# Patient Record
Sex: MALE | Race: WHITE | NOT HISPANIC OR LATINO | Employment: OTHER | URBAN - METROPOLITAN AREA
[De-identification: names, ages, dates, MRNs, and addresses within clinical notes are randomized per-mention and may not be internally consistent; named-entity substitution may affect disease eponyms.]

---

## 2017-01-20 ENCOUNTER — THERAPY VISIT (OUTPATIENT)
Dept: PHYSICAL THERAPY | Facility: CLINIC | Age: 57
End: 2017-01-20
Payer: MEDICARE

## 2017-01-20 DIAGNOSIS — G89.29 CHRONIC MIDLINE LOW BACK PAIN WITHOUT SCIATICA: Primary | ICD-10-CM

## 2017-01-20 DIAGNOSIS — M54.50 CHRONIC MIDLINE LOW BACK PAIN WITHOUT SCIATICA: Primary | ICD-10-CM

## 2017-01-20 PROCEDURE — 97110 THERAPEUTIC EXERCISES: CPT | Mod: GP | Performed by: PHYSICAL THERAPIST

## 2017-01-20 PROCEDURE — 97140 MANUAL THERAPY 1/> REGIONS: CPT | Mod: GP | Performed by: PHYSICAL THERAPIST

## 2017-01-27 ENCOUNTER — THERAPY VISIT (OUTPATIENT)
Dept: PHYSICAL THERAPY | Facility: CLINIC | Age: 57
End: 2017-01-27
Payer: MEDICARE

## 2017-01-27 DIAGNOSIS — M54.50 CHRONIC MIDLINE LOW BACK PAIN WITHOUT SCIATICA: Primary | ICD-10-CM

## 2017-01-27 DIAGNOSIS — G89.29 CHRONIC MIDLINE LOW BACK PAIN WITHOUT SCIATICA: Primary | ICD-10-CM

## 2017-01-27 PROCEDURE — 97035 APP MDLTY 1+ULTRASOUND EA 15: CPT | Mod: GP | Performed by: PHYSICAL THERAPIST

## 2017-01-27 PROCEDURE — 97110 THERAPEUTIC EXERCISES: CPT | Mod: GP | Performed by: PHYSICAL THERAPIST

## 2017-02-02 ENCOUNTER — THERAPY VISIT (OUTPATIENT)
Dept: PHYSICAL THERAPY | Facility: CLINIC | Age: 57
End: 2017-02-02
Payer: MEDICARE

## 2017-02-02 DIAGNOSIS — M54.50 CHRONIC MIDLINE LOW BACK PAIN WITHOUT SCIATICA: Primary | ICD-10-CM

## 2017-02-02 DIAGNOSIS — G89.29 CHRONIC MIDLINE LOW BACK PAIN WITHOUT SCIATICA: Primary | ICD-10-CM

## 2017-02-02 PROCEDURE — 97140 MANUAL THERAPY 1/> REGIONS: CPT | Mod: GP | Performed by: PHYSICAL THERAPIST

## 2017-02-02 PROCEDURE — 97035 APP MDLTY 1+ULTRASOUND EA 15: CPT | Mod: GP | Performed by: PHYSICAL THERAPIST

## 2017-02-10 ENCOUNTER — THERAPY VISIT (OUTPATIENT)
Dept: PHYSICAL THERAPY | Facility: CLINIC | Age: 57
End: 2017-02-10
Payer: MEDICARE

## 2017-02-10 DIAGNOSIS — G89.29 CHRONIC MIDLINE LOW BACK PAIN WITHOUT SCIATICA: Primary | ICD-10-CM

## 2017-02-10 DIAGNOSIS — M54.50 CHRONIC MIDLINE LOW BACK PAIN WITHOUT SCIATICA: Primary | ICD-10-CM

## 2017-02-10 PROCEDURE — 97035 APP MDLTY 1+ULTRASOUND EA 15: CPT | Mod: GP | Performed by: PHYSICAL THERAPIST

## 2017-02-10 PROCEDURE — 97110 THERAPEUTIC EXERCISES: CPT | Mod: GP | Performed by: PHYSICAL THERAPIST

## 2017-02-17 ENCOUNTER — THERAPY VISIT (OUTPATIENT)
Dept: PHYSICAL THERAPY | Facility: CLINIC | Age: 57
End: 2017-02-17
Payer: MEDICARE

## 2017-02-17 DIAGNOSIS — G89.29 CHRONIC MIDLINE LOW BACK PAIN WITHOUT SCIATICA: ICD-10-CM

## 2017-02-17 DIAGNOSIS — M54.50 CHRONIC MIDLINE LOW BACK PAIN WITHOUT SCIATICA: ICD-10-CM

## 2017-02-17 PROCEDURE — 97110 THERAPEUTIC EXERCISES: CPT | Mod: GP | Performed by: PHYSICAL THERAPIST

## 2017-02-17 PROCEDURE — 97035 APP MDLTY 1+ULTRASOUND EA 15: CPT | Mod: GP | Performed by: PHYSICAL THERAPIST

## 2017-03-03 ENCOUNTER — THERAPY VISIT (OUTPATIENT)
Dept: PHYSICAL THERAPY | Facility: CLINIC | Age: 57
End: 2017-03-03
Payer: MEDICARE

## 2017-03-03 DIAGNOSIS — M54.50 CHRONIC MIDLINE LOW BACK PAIN WITHOUT SCIATICA: ICD-10-CM

## 2017-03-03 DIAGNOSIS — G89.29 CHRONIC MIDLINE LOW BACK PAIN WITHOUT SCIATICA: ICD-10-CM

## 2017-03-03 PROCEDURE — 97110 THERAPEUTIC EXERCISES: CPT | Mod: GP | Performed by: PHYSICAL THERAPIST

## 2017-03-03 PROCEDURE — 97035 APP MDLTY 1+ULTRASOUND EA 15: CPT | Mod: GP | Performed by: PHYSICAL THERAPIST

## 2017-03-08 ENCOUNTER — THERAPY VISIT (OUTPATIENT)
Dept: PHYSICAL THERAPY | Facility: CLINIC | Age: 57
End: 2017-03-08
Payer: MEDICARE

## 2017-03-08 DIAGNOSIS — G89.29 CHRONIC MIDLINE LOW BACK PAIN WITHOUT SCIATICA: ICD-10-CM

## 2017-03-08 DIAGNOSIS — M54.50 CHRONIC MIDLINE LOW BACK PAIN WITHOUT SCIATICA: ICD-10-CM

## 2017-03-08 PROCEDURE — 97110 THERAPEUTIC EXERCISES: CPT | Mod: GP | Performed by: PHYSICAL THERAPIST

## 2017-03-17 ENCOUNTER — THERAPY VISIT (OUTPATIENT)
Dept: PHYSICAL THERAPY | Facility: CLINIC | Age: 57
End: 2017-03-17
Payer: MEDICARE

## 2017-03-17 DIAGNOSIS — M54.50 CHRONIC MIDLINE LOW BACK PAIN WITHOUT SCIATICA: ICD-10-CM

## 2017-03-17 DIAGNOSIS — G89.29 CHRONIC MIDLINE LOW BACK PAIN WITHOUT SCIATICA: ICD-10-CM

## 2017-03-17 PROCEDURE — 97110 THERAPEUTIC EXERCISES: CPT | Mod: GP | Performed by: PHYSICAL THERAPIST

## 2017-03-17 PROCEDURE — 97035 APP MDLTY 1+ULTRASOUND EA 15: CPT | Mod: GP | Performed by: PHYSICAL THERAPIST

## 2017-03-23 ENCOUNTER — THERAPY VISIT (OUTPATIENT)
Dept: PHYSICAL THERAPY | Facility: CLINIC | Age: 57
End: 2017-03-23
Payer: MEDICARE

## 2017-03-23 DIAGNOSIS — G89.29 CHRONIC MIDLINE LOW BACK PAIN WITHOUT SCIATICA: ICD-10-CM

## 2017-03-23 DIAGNOSIS — M54.50 CHRONIC MIDLINE LOW BACK PAIN WITHOUT SCIATICA: ICD-10-CM

## 2017-03-23 PROCEDURE — 97035 APP MDLTY 1+ULTRASOUND EA 15: CPT | Mod: GP | Performed by: PHYSICAL THERAPIST

## 2017-03-23 PROCEDURE — 97110 THERAPEUTIC EXERCISES: CPT | Mod: GP | Performed by: PHYSICAL THERAPIST

## 2017-03-31 ENCOUNTER — THERAPY VISIT (OUTPATIENT)
Dept: PHYSICAL THERAPY | Facility: CLINIC | Age: 57
End: 2017-03-31
Payer: MEDICARE

## 2017-03-31 DIAGNOSIS — G89.29 CHRONIC MIDLINE LOW BACK PAIN WITHOUT SCIATICA: ICD-10-CM

## 2017-03-31 DIAGNOSIS — M54.50 CHRONIC MIDLINE LOW BACK PAIN WITHOUT SCIATICA: ICD-10-CM

## 2017-03-31 PROCEDURE — 97035 APP MDLTY 1+ULTRASOUND EA 15: CPT | Mod: GP | Performed by: PHYSICAL THERAPIST

## 2017-03-31 PROCEDURE — 97110 THERAPEUTIC EXERCISES: CPT | Mod: GP | Performed by: PHYSICAL THERAPIST

## 2017-04-07 ENCOUNTER — THERAPY VISIT (OUTPATIENT)
Dept: PHYSICAL THERAPY | Facility: CLINIC | Age: 57
End: 2017-04-07
Payer: MEDICARE

## 2017-04-07 DIAGNOSIS — G89.29 CHRONIC MIDLINE LOW BACK PAIN WITHOUT SCIATICA: ICD-10-CM

## 2017-04-07 DIAGNOSIS — M54.50 CHRONIC MIDLINE LOW BACK PAIN WITHOUT SCIATICA: ICD-10-CM

## 2017-04-07 PROCEDURE — 97035 APP MDLTY 1+ULTRASOUND EA 15: CPT | Mod: GP | Performed by: PHYSICAL THERAPIST

## 2017-04-07 PROCEDURE — 97110 THERAPEUTIC EXERCISES: CPT | Mod: GP | Performed by: PHYSICAL THERAPIST

## 2017-04-13 DIAGNOSIS — R25.2 SPASM: ICD-10-CM

## 2017-04-13 RX ORDER — DIAZEPAM 5 MG
TABLET ORAL
Qty: 60 TABLET | Refills: 0 | Status: SHIPPED | OUTPATIENT
Start: 2017-04-13 | End: 2018-04-09

## 2017-04-13 NOTE — TELEPHONE ENCOUNTER
Valium      Last Written Prescription Date: 11/29/2016  Last Fill Quantity: 60,  # refills: 0   Last Office Visit with [facility], [facility] or [facility] prescribing provider: 11/29/2016

## 2017-04-21 ENCOUNTER — THERAPY VISIT (OUTPATIENT)
Dept: PHYSICAL THERAPY | Facility: CLINIC | Age: 57
End: 2017-04-21
Payer: MEDICARE

## 2017-04-21 DIAGNOSIS — G89.29 CHRONIC MIDLINE LOW BACK PAIN WITHOUT SCIATICA: ICD-10-CM

## 2017-04-21 DIAGNOSIS — M54.50 CHRONIC MIDLINE LOW BACK PAIN WITHOUT SCIATICA: ICD-10-CM

## 2017-04-21 PROCEDURE — 97110 THERAPEUTIC EXERCISES: CPT | Mod: GP | Performed by: PHYSICAL THERAPIST

## 2017-04-21 PROCEDURE — 97035 APP MDLTY 1+ULTRASOUND EA 15: CPT | Mod: GP | Performed by: PHYSICAL THERAPIST

## 2017-04-28 ENCOUNTER — THERAPY VISIT (OUTPATIENT)
Dept: PHYSICAL THERAPY | Facility: CLINIC | Age: 57
End: 2017-04-28
Payer: MEDICARE

## 2017-04-28 DIAGNOSIS — G89.29 CHRONIC MIDLINE LOW BACK PAIN WITHOUT SCIATICA: ICD-10-CM

## 2017-04-28 DIAGNOSIS — M54.50 CHRONIC MIDLINE LOW BACK PAIN WITHOUT SCIATICA: ICD-10-CM

## 2017-04-28 PROCEDURE — 97110 THERAPEUTIC EXERCISES: CPT | Mod: GP | Performed by: PHYSICAL THERAPIST

## 2017-06-27 NOTE — PROGRESS NOTES
Progress Note    Progress reporting period is from initial eval to Apr 28, 2017.     Patient seen for   visits.    SUBJECTIVE  Subjective changes noted by patient:  Subjective: Had another good week. No leg symptoms. Going to try ~1 month independently to see how he does. .  Current pain level is Current Pain level: 1/10.     Previous pain level was  Initial Pain level: 4/10.   Changes in function:  Yes (See Goal flowsheet attached for changes in current functional level)  Adverse reaction to treatment or activity: None    OBJECTIVE  Changes noted in objective findings: Objective: R and L LBP equivalent today, and incr tone in bilateral paraspinals - though lesser. Demonstrates good muscular endurance with UE resisted circuit today. .    ASSESSMENT/PLAN      DIAGP:  The encounter diagnosis was Chronic midline low back pain without sciatica.  Updated problem list and treatment plan:   Diagnosis 1:  Mechanical LBP w/o sciatica    Pain -  self management  Decreased joint mobility - manual therapy, therapeutic exercise and home program  Impaired muscle performance - neuro re-education  Decreased function - therapeutic activities  Impaired posture - neuro re-education    STG/LTGs have been met or progress has been made towards goals:  Yes, please see goal flowsheet for most current information.    Assessment of Progress: The patient's condition has potential to improve.  The patient's progress has plateaued.  Self Management Plans:  Patient is independent in a home treatment program.  I have re-evaluated this patient and find that the nature, scope, duration and intensity of the therapy is appropriate for the medical condition of the patient.  Ambrocio continues to require the following intervention to meet STG and LTG's:  PT.    Recommendations:  This patient would benefit from continued therapy - will trial independently working on home exercise program for 1 month.       Jay Weiner, PT, DPT      Please refer to the  daily flowsheet for treatment today, total treatment time and time spent performing 1:1 timed codes.

## 2017-10-02 ENCOUNTER — TELEPHONE (OUTPATIENT)
Dept: FAMILY MEDICINE | Facility: CLINIC | Age: 57
End: 2017-10-02

## 2017-10-02 DIAGNOSIS — J01.01 ACUTE RECURRENT MAXILLARY SINUSITIS: Primary | ICD-10-CM

## 2017-10-02 RX ORDER — AZITHROMYCIN 250 MG/1
TABLET, FILM COATED ORAL
Qty: 6 TABLET | Refills: 0 | Status: SHIPPED | OUTPATIENT
Start: 2017-10-02 | End: 2018-04-09

## 2017-10-02 NOTE — TELEPHONE ENCOUNTER
Reason for call:  Patient reporting a symptom    Symptom or request: Feels like he is getting a sinus infection:  pain behind nose and eyes especially on left side.  About a degree of temperature.     Duration (how long have symptoms been present):  About a week    Additional comments: I offered NP appointment tomorrow.  Asking if possible to get something prescribed over the phone as he is disabled and has difficulty getting transportation.     Phone Number patient can be reached at:  Home number on file [phone] (home)    Best Time:      Can we leave a detailed message on this number:  YES    Call taken on 10/2/2017 at 2:48 PM by Luz Elena Hinton

## 2017-10-02 NOTE — TELEPHONE ENCOUNTER
Spoke with patient.  He is appreciative and will call if not improving after about 72 hours on the antibiotic.  Tiffanie Dan RN

## 2017-10-02 NOTE — TELEPHONE ENCOUNTER
PCP:    Pt reports he developed some cold like symptoms last week -- nasal congestion, thick mucus.    Some throat soreness, mild. Now the Pt is having sinus pressure and pain on the Left side of his face.   The Pt is running a temp of 99 currently. Symptoms have been present for about 8 days.     The Pt is disabled and has a difficult time getting into the clinic. Advised him typically we do not prescribe antibiotics over the phone, however the Pt is requesting we run this by PCP first.   Pharmacy is pended if needed.     Callie Sebastian RN

## 2017-10-05 ENCOUNTER — TELEPHONE (OUTPATIENT)
Dept: FAMILY MEDICINE | Facility: CLINIC | Age: 57
End: 2017-10-05

## 2017-10-05 NOTE — TELEPHONE ENCOUNTER
Left message inquiring what patient is requesting sent referral orders? Results? confirm the current address in chart please.   Anju Sharpe CMA

## 2017-10-05 NOTE — TELEPHONE ENCOUNTER
Reason for Call:  Request for results:    Name of test or procedure: Echocardiogram, Bone Density test and other     Date of test of procedure: NA     Location of the test or procedure: [place]     OK to leave the result message on voice mail or with a family member? YES    Phone number Patient can be reached at:  Cell number on file:    Telephone Information:   Mobile [phone]       Additional comments: Ambrocio Mayberryy and wife are calling because the wrong address was in the system today. They are okay with everything being mailed. Would like a call when everything is mailed out.     Call taken on 10/5/2017 at 2:04 PM by Karishma Colin

## 2017-10-30 ENCOUNTER — HOSPITAL ENCOUNTER (OUTPATIENT)
Dept: OCCUPATIONAL THERAPY | Facility: CLINIC | Age: 57
Setting detail: THERAPIES SERIES
End: 2017-10-30
Attending: INTERNAL MEDICINE
Payer: MEDICARE

## 2017-10-30 PROCEDURE — 40000132 ZZH STATISTIC OT SEATING/WHEELED MOBILITY VISIT: Performed by: OCCUPATIONAL THERAPIST

## 2017-10-30 PROCEDURE — 97542 WHEELCHAIR MNGMENT TRAINING: CPT | Mod: GO | Performed by: OCCUPATIONAL THERAPIST

## 2017-10-30 NOTE — PROGRESS NOTES
[facility]                                                                                             OUTPATIENT OCCUPATIONAL THERAPY  EVALUATION  PLAN OF TREATMENT FOR OUTPATIENT REHABILITATION  (COMPLETE FOR INITIAL CLAIMS ONLY)    Patient's Last Name, First Name, M.I.                    YOB: 1960  Ambrocio Delvalle       Provider s Name: [facility] Medical Record No.  [id number]     Onset Date: 9/17/17 (order)   Start of Care Date: 10/30/17     Type: OCCUPATIONAL THERAPY   Medical Diagnosis:     Therapy Diagnosis: spastic limited coordination limit safe and independent participation in MRADLS       _______________________________________________________________________  Plan of Treatment/Functional Goals:  Planned Therapy Interventions: Wheelchair Management/Propulsion Training  Educated on types of RWD chairs and desire to change back to what he previously had.  Patient to have home trial with RWD, need to have contoured backrest     Goals  Goal Identifier: Power mobility  Goal Description: Patient to demonstrate a successful home trial with the recommended equipment  Target Date: 01/28/18     Therapy Frequency: once    Ana James, OTR/L,ATP  _______________________________________________________________________    I CERTIFY THE NEED FOR THESE SERVICES FURNISHED UNDER        THIS PLAN OF TREATMENT AND WHILE UNDER MY CARE     (Physician co-signature of this document indicates review and certification of the therapy plan).              Certification Period: 10/30/17 to 01/28/18     Referring Physician: Ezekiel Quevedo    Initial Assessment        See evaluation for start of care date 10/30/17 in Epic electronic health record.

## 2017-10-30 NOTE — PROGRESS NOTES
SEATING AND WHEELED MOBILITY ASSESSMENT  10/30/17 1400   Quick Adds   Quick Adds Certification;Current Power Wheelchair   General Information:   Rehab Discipline OT   Funding Medicare/BCBS   Service Outpatient;Occupational Therapy;Seating/Wheeled Mobility Evaluation   Height 5'6   Weight 190   Start Of Care Date 10/30/17   Referring Physician Ezekiel Quevedo   Orders Evaluate And Treat As Indicated;Per Therapist Evaluation   Orders Date 09/17/17   Others Present at Evaluation Mother/caregiver   Patient/Caregiver Goals Replacement power chair   Rehabilitation Technology Supplier Ana FLOR from [facility]   Current Community Support Family/Friend Caregiver  (mother- Ivette)   Patient role/Employment history Disabled;Other/Comments  (volunteers at [employer])   General Information Comments teaches [employer]   Medical History   Onset Of Illness/injury Or Date Of Surgery 9/17/17  (order)   Medical Diagnosis CP   Medical History Complex regional pain syndrome, pulmonary HTN, depression, heel cord lengthehing, abbductor release, pattela lowering   Current Power Wheelchair   Age 7 years old    Inv[employer]are TDX SP   Cushion Custom  (slimline- honeycomb)   Back Solid Curved   Footrest Style power elevating and swing away   Headrest Yes   Settings Used Home;Outdoor Community Mobility;Primary Means of Transportation   Condition Beyond Further Justifiable Repair   Positioning Features Tilt Seat;Recline Back;Power Elevating Leg Rests   Power Control Right Joystick   Current Equipment Requires Replacement   Rational for Equipment Changes Heavy duty user needing replacement power chair with updated seating and return to RWD that he is more comfortable with   Home Accessibility   Living Environment House   Primary Entrance Covered Ramp   All Rooms Wheelchair Accessible Yes   Community ADL   Transportation Adapted Vehicle;Transportation Services   Adapted Vehicle Features Side Entry   Community Mobility  Requirements Medical Appointments;Baptism;Shopping   Cognitive/Visual/Hearing Status   Observations No Problems Observed During Evaluation   Vision Corrective Lenses   Hearing Intact   ADL Status   Feeding Requires Assist  (assist with set up and cutting)   Grooming/Hygiene Independent   Dressing Requires Assist   Toileting Uses Equipment;Requires Assist  (shower commode slide system)   Bathing Requires Assist;Uses Equipment  (shower commode chair slide sytem)   Meal Preparation Unable   Home Management Unable   ADL Comments Shower commode slide system is 20 years old and no longer made thus cannot be fixed.  Rigor system is removed when mother showers.  Daily showers and toilet use   Fatigue   Fatigue Measure Score one nap per day   Balance   Unsupported Sitting Balance Uses Upper Extremities for Balance   Sitting Balance in Chair Uses Upper Extremities for Balance   Standing Balance Unable   Ambulation   Ambulation Non Ambulatory   Transfers   Transfer Assist Dependent   Transfer Method Mechanical Lift   Transfer Comments Sure hands lift- beyond justifiable repair.  11 years old    Sleep/Rest   Sleep Surface/Equipment [facility] bed   Wheelchair Ability   Wheelchair Ability Quick Adds Power Chair;Wheelchair Use   Power Wheelchair Propulsion   Operate Power Wheelchair Standard Joystick;Independent   Wheelchair Use   Ability to Perform Weight Shifts Assist   Bed Confined Without Wheelchair? Yes   Hours in Wheelchair Daily 12   Hours Spent Alone Daily 0   Fall Risk Screen   Fall screen completed by OT   Have you fallen 2 or more times in the last year? No   Have you fallen and had an injury in the past year? No   Is the patient a fall risk? No   Neuromuscular   History of Pressure Sores No   Current Pressure Sores No   Pain Yes   Pain Location chronic back pain- lumbar   Coordination UE Impaired;LE Impaired   Tone Spasticity  (baclofen-oral)   Sensory Deficits Reported feet occationally   Head and Neck    Head and Neck Position Functional   Head Control Good   Upper Extremities   UE ROM Full range with limited internal rotation, L elbow contracture   UE Strength 4+/5   Dominance Right   Pelvis   Anterior/Posterior Pelvis Position Anterior Tilt;Partially Flexible   Pelvic Obliquity Left Elevation   Trunk   Anterior/Posterior Trunk Position Increased Lumbar Lordosis;Partially Flexible   Left-Right Trunk Position S Curve   Lower Extremities   Hip Position Adducted   LE ROM limited active range, passive range limited at all joints- tight heel cords and hamstrings ( prior surgeries to lengthen)   LE Strength 0-1/5   Foot Positioning Plantar flexed   LE Comments custom shoes- right smaller than left   Patient Measurements   Other see atp notes   Education Assessment   Barriers to Learning No Barriers   Preferred Learning Style Listening;Demonstration   Assessment/Plan   Criteria for Skilled Interventions Met Yes, Treatment Indicated   Treatment Diagnosis spastic limited coordination limit safe and independent participation in MRADLS    Therapy Frequency once   Planned Therapy Interventions Wheelchair Management/Propulsion Training   Planned Therapy Interventions Comments Educated on types of RWD chairs and desire to change back to what he previously had.  Patient to have home trial with RWD, need to have contoured backrest   Risks and benefits of treatment have been explained Yes   Patient/family & other staff in agreement with plan of care Yes   Comments home trials   Session Time   Total Treatment Time 90   Total Session Time 90   Certification   Certification date from 10/30/17   Certification date to 01/28/18   Adult OT Eval Goals   OT Eval Goals (Adult) 1   OT Goal 1   Goal Identifier Power mobility   Goal Description Patient to demonstrate a successful home trial with the recommended equipment   Target Date 01/28/18   Electronically signed by:  Ana James OTR/L, ATP       Occupational Therapist, Assistive    [phone]      fax: [phone]      [email or web address], [address]  [place]   [address]

## 2017-11-29 DIAGNOSIS — F33.41 RECURRENT MAJOR DEPRESSIVE DISORDER, IN PARTIAL REMISSION (H): ICD-10-CM

## 2017-11-29 NOTE — LETTER
[facility]  [address] [facility] [address]  Phone: [phone]        December 6, 2017      Ambrocio Delvalle                                     [address] [facility] [address]            Dear Tony Delvalle,    We are concerned about your health care.  We recently provided you with a medication refill.  Many medications require routine follow-up with your Doctor.      At this time we ask that: You schedule an appointment for your annual physical.    Your prescription: Has been refilled for 1 month so you may have time for the above noted follow-up.      Thank you,      Ezekiel Quevedo MD/ mike

## 2017-12-05 ENCOUNTER — ALLIED HEALTH/NURSE VISIT (OUTPATIENT)
Dept: NURSING | Facility: CLINIC | Age: 57
End: 2017-12-05
Payer: COMMERCIAL

## 2017-12-05 DIAGNOSIS — Z23 NEED FOR PROPHYLACTIC VACCINATION AND INOCULATION AGAINST INFLUENZA: Primary | ICD-10-CM

## 2017-12-05 PROCEDURE — 90662 IIV NO PRSV INCREASED AG IM: CPT

## 2017-12-05 PROCEDURE — G0008 ADMIN INFLUENZA VIRUS VAC: HCPCS

## 2017-12-05 NOTE — PROGRESS NOTES

## 2017-12-05 NOTE — MR AVS SNAPSHOT
"              After Visit Summary   2017    Ambrocio Delvalle    MRN: [id number]           Patient Information     Date Of Birth          1960        Visit Information        Provider Department      2017 11:30 AM [facility] [place] NURSE [facility]        Today's Diagnoses     Need for prophylactic vaccination and inoculation against influenza    -  1       Follow-ups after your visit        Who to contact     If you have questions or need follow up information about today's clinic visit or your schedule please contact [facility] directly at [phone].  Normal or non-critical lab and imaging results will be communicated to you by Vdancerhart, letter or phone within 4 business days after the clinic has received the results. If you do not hear from us within 7 days, please contact the clinic through Jukelyt or phone. If you have a critical or abnormal lab result, we will notify you by phone as soon as possible.  Submit refill requests through Reasult or call your pharmacy and they will forward the refill request to us. Please allow 3 business days for your refill to be completed.          Additional Information About Your Visit        MyChart Information     Reasult lets you send messages to your doctor, view your test results, renew your prescriptions, schedule appointments and more. To sign up, go to [email or web address] . Click on \"Log in\" on the left side of the screen, which will take you to the Welcome page. Then click on \"Sign up Now\" on the right side of the page.     You will be asked to enter the access code listed below, as well as some personal information. Please follow the directions to create your username and password.     Your access code is: [email or web address]  Expires: 3/5/2018 12:13 PM     Your access code will  in 90 days. If you need help or a new code, please call your [facility] or [phone].        Care EveryWhere ID     This is your Care EveryWhere ID. " This could be used by other organizations to access your [place] medical records  [id number]         Blood Pressure from Last 3 Encounters:   11/29/16 139/85   09/18/15 134/86   08/21/14 (!) 148/93    Weight from Last 3 Encounters:   11/29/16 186 lb (84.4 kg)   09/18/15 186 lb (84.4 kg)   08/21/14 175 lb (79.4 kg)              We Performed the Following     FLU VACCINE, INCREASED ANTIGEN, PRESV FREE     Vaccine Administration, Initial [[id number]]        Primary Care Provider Office Phone # Fax #    Ezekiel Singh Quevedo [phone] [address] [address]        Equal Access to Services     [facility] CAROLYNN : Hadii aad ku hadasho Soomaali, waaxda luqadaha, qaybta kaalmada adeegyada, waxay dionne dozier . So [facility] [phone].    ATENCIÓN: Si habla español, tiene a narvaez disposición servicios gratuitos de asistencia lingüística. Llame al [phone].    We comply with applicable federal civil rights laws and [place] laws. We do not discriminate on the basis of race, color, national origin, age, disability, sex, sexual orientation, or gender identity.            Thank you!     Thank you for choosing [facility]  for your care. Our goal is always to provide you with excellent care. Hearing back from our patients is one way we can continue to improve our services. Please take a few minutes to complete the written survey that you may receive in the mail after your visit with us. Thank you!             Your Updated Medication List - Protect others around you: Learn how to safely use, store and throw away your medicines at www.disposemymeds.org.          This list is accurate as of: 12/5/17 12:13 PM.  Always use your most recent med list.                   Brand Name Dispense Instructions for use Diagnosis    azithromycin 250 MG tablet    ZITHROMAX    6 tablet    Two tablets first day, then one tablet daily for four days.    Acute recurrent maxillary  sinusitis       baclofen 20 MG tablet    LIORESAL    450 tablet    Take 1 tablet (20 mg) by mouth 5 times daily    Spasm       diazepam 5 MG tablet    VALIUM    60 tablet    TAKE ONE TABLET (5MG) BY MOUTH ONCE NIGHTLY AS NEEDED    Spasm       FLONASE 50 MCG/ACT spray   Generic drug:  fluticasone     16 g    Spray 1-2 sprays into both nostrils daily    Nasal congestion       ibuprofen 800 MG tablet    ADVIL/MOTRIN    90 tablet    Take 1 tablet by mouth every 8 hours as needed.    HTN (hypertension)       LIPITOR 20 MG tablet   Generic drug:  atorvastatin     90 tablet    Take 1 tablet (20 mg) by mouth daily    Hyperlipidemia LDL goal <130       * order for DME     1 Units    Equipment being ordered: Hospital Bed    Cerebral palsy (H), Reflex sympathetic dystrophy of lower extremity, bilateral       * order for DME     1 Units    Equipment being ordered: Wheelchair    Cerebral palsy (H), Reflex sympathetic dystrophy of lower extremity, bilateral       potassium chloride 8 MEQ CR tablet    KLOR-CON    180 tablet    Take 2 tablets (16 mEq) by mouth daily    Muscle spasm, Benign essential hypertension       sertraline 100 MG tablet    ZOLOFT    180 tablet    Take 2 tablets (200 mg) by mouth daily    Recurrent major depressive disorder, in partial remission (H)       triamterene-hydrochlorothiazide 37.5-25 MG per capsule    DYAZIDE    90 capsule    Take 1 capsule by mouth every morning    Benign essential hypertension       TYLENOL SINUS NIGHT TIME PO      Take  by mouth.        VITAMIN D PO      Take  by mouth.        * Notice:  This list has 2 medication(s) that are the same as other medications prescribed for you. Read the directions carefully, and ask your doctor or other care provider to review them with you.

## 2017-12-06 RX ORDER — SERTRALINE HYDROCHLORIDE 100 MG/1
TABLET, FILM COATED ORAL
Qty: 180 TABLET | Refills: 0 | Status: SHIPPED | OUTPATIENT
Start: 2017-12-06 | End: 2018-03-28

## 2017-12-06 NOTE — TELEPHONE ENCOUNTER
Medication is being filled for 1 time refill only due to:  Patient needs to be seen because it has been more than one year since last visit.   Letter mailed to patient today.  Dionne Deshpande RN

## 2017-12-18 DIAGNOSIS — E78.5 HYPERLIPIDEMIA LDL GOAL <130: ICD-10-CM

## 2017-12-20 RX ORDER — ATORVASTATIN CALCIUM 20 MG
TABLET ORAL
Qty: 90 TABLET | Refills: 0 | Status: SHIPPED | OUTPATIENT
Start: 2017-12-20 | End: 2018-03-22

## 2017-12-20 NOTE — TELEPHONE ENCOUNTER
Routing refill request to provider for review/approval because:  Laly given x1 and patient did not follow up, please advise    Letter was mailed to pt in November, pt due for px.  Last seen  11/29/16  Blanquita Rose RN

## 2018-01-31 NOTE — ADDENDUM NOTE
Encounter addended by: Ana Nichole OT on: 1/31/2018  3:30 PM<BR>     Actions taken: Sign clinical note

## 2018-01-31 NOTE — PROGRESS NOTES
REQUISITION AND JUSTIFICATION FOR DURABLE MEDICAL EQUIPMENT    Patient Name:  Ambrocio Delvalle  MR #:  [id number]  :  1960  Age/Gender:  57 year old male  Visit Date:  Ambrocio Delvalle seen for seating and wheeled mobility evaluation by Ana Nichole OTR/L,ATP and ATP from [facility] on 10/30/17.    CLINICAL CRITERIA FOR MOBILITY ASSISTIVE EQUIPMENT  Coverage Criteria Per Local Coverage Determination  A) Ambrocio has mobility limitations due to Cerebral Palsy, complex regional pain syndrome, pulmonary HTN, depression, heel cord lengthening, abductor release, and patella lowering that significantly impairs his ability to participate in all of his mobility-related activities of daily living (MRADL). Specifically affected are toileting (being able to get there in time to prevent accidents), dressing, and bathing (getting into the bathroom of designated place). Current equipment used is 7 year old TDX SP. This patient needs the new equipment requested to be able to continue to be able to be independent with all MRADLs.  He is a heavy duty user that requires updating of current power seat functions and return to rear wheel drive mobility in which he had prior to this chair which is easier to handle in his environment. Please see additional documentation in the seating and wheeled mobility report for details.   Ambrocio had a successful clinical trial here, and also a successful trial at home with the recommended equipment. Ambrocio is very willing and physically / cognitively able to use the recommended equipment to assist his with mobility-related activities of daily living and general mobility. A group 3 power wheelchair is being requested because it has better suspension for a smooth ride and has the capabilities of expandable electronics to operate the  power seat functions Ambrocio needs for independence with his activities of daily living. A Group 2 power wheelchair does not have sufficient electronics to  support this patient's progressive neurological deficits due to CP.  B) Ambrocio's mobility limitation cannot be sufficiently and safely resolved by the use of an appropriately fitted cane or walker because he is non ambulatory due to spastic CP. Strength of legs is 0-1/5 for one maximal repetition. Fatigue also impacts this patient's ability to ambulate, regardless of the gait aid.    C) Ambrocio does not have sufficient upper extremity function to self-propel an optimally-configured manual wheelchair in his home to perform MRADLs during a typical day due to limitations in strength, endurance, range of motion, and coordination. Distance and time to propel a light weight manual wheelchair NT as he is a full time power w/c user.  Strength of arms is 4+/5.  D)  Ambrocio is not able to use a POV/scooter because it will not fit in his home environment. Ambrocio is unable to safely transfer to and from a POV, unable to operate the tiller steering system, and unable to maintain postural stability and position while operating the POV. Ambrocio needs more appropriate seating and positioning than any scooter seat provides.  E) The need for this equipment is LIFETIME.     RECOMMENDATIONS FOR MOBILITY BASE, SEATING SYSTEM AND COMPONENTS  [employer] Torque 3- this rear wheel drive power wheelchair is needed for this patient to continue to have independent mobility and to be able to allow him to complete or assist in all of his mobility related activities of daily living (MRADLs). This wheelchair will also have the seating and positioning system and seat function he needs to be able to use and tolerate the wheelchair full time, and have functional and comfortable positioning for a full day's activities. Ambrocio has Cerebral Palsy, complex regional pain syndrome, pulmonary HTN, depression, heel cord lengthening, abductor release, and patella lowering which impairs him ability to move in his home without the use of the requested  wheelchair. He lives in an accessible home with ramp access and uses an adapted van or transport services for transportation.    Transit option- Ambrocio must frequently travel to medical and therapy appointments in the community and travel using paratransit/public transit/wheelchair adapted van.  Due to weakness, in-coordination, and safety concerns he is unable to independently initiate and safely complete a transfer from his/her wheelchair to the crash tested automobile, paratransit or public transportation vehicle seat.  The concept of a wheelchair transportation safety system allows for a securement system for anchoring the wheelchair to the floor of the motor vehicle, has an occupant restraint to hold the individual in their wheelchair and prevent secondary injury from hitting obstacles within the vehicle or being ejected from the vehicle and includes identified securement points to correctly secure the wheelchair to the floor, an occupant pelvic safety belt, and the strength and design features.    2 Batteries and  - gel sealed, and two are necessary to power the wheelchair. They are maintenance free and are safe for travel on the road or in the air. They are necessary to provide reliable use of the power wheelchair on a single charge.    90 amp controller -  Needed for operation of the joystick for mobility and seat functions    Harness for expandable controller - needs to be inline for communication between the controller and the joystick    MPJ + multiple drive Joystick - this is the joystick needed to be used by this patient for moving this wheelchair and also controlling the movement of the seat functions.    Attendant control- joystick style controller to allow Ambrocio's caregivers to steer/drive chair from behind. It is necessary for use when the he becomes fatigued, following medical episodes, and in potentially unsafe conditions like an busy, crowded areas, traffic intersections, steep ramps,  or in emergency situations. It can also be used to maneuver the chair into an appropriate position for transfers or vehicle transport.    Quad link swing away joystick mount - needed to be able to move the joystick out of the way during transfers, or when at the desk using the computer, or at the table eating, so as not to inadvertently hit the joystick, thus moving the wheelchair or turning the power on or off without his knowledge.    16.75 seat to floor height- needed to properly fit Ambrocio    UIltra low Noah CG tilt an power recline- This seating function combination is needed for this patient to be able to perform independent weight shifts and also to be able to change him seated position without the request of a care giver. He requires a powered seating system with both tilt and recline to optimize pressure distribution and postural repositioning.   The power tilt seat allows Ambrocio to change his position by rotating rearward without changing the angle of his hips or knees. Due to atrophy of his buttocks he is at high risk of developing pressure ulcers if not able to change his position. Due to compromised ability to exert himself for weight shifting, the power tilt seat allows him to do this by operating it through the joystick. He can also use a slight degree of tilt for assisting in his seating and positioning for normal functions during the day a to assist keeping him in a midline, erect and upright position by using the effect of gravity.   The power reclining back feature also reduces pressures by allowing Ambrocio to change the angle of his hips and knees into a more open and supine / recumbent position. This increases his tolerance and be able to remain in the requested wheelchair for a complete day and not be dependent on care givers to change his position or transfer him to another surface for comfort or resting. The recline feature can be used to access the perineal area necessary for toileting  assistance. The power tilt seat and power recline back are necessary features that allow tasks to be done by the care giver without the need for transferring back to bed for these activities.  The medical justification for these seat functions is consistent with the [employer] Position Papers regarding these seat function interventions (Cheikh et al., 2009). These seat functions will also reduce upper extremity strain per the Paraly[employer] Guidelines for upper limb preservation (Katlyn, et al., 2005).    Matrx Aguilar standard pad- needed to keep Ambrocio's head in an erect, midline and upright position whether he is in the upright, tilted or reclined position. His head and neck need to be supported as well as the rest of his body.    Aguilar headrest multi axis mounting hardware - needed for mounting the requested headrest in the most appropriate position on the back of the wheelchair for optimal head and neck support and to be able to be removed for transfers.     Matrx elite deep Solid curved and padded back support - firm and contoured back support is needed to support Ambrocio's thoracolumbar area in an upright and midline position, with appropriate support pads as needed. This will provide support whether he is in the upright or tilted position. This back support is essential to provide sufficient lateral contour to maximize his postural alignment and minimize his tendency to develop scoliosis and other secondary complications.    Modular arm pads- allows for proper arm support with chair use.    Noah style swing away power elevating foot legrest- Allows for elevation and extension of lower extremities while elevating. This can improve circulation and prevent/reduce edema (with recline/tilt combination).  The lower legs of this wheelchair user act as a reservoir for fluid accumulation due to lack of movement. Elevation of this patient's legs above the level of the heart (left atrium) is recommended as  part of the management of edema in conjunction with other measures such as support garments  This patient has lower extremity dependent edema while sitting in his wheelchair, which resolves with elevation of his legs. This feature, when used with the power recline back or tilt seat, can increase Ambrocio's sitting tolerance while positioning him in a more natural position. This can also be helpful if he fatigues and requires rests throughout the day, without transferring him back to bed.  Due to inability to perform a functional weight shifting, he is at risk for developing pressure ulcers. With the use of this feature it will allow for optimal weight shifting in conjunction with tilt and recline.Per [employer] white paper on elevating legrests, using elevating legrests and tilting more than 30 degrees in combination with full recline significantly improves lower leg hemodynamics status as measured by near-infrared spectroscopy (Cruz et al., 2010)  Additionally, these legrests can improve ground clearance to navigate thresholds and slopes and still allowing the legs to achieve a tight 90 degree position for typical driving conditions. This position shortens the overall functional wheelbase for improved maneuverability    Calf supports- angle and height adjustable pads that attach to the legrests. These padded calf supports are necessary to support Ambrocio lower legs when the leg is elevated, or to keep feet from falling behind the footplates when in the neutral seated position. Calf supports are especially important when using a tilt-in-space power seating system and/or power articulating elevating legrests. The padding provides an additional surface to distribute pressure, and has a mild contour to accommodate the lower leg.    Angle adjustable foot plates- allows for proper leg support while dealing with spastic LE in order to properly support LE.    Power Positioning electronics to be operated through the through  drive controller - this is needed to allow him to use the joystick of the wheelchair for control of mobility and operation of the power seat function. This is important for this patient with limited strength and coordination so as not to require a separate switch for operation of the seat function.    2 Batteries and  - gel sealed, and two are necessary to power the wheelchair. They are maintenance free and are safe for travel on the road or in the air. They are necessary to provide reliable use of the power wheelchair on a single charge.    Supra core contoured seat cushion - this pressure distribution and positioning seat cushion will optimally  distribute seating pressures to prevent pressure ulcers, but also provide a stable base of support for him to use during MRADLs.    This equipment is reasonable and necessary with reference to accepted standards of medical practice and treatment of this patient's condition and is not being recommended as a convenience item. Without this recommended equipment, he is highly likely to sustain injuries from falls, develop pressure sores or postural compensation, and/or be bed confined, which those costs far exceed the cost of the requested equipment.    Electronically signed by:  Ana HICKMAN, ATP      Occupational Therapist, Assistive   [phone]      fax: [phone]      [email or web address]  Seating [facility]- [facility], [place] [address]  January 31, 2018    I have read and concur with the above recommendations.    Physician Printed Name __________________________________________    Physician SIgnature  _____________________________________________    Date of SIgnature ______________________________    Physician Phone  ______________________________

## 2018-02-12 NOTE — PROGRESS NOTES
REQUISITION AND JUSTIFICATION FOR DURABLE MEDICAL EQUIPMENT    Patient Name:  Ambrocio Delvalle  MR #:  [id number]  :  1960  Age/Gender:  57 year old male  Visit Date:  Ambrocio Delvalle seen for seating and wheeled mobility evaluation by Ana Nichole OTR/L,ATP and ATP from [facility] on 10/30/17.    CLINICAL CRITERIA FOR MOBILITY ASSISTIVE EQUIPMENT  Coverage Criteria Per Local Coverage Determination  A) Ambrocio has mobility limitations due to Cerebral Palsy, complex regional pain syndrome, pulmonary HTN, depression, heel cord lengthening, abductor release, and patella lowering that significantly impairs his ability to participate in all of his mobility-related activities of daily living (MRADL). Specifically affected are toileting (being able to get there in time to prevent accidents), dressing, and bathing (getting into the bathroom of designated place). Current equipment used is 7 year old TDX SP. This patient needs the new equipment requested to be able to continue to be able to be independent with all MRADLs.  He is a heavy duty user that requires updating of current power seat functions and return to rear wheel drive mobility in which he had prior to this chair which is easier to handle in his environment. Please see additional documentation in the seating and wheeled mobility report for details.   Ambrocio had a successful clinical trial here, and also a successful trial at home with the recommended equipment. Ambrocio is very willing and physically / cognitively able to use the recommended equipment to assist his with mobility-related activities of daily living and general mobility. A group 3 power wheelchair is being requested because it has better suspension for a smooth ride and has the capabilities of expandable electronics to operate the  power seat functions Ambrocio needs for independence with his activities of daily living. A Group 2 power wheelchair does not have sufficient electronics to  support this patient's progressive neurological deficits due to CP.  B) Ambrocio's mobility limitation cannot be sufficiently and safely resolved by the use of an appropriately fitted cane or walker because he is non ambulatory due to spastic CP. Strength of legs is 0-1/5 for one maximal repetition. Fatigue also impacts this patient's ability to ambulate, regardless of the gait aid.    C) Ambrocio does not have sufficient upper extremity function to self-propel an optimally-configured manual wheelchair in his home to perform MRADLs during a typical day due to limitations in strength, endurance, range of motion, and coordination. Distance and time to propel a light weight manual wheelchair NT as he is a full time power w/c user.  Strength of arms is 4+/5.  D)  Ambrocio is not able to use a POV/scooter because it will not fit in his home environment. Ambrocio is unable to safely transfer to and from a POV, unable to operate the tiller steering system, and unable to maintain postural stability and position while operating the POV. Ambrocio needs more appropriate seating and positioning than any scooter seat provides.  E) The need for this equipment is LIFETIME.     RECOMMENDATIONS FOR MOBILITY BASE, SEATING SYSTEM AND COMPONENTS  [employer] Torque 3- this rear wheel drive power wheelchair is needed for this patient to continue to have independent mobility and to be able to allow him to complete or assist in all of his mobility related activities of daily living (MRADLs). This wheelchair will also have the seating and positioning system and seat function he needs to be able to use and tolerate the wheelchair full time, and have functional and comfortable positioning for a full day's activities. Ambrocio has Cerebral Palsy, complex regional pain syndrome, pulmonary HTN, depression, heel cord lengthening, abductor release, and patella lowering which impairs him ability to move in his home without the use of the requested  wheelchair. He lives in an accessible home with ramp access and uses an adapted van or transport services for transportation.    Transit option- Ambrocio must frequently travel to medical and therapy appointments in the community and travel using paratransit/public transit/wheelchair adapted van.  Due to weakness, in-coordination, and safety concerns he is unable to independently initiate and safely complete a transfer from his/her wheelchair to the crash tested automobile, paratransit or public transportation vehicle seat.  The concept of a wheelchair transportation safety system allows for a securement system for anchoring the wheelchair to the floor of the motor vehicle, has an occupant restraint to hold the individual in their wheelchair and prevent secondary injury from hitting obstacles within the vehicle or being ejected from the vehicle and includes identified securement points to correctly secure the wheelchair to the floor, an occupant pelvic safety belt, and the strength and design features.    2 Batteries and  - gel sealed, and two are necessary to power the wheelchair. They are maintenance free and are safe for travel on the road or in the air. They are necessary to provide reliable use of the power wheelchair on a single charge.    90 amp controller -  Needed for operation of the joystick for mobility and seat functions    Harness for expandable controller - needs to be inline for communication between the controller and the joystick    MPJ + multiple drive Joystick - this is the joystick needed to be used by this patient for moving this wheelchair and also controlling the movement of the seat functions.    Attendant control- joystick style controller to allow Ambrocio's caregivers to steer/drive chair from behind. It is necessary for use when the he becomes fatigued, following medical episodes, and in potentially unsafe conditions like an busy, crowded areas, traffic intersections, steep ramps,  or in emergency situations. It can also be used to maneuver the chair into an appropriate position for transfers or vehicle transport.    Quad link swing away joystick mount - needed to be able to move the joystick out of the way during transfers, or when at the desk using the computer, or at the table eating, so as not to inadvertently hit the joystick, thus moving the wheelchair or turning the power on or off without his knowledge.    16.75 seat to floor height- needed to properly fit Ambrocio    UIltra low Noah CG tilt an power recline- This seating function combination is needed for this patient to be able to perform independent weight shifts and also to be able to change him seated position without the request of a care giver. He requires a powered seating system with both tilt and recline to optimize pressure distribution and postural repositioning.   The power tilt seat allows Ambrocio to change his position by rotating rearward without changing the angle of his hips or knees. Due to atrophy of his buttocks he is at high risk of developing pressure ulcers if not able to change his position. Due to compromised ability to exert himself for weight shifting, the power tilt seat allows him to do this by operating it through the joystick. He can also use a slight degree of tilt for assisting in his seating and positioning for normal functions during the day a to assist keeping him in a midline, erect and upright position by using the effect of gravity.   The power reclining back feature also reduces pressures by allowing Ambrocio to change the angle of his hips and knees into a more open and supine / recumbent position. This increases his tolerance and be able to remain in the requested wheelchair for a complete day and not be dependent on care givers to change his position or transfer him to another surface for comfort or resting. The recline feature can be used to access the perineal area necessary for toileting  assistance. The power tilt seat and power recline back are necessary features that allow tasks to be done by the care giver without the need for transferring back to bed for these activities.  The medical justification for these seat functions is consistent with the [employer] Position Papers regarding these seat function interventions (Cheikh et al., 2009). These seat functions will also reduce upper extremity strain per the Paralyzed [employer] of [employer] Guidelines for upper limb preservation (Katlyn, et al., 2005).    Matrx Aguilar standard pad- needed to keep Ambrocio's head in an erect, midline and upright position whether he is in the upright, tilted or reclined position. His head and neck need to be supported as well as the rest of his body.    Aguilar headrest multi axis mounting hardware - needed for mounting the requested headrest in the most appropriate position on the back of the wheelchair for optimal head and neck support and to be able to be removed for transfers.     Matrx elite deep Solid curved and padded back support - firm and contoured back support is needed to support Ambrocio's thoracolumbar area in an upright and midline position, with appropriate support pads as needed. This will provide support whether he is in the upright or tilted position. This back support is essential to provide sufficient lateral contour to maximize his postural alignment and minimize his tendency to develop scoliosis and other secondary complications.    Modular arm pads- allows for proper arm support with chair use.    Noah style swing away power elevating foot legrest- Allows for elevation and extension of lower extremities while elevating. This can improve circulation and prevent/reduce edema (with recline/tilt combination).  The lower legs of this wheelchair user act as a reservoir for fluid accumulation due to lack of movement. Elevation of this patient's legs above the level of the heart (left atrium) is recommended as  part of the management of edema in conjunction with other measures such as support garments  This patient has lower extremity dependent edema while sitting in his wheelchair, which resolves with elevation of his legs. This feature, when used with the power recline back or tilt seat, can increase Ambrocio's sitting tolerance while positioning him in a more natural position. This can also be helpful if he fatigues and requires rests throughout the day, without transferring him back to bed.  Due to inability to perform a functional weight shifting, he is at risk for developing pressure ulcers. With the use of this feature it will allow for optimal weight shifting in conjunction with tilt and recline.Per [employer]NA white paper on elevating legrests, using elevating legrests and tilting more than 30 degrees in combination with full recline significantly improves lower leg hemodynamics status as measured by near-infrared spectroscopy (Cruz et al., 2010)  Additionally, these legrests can improve ground clearance to navigate thresholds and slopes and still allowing the legs to achieve a tight 90 degree position for typical driving conditions. This position shortens the overall functional wheelbase for improved maneuverability    Calf supports- angle and height adjustable pads that attach to the legrests. These padded calf supports are necessary to support Ambrocio lower legs when the leg is elevated, or to keep feet from falling behind the footplates when in the neutral seated position. Calf supports are especially important when using a tilt-in-space power seating system and/or power articulating elevating legrests. The padding provides an additional surface to distribute pressure, and has a mild contour to accommodate the lower leg.    Angle adjustable foot plates- allows for proper leg support while dealing with spastic LE in order to properly support LE.    Power Positioning electronics to be operated through the through  drive controller - this is needed to allow him to use the joystick of the wheelchair for control of mobility and operation of the power seat function. This is important for this patient with limited strength and coordination so as not to require a separate switch for operation of the seat function.    2 Batteries and  - gel sealed, and two are necessary to power the wheelchair. They are maintenance free and are safe for travel on the road or in the air. They are necessary to provide reliable use of the power wheelchair on a single charge.    Supra core contoured seat cushion - this pressure distribution and positioning seat cushion will optimally  distribute seating pressures to prevent pressure ulcers, but also provide a stable base of support for him to use during MRADLs.    Battery tray modification- needed in order to properly fit patient and allow for needed seat to floor height.    This equipment is reasonable and necessary with reference to accepted standards of medical practice and treatment of this patient's condition and is not being recommended as a convenience item. Without this recommended equipment, he is highly likely to sustain injuries from falls, develop pressure sores or postural compensation, and/or be bed confined, which those costs far exceed the cost of the requested equipment.    Electronically signed by:  Ana HICKMAN, ATP      Occupational Therapist, Assistive   [phone]      fax: [phone]      [email or web address]  [facility]- [facility], [address]  [place] [address]  February 12, 2018    I have read and concur with the above recommendations.    Physician Printed Name __________________________________________    Physician SIgnature  _____________________________________________    Date of SIgnature ______________________________    Physician Phone   ______________________________

## 2018-02-12 NOTE — ADDENDUM NOTE
Encounter addended by: Ana Nichole OT on: 2/12/2018  9:46 AM<BR>     Actions taken: Sign clinical note

## 2018-02-19 DIAGNOSIS — I10 BENIGN ESSENTIAL HYPERTENSION: ICD-10-CM

## 2018-02-19 NOTE — TELEPHONE ENCOUNTER
"Left VM NEEDS OV WITH PCP      Last Written Prescription Date:  11/29/2016  Last Fill Quantity: 90,  # refills: 3   Last office visit: 11/29/2016 with prescribing provider:     Future Office Visit:      Requested Prescriptions   Pending Prescriptions Disp Refills     triamterene-hydrochlorothiazide (DYAZIDE) 37.5-25 MG per capsule [Pharmacy Med Name: Triamterene-HCTZ Oral Capsule 37.5-25 MG] 90 capsule 2     Sig: TAKE 1 CAPSULE BY MOUTH EVERY MORNING    Diuretics (Including Combos) Protocol Failed    2/19/2018 11:10 AM       Failed - Recent or future visit with authorizing provider's specialty    Patient had office visit in the last year or has a visit in the next 30 days with authorizing provider.  See \"Patient Info\" tab in inbasket, or \"Choose Columns\" in Meds & Orders section of the refill encounter.            Failed - Normal serum creatinine on file in past 12 months    Recent Labs   Lab Test  11/23/16   1153   CR  0.82             Failed - Normal serum potassium on file in past 12 months    Recent Labs   Lab Test  11/23/16   1153   POTASSIUM  3.8                   Failed - Normal serum sodium on file in past 12 months    Recent Labs   Lab Test  11/23/16   1153   NA  139             Passed - Blood pressure under 140/90 in past 12 months    BP Readings from Last 3 Encounters:   11/29/16 139/85   09/18/15 134/86   08/21/14 (!) 148/93                Passed - Patient is age 18 or older          "

## 2018-02-20 RX ORDER — TRIAMTERENE AND HYDROCHLOROTHIAZIDE 37.5; 25 MG/1; MG/1
CAPSULE ORAL
Qty: 30 CAPSULE | Refills: 0 | Status: SHIPPED | OUTPATIENT
Start: 2018-02-20 | End: 2018-03-28

## 2018-02-20 NOTE — TELEPHONE ENCOUNTER
Medication is being filled for 1 time refill only due to:  Patient needs to be seen because it has been more than one year since last visit.   Last seen 11/29/16.  Billie Liriano RN

## 2018-03-05 ENCOUNTER — TELEPHONE (OUTPATIENT)
Dept: FAMILY MEDICINE | Facility: CLINIC | Age: 58
End: 2018-03-05

## 2018-03-28 DIAGNOSIS — I10 BENIGN ESSENTIAL HYPERTENSION: ICD-10-CM

## 2018-03-28 DIAGNOSIS — F33.41 RECURRENT MAJOR DEPRESSIVE DISORDER, IN PARTIAL REMISSION (H): ICD-10-CM

## 2018-03-28 NOTE — TELEPHONE ENCOUNTER
"Requested Prescriptions   Pending Prescriptions Disp Refills     triamterene-hydrochlorothiazide (DYAZIDE) 37.5-25 MG per capsule [Pharmacy Med Name: Triamterene-HCTZ Oral Capsule 37.5-25 MG] 30 capsule 0     Sig: TAKE 1 CAPSULE BY MOUTH EVERY MORNING (refill 1 month only. Due for physical & labs)    Diuretics (Including Combos) Protocol Failed    3/28/2018  3:49 PM       Failed - Blood pressure under 140/90 in past 12 months    BP Readings from Last 3 Encounters:   11/29/16 139/85   09/18/15 134/86   08/21/14 (!) 148/93                Failed - Normal serum creatinine on file in past 12 months    Recent Labs   Lab Test  11/23/16   1153   CR  0.82             Failed - Normal serum potassium on file in past 12 months    Recent Labs   Lab Test  11/23/16   1153   POTASSIUM  3.8                   Failed - Normal serum sodium on file in past 12 months    Recent Labs   Lab Test  11/23/16   1153   NA  139             Passed - Recent (12 mo) or future (30 days) visit within the authorizing provider's specialty    Patient had office visit in the last 12 months or has a visit in the next 30 days with authorizing provider or within the authorizing provider's specialty.  See \"Patient Info\" tab in inbasket, or \"Choose Columns\" in Meds & Orders section of the refill encounter.           Passed - Patient is age 18 or older     Last Written Prescription Date:  2/02/18  Last Fill Quantity: 30 capsule,  # refills: 0   Last office visit: 11/29/2016 with prescribing provider:  Emy        sertraline (ZOLOFT) 100 MG tablet [Pharmacy Med Name: Sertraline HCl Oral Tablet 100 MG] 180 tablet 0     Sig: TAKE TWO TABLETS BY MOUTH DAILY -- due for office visit before further refills    SSRIs Protocol Failed    3/28/2018  3:49 PM       Failed - PHQ-9 score less than 5 in past 6 months    Please review last PHQ-9 score.          Passed - Patient is age 18 or older       Passed - Recent (6 mo) or future (30 days) visit within the authorizing " "provider's specialty    Patient had office visit in the last 6 months or has a visit in the next 30 days with authorizing provider or within the authorizing provider's specialty.  See \"Patient Info\" tab in inbasket, or \"Choose Columns\" in Meds & Orders section of the refill encounter.         Last Written Prescription Date:  12/06/17  Last Fill Quantity: 180 tablet,  # refills: 0   Last office visit: 11/29/2016 with prescribing provider:  Emy   Future Office Visit:   Next 5 appointments (look out 90 days)     Apr 09, 2018 12:30 PM CDT   PHYSICAL [facility] Ezekiel [facility]vedo [facility]   [facility]    [address] [address]   [phone]                      PHQ-9 SCORE 4/25/2013 5/13/2014   Total Score 4 4     No flowsheet data found.        "

## 2018-03-29 RX ORDER — TRIAMTERENE AND HYDROCHLOROTHIAZIDE 37.5; 25 MG/1; MG/1
1 CAPSULE ORAL EVERY MORNING
Qty: 30 CAPSULE | Refills: 0 | Status: SHIPPED | OUTPATIENT
Start: 2018-03-29 | End: 2018-04-09

## 2018-03-29 RX ORDER — SERTRALINE HYDROCHLORIDE 100 MG/1
TABLET, FILM COATED ORAL
Qty: 60 TABLET | Refills: 0 | Status: SHIPPED | OUTPATIENT
Start: 2018-03-29 | End: 2018-04-09

## 2018-03-29 NOTE — TELEPHONE ENCOUNTER
Next 5 appointments (look out 90 days)     Apr 09, 2018 12:30 PM CDT   [facility]    [address] [facility] [id number] [phone]                Sertraline Medication is being filled for 1 time refill only due to:  Patient needs to be seen because it has been more than one year since last visit.     Routing Triamterene-HCTZ refill request to provider for review/approval because:    Drug interaction warning: HIGH: Drug-Drug: triamterene-hydrochlorothiazide and potassium chloride  Co-administration of Potassium Preparations and Potassium-Sparing Diuretics may cause hyperkalemia, possibly leading to cardiac arrhythmias or cardiac arrest.     Soha OBRIEN RN

## 2018-04-09 ENCOUNTER — OFFICE VISIT (OUTPATIENT)
Dept: FAMILY MEDICINE | Facility: CLINIC | Age: 58
End: 2018-04-09
Payer: COMMERCIAL

## 2018-04-09 VITALS
TEMPERATURE: 98 F | BODY MASS INDEX: 29.88 KG/M2 | HEIGHT: 64 IN | OXYGEN SATURATION: 96 % | DIASTOLIC BLOOD PRESSURE: 86 MMHG | HEART RATE: 87 BPM | WEIGHT: 175 LBS | SYSTOLIC BLOOD PRESSURE: 155 MMHG

## 2018-04-09 DIAGNOSIS — E78.5 HYPERLIPIDEMIA LDL GOAL <130: ICD-10-CM

## 2018-04-09 DIAGNOSIS — F33.41 RECURRENT MAJOR DEPRESSIVE DISORDER, IN PARTIAL REMISSION (H): ICD-10-CM

## 2018-04-09 DIAGNOSIS — I10 BENIGN ESSENTIAL HYPERTENSION: ICD-10-CM

## 2018-04-09 DIAGNOSIS — I51.7 RIGHT VENTRICULAR HYPERTROPHY: ICD-10-CM

## 2018-04-09 DIAGNOSIS — R09.81 NASAL CONGESTION: ICD-10-CM

## 2018-04-09 DIAGNOSIS — Z00.00 ROUTINE GENERAL MEDICAL EXAMINATION AT A HEALTH CARE FACILITY: Primary | ICD-10-CM

## 2018-04-09 DIAGNOSIS — M81.0 OSTEOPOROSIS, UNSPECIFIED OSTEOPOROSIS TYPE, UNSPECIFIED PATHOLOGICAL FRACTURE PRESENCE: ICD-10-CM

## 2018-04-09 DIAGNOSIS — G90.523 COMPLEX REGIONAL PAIN SYNDROME TYPE 1 OF BOTH LOWER EXTREMITIES: ICD-10-CM

## 2018-04-09 DIAGNOSIS — I27.20 PULMONARY HTN (H): ICD-10-CM

## 2018-04-09 DIAGNOSIS — R25.2 SPASM: ICD-10-CM

## 2018-04-09 DIAGNOSIS — E78.6 LOW HDL (UNDER 40): ICD-10-CM

## 2018-04-09 DIAGNOSIS — G80.1 SPASTIC DIPLEGIC CEREBRAL PALSY (H): ICD-10-CM

## 2018-04-09 DIAGNOSIS — E55.9 VITAMIN D DEFICIENCY: ICD-10-CM

## 2018-04-09 DIAGNOSIS — M62.838 MUSCLE SPASM: ICD-10-CM

## 2018-04-09 LAB
ERYTHROCYTE [DISTWIDTH] IN BLOOD BY AUTOMATED COUNT: 14.2 % (ref 10–15)
HCT VFR BLD AUTO: 50.4 % (ref 40–53)
HGB BLD-MCNC: 16.3 G/DL (ref 13.3–17.7)
MCH RBC QN AUTO: 28.8 PG (ref 26.5–33)
MCHC RBC AUTO-ENTMCNC: 32.3 G/DL (ref 31.5–36.5)
MCV RBC AUTO: 89 FL (ref 78–100)
PLATELET # BLD AUTO: 290 10E9/L (ref 150–450)
RBC # BLD AUTO: 5.66 10E12/L (ref 4.4–5.9)
WBC # BLD AUTO: 9.7 10E9/L (ref 4–11)

## 2018-04-09 PROCEDURE — 80061 LIPID PANEL: CPT | Performed by: INTERNAL MEDICINE

## 2018-04-09 PROCEDURE — G0103 PSA SCREENING: HCPCS | Performed by: INTERNAL MEDICINE

## 2018-04-09 PROCEDURE — 36415 COLL VENOUS BLD VENIPUNCTURE: CPT | Performed by: INTERNAL MEDICINE

## 2018-04-09 PROCEDURE — 99396 PREV VISIT EST AGE 40-64: CPT | Performed by: INTERNAL MEDICINE

## 2018-04-09 PROCEDURE — 80053 COMPREHEN METABOLIC PANEL: CPT | Performed by: INTERNAL MEDICINE

## 2018-04-09 PROCEDURE — 85027 COMPLETE CBC AUTOMATED: CPT | Performed by: INTERNAL MEDICINE

## 2018-04-09 PROCEDURE — 82306 VITAMIN D 25 HYDROXY: CPT | Performed by: INTERNAL MEDICINE

## 2018-04-09 RX ORDER — ATORVASTATIN CALCIUM 20 MG
TABLET ORAL
Qty: 90 TABLET | Refills: 3 | Status: SHIPPED | OUTPATIENT
Start: 2018-04-09 | End: 2019-05-13

## 2018-04-09 RX ORDER — SERTRALINE HYDROCHLORIDE 100 MG/1
TABLET, FILM COATED ORAL
Qty: 180 TABLET | Refills: 3 | Status: SHIPPED | OUTPATIENT
Start: 2018-04-09 | End: 2019-07-16

## 2018-04-09 RX ORDER — DIAZEPAM 5 MG
TABLET ORAL
Qty: 60 TABLET | Refills: 0 | Status: SHIPPED | OUTPATIENT
Start: 2018-04-09 | End: 2018-11-21

## 2018-04-09 RX ORDER — TRIAMTERENE AND HYDROCHLOROTHIAZIDE 37.5; 25 MG/1; MG/1
1 CAPSULE ORAL EVERY MORNING
Qty: 90 CAPSULE | Refills: 3 | Status: SHIPPED | OUTPATIENT
Start: 2018-04-09 | End: 2019-07-07

## 2018-04-09 RX ORDER — POTASSIUM CHLORIDE 600 MG/1
16 TABLET, FILM COATED, EXTENDED RELEASE ORAL DAILY
Qty: 180 TABLET | Refills: 3 | Status: SHIPPED | OUTPATIENT
Start: 2018-04-09 | End: 2019-06-20

## 2018-04-09 RX ORDER — BACLOFEN 20 MG/1
20 TABLET ORAL
Qty: 450 TABLET | Refills: 3 | Status: SHIPPED | OUTPATIENT
Start: 2018-04-09 | End: 2019-07-16

## 2018-04-09 NOTE — PROGRESS NOTES
SUBJECTIVE:   CC: Ambrocio Delvalle is an 57 year old male who presents for preventative health visit.     The patient is here with his mother who he lives with, for complete physical exam.  He has not been checking blood pressure and high today.  He did not follow up with endo re osteopor.  His echo was done and mostly normal now as noted.  He has not done sleep eval.  He feels fine, no skin sores and mother checks him daily.  No c/o on review of systems, depression doing fine.               Past Medical History:      Past Medical History:   Diagnosis Date     Cerebral palsy (H)      Depression, major, in partial remission (H)      High cholesterol      Hypertension      Low HDL (under 40)      Lung nodule 2002    fu benign     Microhematuria 2007    ct neg cysto inflammation     Nasal congestion      Nephrolithiasis 1986    urol eval Dr. Beverly     Osteoporosis 2006, fu 2010    dexa 11/10 -1.5 fem neck and -2.9 total hip; fu 2016 and worse, -4.0 hip     Pharyngitis 2002    hosp fsd     Pulmonary HTN 2008    seen on echo, fu 12/16 not seen, nl ef, grade 1 dd     Reflex sympathetic dystrophy of lower extremity      Right ventricular hypertrophy 2008 on echo    ?due to sandie, had fu with Dr. Gaitan, never did sleep study, fu echo 12/16 no rvh seen     Sweats, sweating, excessive 2008    echo with rvh and pulm htn, ct chest, abd and pelvis with liver cysts and old lung nodule, cosyntropin stim test neg     Urethral stricture 2007    had cysto and ct done for hematuria as well     Vitamin D deficiency              Past Surgical History:      Past Surgical History:   Procedure Laterality Date     GENITOURINARY SURGERY      lithortipsy,  ureteral widened     ORTHOPEDIC SURGERY      left knee- abductors lengthed heel cords lengthened, tendons trransferred , patella lowered,             Social History:     Social History     Social History     Marital status: Single     Spouse name: N/A     Number of children: o     Years  of education: N/A     Occupational History      Disabled     Social History Main Topics     Smoking status: Never Smoker     Smokeless tobacco: Never Used     Alcohol use No     Drug use: No     Sexual activity: Not Currently     Other Topics Concern     Not on file     Social History Narrative             Family History:   reviewed         Allergies:     Allergies   Allergen Reactions     Penicillins Hives     Atorvastatin      Muscle aches to generic     Simvastatin      Muscle aches             Medications:     Current Outpatient Prescriptions   Medication Sig Dispense Refill     triamterene-hydrochlorothiazide (DYAZIDE) 37.5-25 MG per capsule Take 1 capsule by mouth every morning 90 capsule 3     sertraline (ZOLOFT) 100 MG tablet TAKE TWO TABLETS BY MOUTH DAILY -- due for office visit before further refills 180 tablet 3     LIPITOR 20 MG tablet TAKE ONE TABLET (20 mg) BY MOUTH ONE TIME DAILY 90 tablet 3     potassium chloride (KLOR-CON) 8 MEQ CR tablet Take 2 tablets (16 mEq) by mouth daily 180 tablet 3     FLONASE 50 MCG/ACT spray Spray 1-2 sprays into both nostrils daily 16 g 5     baclofen (LIORESAL) 20 MG tablet Take 1 tablet (20 mg) by mouth 5 times daily 450 tablet 3     diazepam (VALIUM) 5 MG tablet TAKE ONE TABLET (5MG) BY MOUTH ONCE NIGHTLY AS NEEDED 60 tablet 0     ibuprofen (ADVIL,MOTRIN) 800 MG tablet Take 1 tablet by mouth every 8 hours as needed. 90 tablet 3     Cholecalciferol (VITAMIN D PO) Take  by mouth.       [DISCONTINUED] triamterene-hydrochlorothiazide (DYAZIDE) 37.5-25 MG per capsule Take 1 capsule by mouth every morning 30 capsule 0     [DISCONTINUED] sertraline (ZOLOFT) 100 MG tablet TAKE TWO TABLETS BY MOUTH DAILY -- due for office visit before further refills 60 tablet 0     [DISCONTINUED] LIPITOR 20 MG tablet TAKE ONE TABLET (20 mg) BY MOUTH ONE TIME DAILY 30 tablet 0     [DISCONTINUED] potassium chloride (KLOR-CON) 8 MEQ CR tablet Take 2 tablets (16 mEq) by mouth daily 30 tablet 0  "    [DISCONTINUED] diazepam (VALIUM) 5 MG tablet TAKE ONE TABLET (5MG) BY MOUTH ONCE NIGHTLY AS NEEDED  60 tablet 0     [DISCONTINUED] FLONASE 50 MCG/ACT nasal spray Spray 1-2 sprays into both nostrils daily 16 g 5     [DISCONTINUED] baclofen (LIORESAL) 20 MG tablet Take 1 tablet (20 mg) by mouth 5 times daily 450 tablet 3     ORDER FOR DME Equipment being ordered: Hospital Bed 1 Units 0     ORDER FOR DME Equipment being ordered: Wheelchair (Patient not taking: Reported on 4/9/2018) 1 Units 0               Review of Systems:   The 10 point Review of Systems is negative other than noted in the HPI           Physical Exam:   Blood pressure 155/86, pulse 87, temperature 98  F (36.7  C), temperature source Oral, height 5' 4\" (1.626 m), weight 175 lb (79.4 kg), SpO2 96 %.    Exam:  Patient examined in w.c, I did not remove his clothes as mother checks him carefully, somewhat limited exam due to his condition  Constitutional: healthy appearing, alert and in no distress  Heent: Normocephalic. Head without obvious masses or lesions. PERRLDC, left eye deviates outward. Mouth exam within normal limits: tongue, mucous membranes, posterior pharynx all normal, no lesions or abnormalities seen.  Tm's and canals within normal limits bilaterally. Neck supple, no nuchal rigidity or masses. No supraclavicular, or cervical adenopathy. Thyroid symmetric, no masses.  Cardiovascular: Regular rate and rhythm, no murmer, rub or gallops.  JVP not elevated, no edema.  Carotids within normal limits bilaterally, no bruits.  Respiratory: Normal respiratory effort.  Lungs clear, normal flow, no wheezing or crackles.  Breasts: Not examined  Gastrointestinal: Normal active bowel sounds.   Soft, not tender, no masses, guarding or rebound.  No hepatosplenomegaly.   Genitourinary: Rectal not done  Musculoskeletal: extremities normal, no gross deformities noted.  Skin: no suspicious lesions or rashes   Neurologic: Mental status within normal limits.  " Speech fluent.   Psychiatric: mentation appears normal and affect normal.         Data:   Labs sent        Assessment:   1. Normal complete physical exam  2. Cerebral palsy, stable  3. Depression, doing fine  4. Hypertension, ?control  5. Osteopor, see endo  6. pulm hypertension, rvh, resolved  7. Low hdl, on statin  8. Chronic muscle spasms, on meds with no signs abuse  9. hcm         Plan:   Fit testing, did not want colon  See endo  Follow up sleep clinic  Letter with labs  Monitor blood pressure and if up call  To get shingrx at pharm      Ezekiel Quevedo M.D.          Healthy Habits:    Do you get at least three servings of calcium containing foods daily (dairy, green leafy vegetables, etc.)? yes    Amount of exercise or daily activities, outside of work:     Problems taking medications regularly sometimes hard to swallow    Medication side effects: No    Have you had an eye exam in the past two years? no    Do you see a dentist twice per year? yes    Do you have sleep apnea, excessive snoring or daytime drowsiness?yes           Today's PHQ-2 Score:   PHQ-2 ( 1999 Pf[employer]) 8/21/2014 4/25/2013   Q1: Little interest or pleasure in doing things 0 -   Q2: Feeling down, depressed or hopeless 0 0   PHQ-2 Score 0 -       Abuse: Current or Past(Physical, Sexual or Emotional)- No  Do you feel safe in your environment - Yes    Social History   Substance Use Topics     Smoking status: Never Smoker     Smokeless tobacco: Never Used     Alcohol use No      If you drink alcohol do you typically have >3 drinks per day or >7 drinks per week? No

## 2018-04-09 NOTE — MR AVS SNAPSHOT
After Visit Summary   4/9/2018    Ambrocio Delvalle    MRN: [id number]           Patient Information     Date Of Birth          1960        Visit Information        Provider Department      4/9/2018 12:30 PM Ezekiel Quevedo MD [facility]        Today's Diagnoses     Routine general medical examination at a health care facility    -  1    Recurrent major depressive disorder, in partial remission (H)        Spastic diplegic cerebral palsy (H)        Pulmonary HTN        Low HDL (under 40)        Osteoporosis, unspecified osteoporosis type, unspecified pathological fracture presence        Vitamin D deficiency        Right ventricular hypertrophy        Complex regional pain syndrome type 1 of both lower extremities        Benign essential hypertension        Hyperlipidemia LDL goal <130        Muscle spasm        Nasal congestion        Spasm          Care Instructions    See Dr. Gale for the bones    Get the new shingles shot    Return the stool card.    I will send you the labs.    Come back to check your blood pressure.    Ezekiel Quevedo M.D.              Preventive Health Recommendations  Male Ages 50 - 64    Yearly exam:             See your health care provider every year in order to  o   Review health changes.   o   Discuss preventive care.    o   Review your medicines if your doctor has prescribed any.     Have a cholesterol test every 5 years, or more frequently if you are at risk for high cholesterol/heart disease.     Have a diabetes test (fasting glucose) every three years. If you are at risk for diabetes, you should have this test more often.     Have a colonoscopy at age 50, or have a yearly FIT test (stool test). These exams will check for colon cancer.      Talk with your health care provider about whether or not a prostate cancer screening test (PSA) is right for you.    You should be tested each year for STDs (sexually transmitted diseases), if you re at risk.      Shots: Get a flu shot each year. Get a tetanus shot every 10 years.     Nutrition:    Eat at least 5 servings of fruits and vegetables daily.     Eat whole-grain bread, whole-wheat pasta and brown rice instead of white grains and rice.     Talk to your provider about Calcium and Vitamin D.     Lifestyle    Exercise for at least 150 minutes a week (30 minutes a day, 5 days a week). This will help you control your weight and prevent disease.     Limit alcohol to one drink per day.     No smoking.     Wear sunscreen to prevent skin cancer.     See your dentist every six months for an exam and cleaning.     See your eye doctor every 1 to 2 years.            Follow-ups after your visit        Additional Services     ENDOCRINOLOGY ADULT REFERRAL       Your provider has referred you to: [facility]:  Ariel Rodrigez  [phone]  [email or web address]      Please be aware that coverage of these services is subject to the terms and limitations of your health insurance plan.  Call member services at your health plan with any benefit or coverage questions.      Please bring the following to your appointment:    >>   Any x-rays, CTs or MRIs which have been performed.  Contact the facility where they were done to arrange for  prior to your scheduled appointment.    >>   List of current medications   >>   This referral request   >>   Any documents/labs given to you for this referral                  Future tests that were ordered for you today     Open Future Orders        Priority Expected Expires Ordered    Fecal colorectal cancer screen (FIT) Routine 4/30/2018 7/2/2018 4/9/2018            Who to contact     If you have questions or need follow up information about today's clinic visit or your schedule please contact [facility]ON[facility] RODRIGE[facility] directly at [phone].  Normal or non-critical lab and imaging results will be communicated to you by Анна  "letter or phone within 4 business days after the clinic has received the results. If you do not hear from us within 7 days, please contact the clinic through [employer] or phone. If you have a critical or abnormal lab result, we will notify you by phone as soon as possible.  Submit refill requests through [employer] or call your pharmacy and they will forward the refill request to us. Please allow 3 business days for your refill to be completed.          Additional Information About Your Visit        [employer] Information     [employer] lets you send messages to your doctor, view your test results, renew your prescriptions, schedule appointments and more. To sign up, go to [email or web address] . Click on \"Log in\" on the left side of the screen, which will take you to the Welcome page. Then click on \"Sign up Now\" on the right side of the page.     You will be asked to enter the access code listed below, as well as some personal information. Please follow the directions to create your username and password.     Your access code is: [email or web address]  Expires: 2018 12:53 PM     Your access code will  in 90 days. If you need help or a new code, please call your [facility] clinic or [phone].        Care EveryWhere ID     This is your Care EveryWhere ID. This could be used by other organizations to access your [facility] medical records  [id number]        Your Vitals Were     Pulse Temperature Height Pulse Oximetry BMI (Body Mass Index)       87 98  F (36.7  C) (Oral) 5' 4\" (1.626 m) 96% 30.04 kg/m2        Blood Pressure from Last 3 Encounters:   18 155/86   16 139/85   09/18/15 134/86    Weight from Last 3 Encounters:   18 175 lb (79.4 kg)   16 186 lb (84.4 kg)   09/18/15 186 lb (84.4 kg)              We Performed the Following     CBC with platelets     Comprehensive metabolic panel     ENDOCRINOLOGY ADULT REFERRAL     Lipid panel reflex to direct LDL Non-fasting     Prostate spec antigen screen  "    Vitamin D Deficiency          Today's Medication Changes          These changes are accurate as of 4/9/18 12:53 PM.  If you have any questions, ask your nurse or doctor.               These medicines have changed or have updated prescriptions.        Dose/Directions    diazepam 5 MG tablet   Commonly known as:  VALIUM   This may have changed:  See the new instructions.   Used for:  Spasm   Changed by:  Ezekiel Quevedo MD        TAKE ONE TABLET (5MG) BY MOUTH ONCE NIGHTLY AS NEEDED   Quantity:  60 tablet   Refills:  0       sertraline 100 MG tablet   Commonly known as:  ZOLOFT   This may have changed:  See the new instructions.   Used for:  Recurrent major depressive disorder, in partial remission (H)   Changed by:  Ezekiel Quevedo MD        TAKE TWO TABLETS BY MOUTH DAILY -- due for office visit before further refills   Quantity:  180 tablet   Refills:  3         Stop taking these medicines if you haven't already. Please contact your care team if you have questions.     azithromycin 250 MG tablet   Commonly known as:  ZITHROMAX   Stopped by:  Ezekiel Quevedo MD           TYLENOL SINUS NIGHT TIME PO   Stopped by:  Ezekiel Quevedo MD           TYLENOL SINUS SEVERE PO   Stopped by:  Ezekiel Quevedo MD                Where to get your medicines      These medications were sent to [id number] - [address] [address]     Phone:  [phone]     baclofen 20 MG tablet    FLONASE 50 MCG/ACT spray    LIPITOR 20 MG tablet    potassium chloride 8 MEQ CR tablet    sertraline 100 MG tablet    triamterene-hydrochlorothiazide 37.5-25 MG per capsule         Some of these will need a paper prescription and others can be bought over the counter.  Ask your nurse if you have questions.     Bring a paper prescription for each of these medications     diazepam 5 MG tablet                Primary Care Provider Office Phone # Fax #    Ezekiel  Singh Quevedo -[phone]       [address]  [facility] [address]        Equal Access to Services     BLAKEEMRE CAROLYNN : Hadmeryl hubbard betidwayne Bellaeddie, wakadida lionjoanha, qajohnta kajunida ese, clark florain hayaafercho wilsontucker rose latiffanyfercho pelletier. So [facility] [phone].    ATENCIÓN: Si habla español, tiene a narvaez disposición servicios gratuitos de asistencia lingüística. Llame al [phone].    We comply with applicable federal civil rights laws and [place] laws. We do not discriminate on the basis of race, color, national origin, age, disability, sex, sexual orientation, or gender identity.            Thank you!     Thank you for choosing [facility]  for your care. Our goal is always to provide you with excellent care. Hearing back from our patients is one way we can continue to improve our services. Please take a few minutes to complete the written survey that you may receive in the mail after your visit with us. Thank you!             Your Updated Medication List - Protect others around you: Learn how to safely use, store and throw away your medicines at www.disposemymeds.org.          This list is accurate as of 4/9/18 12:53 PM.  Always use your most recent med list.                   Brand Name Dispense Instructions for use Diagnosis    baclofen 20 MG tablet    LIORESAL    450 tablet    Take 1 tablet (20 mg) by mouth 5 times daily    Spasm       diazepam 5 MG tablet    VALIUM    60 tablet    TAKE ONE TABLET (5MG) BY MOUTH ONCE NIGHTLY AS NEEDED    Spasm       FLONASE 50 MCG/ACT spray   Generic drug:  fluticasone     16 g    Spray 1-2 sprays into both nostrils daily    Nasal congestion       ibuprofen 800 MG tablet    ADVIL/MOTRIN    90 tablet    Take 1 tablet by mouth every 8 hours as needed.    HTN (hypertension)       LIPITOR 20 MG tablet   Generic drug:  atorvastatin     90 tablet    TAKE ONE TABLET (20 mg) BY MOUTH ONE TIME DAILY    Hyperlipidemia LDL goal <130       order for DME      1 Units    Equipment being ordered: Hospital Bed    Cerebral palsy (H), Reflex sympathetic dystrophy of lower extremity, bilateral       order for DME     1 Units    Equipment being ordered: Wheelchair    Cerebral palsy (H), Reflex sympathetic dystrophy of lower extremity, bilateral       potassium chloride 8 MEQ CR tablet    KLOR-CON    180 tablet    Take 2 tablets (16 mEq) by mouth daily    Muscle spasm, Benign essential hypertension       sertraline 100 MG tablet    ZOLOFT    180 tablet    TAKE TWO TABLETS BY MOUTH DAILY -- due for office visit before further refills    Recurrent major depressive disorder, in partial remission (H)       triamterene-hydrochlorothiazide 37.5-25 MG per capsule    DYAZIDE    90 capsule    Take 1 capsule by mouth every morning    Benign essential hypertension       VITAMIN D PO      Take  by mouth.

## 2018-04-09 NOTE — LETTER
[facility]  [address] [facility]  [address]  [place]  [address]  Tel: [phone]    April 10, 2018    Ambrocio Delvalle  [address] [facility] [address]        Dear Mr. Delvalle,    It was a pleasure seeing you for your physical examination.  I wanted to get back to you with your test results.  I have enclosed a copy for your review.      I am happy to report that your cbc or complete blood count is normal with no signs of anemia, leukemia or platelet abnormalities.  Your chemistry panel shows no signs of diabetes.  Your blood salts, kidney tests, liver tests, psa, vitamin D level, and proteins are all fine.    Your total cholesterol is 197 with the normal range being below 200.  Your HDL or good cholesterol is 36 with the normal range being above 40.  Your LDL or bad cholesterol is 128 with the normal range being below 130.  Overall these numbers are fine.    I am happy to bring you this overall excellent report.  Please return the stool card, see the endocrine doctor and monitor your blood pressure.  If you have any questions please call me.    Sincerely,    Ezekiel Quevedo MD / macy          Enclosure: Lab Results      Resulted Orders   CBC with platelets   Result Value Ref Range    WBC 9.7 4.0 - 11.0 10e9/L    RBC Count 5.66 4.4 - 5.9 10e12/L    Hemoglobin 16.3 13.3 - 17.7 g/dL    Hematocrit 50.4 40.0 - 53.0 %    MCV 89 78 - 100 fl    MCH 28.8 26.5 - 33.0 pg    MCHC 32.3 31.5 - 36.5 g/dL    RDW 14.2 10.0 - 15.0 %    Platelet Count 290 150 - 450 10e9/L   Comprehensive metabolic panel   Result Value Ref Range    Sodium 137 133 - 144 mmol/L    Potassium 3.6 3.4 - 5.3 mmol/L    Chloride 102 94 - 109 mmol/L    Carbon Dioxide 30 20 - 32 mmol/L    Anion Gap 5 3 - 14 mmol/L    Glucose 89 70 - 99 mg/dL      Comment:      Fasting specimen    Urea Nitrogen 16 7 - 30 mg/dL    Creatinine 0.83 0.66 - 1.25 mg/dL    GFR Estimate >90 >60 mL/min/1.7m2      Comment:      Non  GFR Calc    GFR  Estimate If Black >90 >60 mL/min/1.7m2      Comment:       GFR Calc    Calcium 9.1 8.5 - 10.1 mg/dL    Bilirubin Total 0.5 0.2 - 1.3 mg/dL    Albumin 4.1 3.4 - 5.0 g/dL    Protein Total 7.8 6.8 - 8.8 g/dL    Alkaline Phosphatase 131 40 - 150 U/L    ALT 32 0 - 70 U/L    AST 25 0 - 45 U/L   Lipid panel reflex to direct LDL Non-fasting   Result Value Ref Range    Cholesterol 197 <200 mg/dL    Triglycerides 166 (H) <150 mg/dL      Comment:      Borderline high:  150-199 mg/dl  High:             200-499 mg/dl  Very high:       >499 mg/dl  Fasting specimen      HDL Cholesterol 36 (L) >39 mg/dL    LDL Cholesterol Calculated 128 (H) <100 mg/dL      Comment:      Above desirable:  100-129 mg/dl  Borderline High:  130-159 mg/dL  High:             160-189 mg/dL  Very high:       >189 mg/dl      Non HDL Cholesterol 161 (H) <130 mg/dL      Comment:      Above Desirable:  130-159 mg/dl  Borderline high:  160-189 mg/dl  High:             190-219 mg/dl  Very high:       >219 mg/dl     Prostate spec antigen screen   Result Value Ref Range    PSA 1.00 0 - 4 ug/L      Comment:      Assay Method:  Chemiluminescence using Siemens Vista analyzer   Vitamin D Deficiency   Result Value Ref Range    Vitamin D Deficiency screening 57 20 - 75 ug/L      Comment:      Season, race, dietary intake, and treatment affect the concentration of   25-hydroxy-Vitamin D. Values may decrease during winter months and increase   during summer months. Values 20-29 ug/L may indicate Vitamin D insufficiency   and values <20 ug/L may indicate Vitamin D deficiency.  Vitamin D determination is routinely performed by an immunoassay specific for   25 hydroxyvitamin D3.  If an individual is on vitamin D2 (ergocalciferol)   supplementation, please specify 25 OH vitamin D2 and D3 level determination by   LCMSMS test [id number].

## 2018-04-09 NOTE — PATIENT INSTRUCTIONS
See Dr. Gale for the bones    Get the new shingles shot    Return the stool card.    I will send you the labs.    Come back to check your blood pressure.    Ezekiel Quevedo M.D.              Preventive Health Recommendations  Male Ages 50   64    Yearly exam:             See your health care provider every year in order to  o   Review health changes.   o   Discuss preventive care.    o   Review your medicines if your doctor has prescribed any.     Have a cholesterol test every 5 years, or more frequently if you are at risk for high cholesterol/heart disease.     Have a diabetes test (fasting glucose) every three years. If you are at risk for diabetes, you should have this test more often.     Have a colonoscopy at age 50, or have a yearly FIT test (stool test). These exams will check for colon cancer.      Talk with your health care provider about whether or not a prostate cancer screening test (PSA) is right for you.    You should be tested each year for STDs (sexually transmitted diseases), if you re at risk.     Shots: Get a flu shot each year. Get a tetanus shot every 10 years.     Nutrition:    Eat at least 5 servings of fruits and vegetables daily.     Eat whole-grain bread, whole-wheat pasta and brown rice instead of white grains and rice.     Talk to your provider about Calcium and Vitamin D.     Lifestyle    Exercise for at least 150 minutes a week (30 minutes a day, 5 days a week). This will help you control your weight and prevent disease.     Limit alcohol to one drink per day.     No smoking.     Wear sunscreen to prevent skin cancer.     See your dentist every six months for an exam and cleaning.     See your eye doctor every 1 to 2 years.

## 2018-04-10 LAB
ALBUMIN SERPL-MCNC: 4.1 G/DL (ref 3.4–5)
ALP SERPL-CCNC: 131 U/L (ref 40–150)
ALT SERPL W P-5'-P-CCNC: 32 U/L (ref 0–70)
ANION GAP SERPL CALCULATED.3IONS-SCNC: 5 MMOL/L (ref 3–14)
AST SERPL W P-5'-P-CCNC: 25 U/L (ref 0–45)
BILIRUB SERPL-MCNC: 0.5 MG/DL (ref 0.2–1.3)
BUN SERPL-MCNC: 16 MG/DL (ref 7–30)
CALCIUM SERPL-MCNC: 9.1 MG/DL (ref 8.5–10.1)
CHLORIDE SERPL-SCNC: 102 MMOL/L (ref 94–109)
CHOLEST SERPL-MCNC: 197 MG/DL
CO2 SERPL-SCNC: 30 MMOL/L (ref 20–32)
CREAT SERPL-MCNC: 0.83 MG/DL (ref 0.66–1.25)
DEPRECATED CALCIDIOL+CALCIFEROL SERPL-MC: 57 UG/L (ref 20–75)
GFR SERPL CREATININE-BSD FRML MDRD: >90 ML/MIN/1.7M2
GLUCOSE SERPL-MCNC: 89 MG/DL (ref 70–99)
HDLC SERPL-MCNC: 36 MG/DL
LDLC SERPL CALC-MCNC: 128 MG/DL
NONHDLC SERPL-MCNC: 161 MG/DL
POTASSIUM SERPL-SCNC: 3.6 MMOL/L (ref 3.4–5.3)
PROT SERPL-MCNC: 7.8 G/DL (ref 6.8–8.8)
PSA SERPL-ACNC: 1 UG/L (ref 0–4)
SODIUM SERPL-SCNC: 137 MMOL/L (ref 133–144)
TRIGL SERPL-MCNC: 166 MG/DL

## 2018-04-10 NOTE — PROGRESS NOTES
It was a pleasure seeing you for your physical examination.  I wanted to get back to you with your test results.  I have enclosed a copy for your review.      I am happy to report that your cbc or complete blood count is normal with no signs of anemia, leukemia or platelet abnormalities.  Your chemistry panel shows no signs of diabetes.  Your blood salts, kidney tests, liver tests, psa, vitamin D level, and proteins are all fine.    Your total cholesterol is 197 with the normal range being below 200.  Your HDL or good cholesterol is 36 with the normal range being above 40.  Your LDL or bad cholesterol is 128 with the normal range being below 130.  Overall these numbers are fine.    I am happy to bring you this overall excellent report.  Please return the stool card, see the endocrine doctor and monitor your blood pressure.  If you have any questions please call me.    Ezekiel Quevedo M.D.

## 2018-04-18 ENCOUNTER — TELEPHONE (OUTPATIENT)
Dept: FAMILY MEDICINE | Facility: CLINIC | Age: 58
End: 2018-04-18

## 2018-04-18 DIAGNOSIS — R09.81 NASAL CONGESTION: ICD-10-CM

## 2018-04-18 NOTE — TELEPHONE ENCOUNTER
Prior Authorization Retail Medication Request    Medication/Dose: flonase  ICD code (if different than what is on RX):  R09.81  Previously Tried and Failed:  fluticasone  Rationale:      Insurance Name:  [employer]  Insurance ID:  [id number]       Pharmacy Information (if different than what is on RX)  Name:  [facility]  Phone:  [phone]

## 2018-04-23 NOTE — TELEPHONE ENCOUNTER
PRIOR AUTHORIZATION DENIED    Medication: flonase DENIED    Denial Date: 4/21/2018    Denial Rational: Denied as this medication is considered an over-the-counter product and is excluded from patients plan:          Appeal Information:

## 2018-09-06 ENCOUNTER — OFFICE VISIT (OUTPATIENT)
Dept: FAMILY MEDICINE | Facility: CLINIC | Age: 58
End: 2018-09-06
Payer: COMMERCIAL

## 2018-09-06 VITALS
DIASTOLIC BLOOD PRESSURE: 83 MMHG | TEMPERATURE: 97.5 F | HEART RATE: 78 BPM | SYSTOLIC BLOOD PRESSURE: 139 MMHG | HEIGHT: 64 IN | OXYGEN SATURATION: 96 % | BODY MASS INDEX: 29.88 KG/M2 | WEIGHT: 175 LBS

## 2018-09-06 DIAGNOSIS — G80.0 SPASTIC QUADRIPLEGIC CEREBRAL PALSY (H): Primary | ICD-10-CM

## 2018-09-06 DIAGNOSIS — F33.41 RECURRENT MAJOR DEPRESSIVE DISORDER, IN PARTIAL REMISSION (H): ICD-10-CM

## 2018-09-06 PROCEDURE — 99214 OFFICE O/P EST MOD 30 MIN: CPT | Performed by: INTERNAL MEDICINE

## 2018-09-06 NOTE — MR AVS SNAPSHOT
"              After Visit Summary   9/6/2018    Ambrocio Delvalle    MRN: [id number]           Patient Information     Date Of Birth          1960        Visit Information        Provider Department      9/6/2018 1:00 PM Ezekiel Quevedo MD [facility]        Today's Diagnoses     Spastic quadriplegic cerebral palsy (H)    -  1    Recurrent major depressive disorder, in partial remission (H)           Follow-ups after your visit        Who to contact     If you have questions or need follow up information about today's clinic visit or your schedule please contact [facility] directly at [phone].  Normal or non-critical lab and imaging results will be communicated to you by MyChart, letter or phone within 4 business days after the clinic has received the results. If you do not hear from us within 7 days, please contact the clinic through PassportParkinghart or phone. If you have a critical or abnormal lab result, we will notify you by phone as soon as possible.  Submit refill requests through [facility] or call your pharmacy and they will forward the refill request to us. Please allow 3 business days for your refill to be completed.          Additional Information About Your Visit        MyChart Information     [facility] gives you secure access to your electronic health record. If you see a primary care provider, you can also send messages to your care team and make appointments. If you have questions, please call your primary care clinic.  If you do not have a primary care provider, please call [phone] and they will assist you.        Care EveryWhere ID     This is your Care EveryWhere ID. This could be used by other organizations to access your [facility] medical records  [id number]        Your Vitals Were     Pulse Temperature Height Pulse Oximetry BMI (Body Mass Index)       78 97.5  F (36.4  C) (Tympanic) 5' 4\" (1.626 m) 96% 30.04 kg/m2        Blood Pressure from Last 3 Encounters: "   09/06/18 139/83   04/09/18 155/86   11/29/16 139/85    Weight from Last 3 Encounters:   09/06/18 175 lb (79.4 kg)   04/09/18 175 lb (79.4 kg)   11/29/16 186 lb (84.4 kg)              Today, you had the following     No orders found for display         Today's Medication Changes          These changes are accurate as of 9/6/18  1:33 PM.  If you have any questions, ask your nurse or doctor.               Start taking these medicines.        Dose/Directions    order for DME   Used for:  Spastic quadriplegic cerebral palsy (H)   Started by:  Ezekiel Quevedo MD        Equipment being ordered: Wheelchair  Power wheelchair with tilt and recline function   Quantity:  1 Units   Refills:  0            Where to get your medicines      Some of these will need a paper prescription and others can be bought over the counter.  Ask your nurse if you have questions.     Bring a paper prescription for each of these medications     order for DME                Primary Care Provider Office Phone # Fax #    Ezekiel Quevedo [phone] [address] [address]        Equal Access to Services     : Hadii leo ku hadasho Soeddie, waaxda luqadaha, qaybta kaalmada adetuckeryada, clark dozier . So [facility] [phone].    ATENCIÓN: Si habla español, tiene a narvaez disposición servicios gratuitos de asistencia lingüística. Llame al [phone].    We comply with applicable federal civil rights laws and [place] laws. We do not discriminate on the basis of race, color, national origin, age, disability, sex, sexual orientation, or gender identity.            Thank you!     Thank you for choosing [facility]  for your care. Our goal is always to provide you with excellent care. Hearing back from our patients is one way we can continue to improve our services. Please take a few minutes to complete the written survey that you may receive in the mail after  your visit with us. Thank you!             Your Updated Medication List - Protect others around you: Learn how to safely use, store and throw away your medicines at www.disposemymeds.org.          This list is accurate as of 9/6/18  1:33 PM.  Always use your most recent med list.                   Brand Name Dispense Instructions for use Diagnosis    baclofen 20 MG tablet    LIORESAL    450 tablet    Take 1 tablet (20 mg) by mouth 5 times daily    Spasm       diazepam 5 MG tablet    VALIUM    60 tablet    TAKE ONE TABLET (5MG) BY MOUTH ONCE NIGHTLY AS NEEDED    Spasm       FLONASE 50 MCG/ACT spray   Generic drug:  fluticasone     16 g    Spray 1-2 sprays into both nostrils daily    Nasal congestion       ibuprofen 800 MG tablet    ADVIL/MOTRIN    90 tablet    Take 1 tablet by mouth every 8 hours as needed.    HTN (hypertension)       LIPITOR 20 MG tablet   Generic drug:  atorvastatin     90 tablet    TAKE ONE TABLET (20 mg) BY MOUTH ONE TIME DAILY    Hyperlipidemia LDL goal <130       order for DME     1 Units    Equipment being ordered: Hospital Bed    Cerebral palsy (H), Reflex sympathetic dystrophy of lower extremity, bilateral       order for DME     1 Units    Equipment being ordered: Wheelchair    Cerebral palsy (H), Reflex sympathetic dystrophy of lower extremity, bilateral       order for DME     1 Units    Equipment being ordered: Wheelchair  Power wheelchair with tilt and recline function    Spastic quadriplegic cerebral palsy (H)       potassium chloride 8 MEQ CR tablet    KLOR-CON    180 tablet    Take 2 tablets (16 mEq) by mouth daily    Muscle spasm, Benign essential hypertension       sertraline 100 MG tablet    ZOLOFT    180 tablet    TAKE TWO TABLETS BY MOUTH DAILY -- due for office visit before further refills    Recurrent major depressive disorder, in partial remission (H)       triamterene-hydrochlorothiazide 37.5-25 MG per capsule    DYAZIDE    90 capsule    Take 1 capsule by mouth every morning     Benign essential hypertension       VITAMIN D PO      Take  by mouth.

## 2018-09-06 NOTE — LETTER
[place] [address] [address] [facility] [address]  Phone: [phone]    09/06/18    Ambrocio Delvalle  [address] [facility] [address]      To whom it may concern:     My patient listed above requires a new power wheelchair/mobility device due to his quadriplegic cerebra palsy.    Sincerely,        Ezekiel Quevedo MD

## 2018-09-06 NOTE — PROGRESS NOTES
Ambrocio Delvalle is a 58 year old male who presents for evaluation in order to receive new power mobility/power wheelchair.    The patient uses his power wheelchair to get around.  He has a diagnosis of spastic quadriplegic cerebral palsy since birth, and has used a powerwheelchair for over 20 years in order to maintain his independence..  He needs a power wheelchair in order to get around both his house and when out of the house.  Due to his c.p. He is unable to use a standard wheelchair.  He needs a wheelchair that can pivot as well as move in different positions in order to avoid sores.  He is currently unable to ambulate on his own and I do not anticipate this will change over time.  He is unable to stand from a seated or any other position without assistance or even with assistance.  He lives in a single family home with his mother who is unable to assist with many of his adl's.  She would not be able to help him in a standard wheelchair or push a standard wheelchair.  He is in a one level home and with the use of the power wheelchair is able to get around independently in his home.    He needs a power lift to get from his bed to the commode.  When through with the commode he uses the power lift to get into his wheelchair.  During the day he is in the wheelchair throughout the day and uses this exclusively to get around, both in and out of the home.  He needs a tilt and recline wheelchair in order to change positions in order to avoid pressure sores.  He would not be able to use a scooter due to his balance issue and the need to use a tile and recline function.    The patient otherwise is doing fine.      Past Medical History:   Diagnosis Date     Cerebral palsy (H)      Depression, major, in partial remission (H)      High cholesterol      Hypertension      Low HDL (under 40)      Lung nodule 2002    fu benign     Microhematuria 2007    ct neg cysto inflammation     Nasal congestion      Nephrolithiasis 1986     urol eval Dr. Beverly     Osteoporosis 2006, fu 2010    dexa 11/10 -1.5 fem neck and -2.9 total hip; fu 2016 and worse, -4.0 hip     Pharyngitis 2002    hosp fsd     Pulmonary HTN 2008    seen on echo, fu 12/16 not seen, nl ef, grade 1 dd     Reflex sympathetic dystrophy of lower extremity      Right ventricular hypertrophy 2008 on echo    ?due to sandie, had fu with Dr. Gaitan, never did sleep study, fu echo 12/16 no rvh seen     Sweats, sweating, excessive 2008    echo with rvh and pulm htn, ct chest, abd and pelvis with liver cysts and old lung nodule, cosyntropin stim test neg     Urethral stricture 2007    had cysto and ct done for hematuria as well     Vitamin D deficiency      Past Surgical History:   Procedure Laterality Date     GENITOURINARY SURGERY      lithortipsy,  ureteral widened     ORTHOPEDIC SURGERY      left knee- abductors lengthed heel cords lengthened, tendons trransferred , patella lowered,     Social History     Social History     Marital status: Single     Spouse name: N/A     Number of children: o     Years of education: N/A     Occupational History      Disabled     Social History Main Topics     Smoking status: Never Smoker     Smokeless tobacco: Never Used     Alcohol use No     Drug use: No     Sexual activity: Not Currently     Other Topics Concern     Not on file     Social History Narrative     Current Outpatient Prescriptions   Medication Sig Dispense Refill     baclofen (LIORESAL) 20 MG tablet Take 1 tablet (20 mg) by mouth 5 times daily 450 tablet 3     Cholecalciferol (VITAMIN D PO) Take  by mouth.       diazepam (VALIUM) 5 MG tablet TAKE ONE TABLET (5MG) BY MOUTH ONCE NIGHTLY AS NEEDED 60 tablet 0     FLONASE 50 MCG/ACT spray Spray 1-2 sprays into both nostrils daily 16 g 5     ibuprofen (ADVIL,MOTRIN) 800 MG tablet Take 1 tablet by mouth every 8 hours as needed. 90 tablet 3     LIPITOR 20 MG tablet TAKE ONE TABLET (20 mg) BY MOUTH ONE TIME DAILY 90 tablet 3     order for DME  "Equipment being ordered: Wheelchair    Power wheelchair with tilt and recline function 1 Units 0     ORDER FOR DME Equipment being ordered: Hospital Bed 1 Units 0     ORDER FOR DME Equipment being ordered: Wheelchair 1 Units 0     potassium chloride (KLOR-CON) 8 MEQ CR tablet Take 2 tablets (16 mEq) by mouth daily 180 tablet 3     sertraline (ZOLOFT) 100 MG tablet TAKE TWO TABLETS BY MOUTH DAILY -- due for office visit before further refills 180 tablet 3     triamterene-hydrochlorothiazide (DYAZIDE) 37.5-25 MG per capsule Take 1 capsule by mouth every morning 90 capsule 3     Allergies   Allergen Reactions     Penicillins Hives     Atorvastatin      Muscle aches to generic     Simvastatin      Muscle aches     FAMILY HISTORY NOTED AND REVIEWED    REVIEW OF SYSTEMS: above    PHYSICAL EXAM    /83 (BP Location: Right arm, Cuff Size: Adult Large)  Pulse 78  Temp 97.5  F (36.4  C) (Tympanic)  Ht 5' 4\" (1.626 m)  Wt 175 lb (79.4 kg)  SpO2 96%  BMI 30.04 kg/m2    Patient appears non toxic  Lungs clear  cv reglar rate and rhythm, no jvp  Abdomen non-tender  Neuro: alert, speech fluent, strength 4+/5 bilat ue, lower extrem strength minimal.    He has restricted range of motion of his lower extremity due to stiffness.  He has some restricted range of motion of upper extremities due to stiffness, but able to do more.  I was unable to do balance/gait testing due to his profound leg weakness    ASSESSMENT:  1. Spastic quadriplegic due to cerebral palsy, requiring power wheelchair for independence as noted in hpi  2. Depression, doing well    PLAN:  Forms done today    Time 30 minutes    Ezekiel Quevedo M.D.            "

## 2018-09-07 ASSESSMENT — PATIENT HEALTH QUESTIONNAIRE - PHQ9: SUM OF ALL RESPONSES TO PHQ QUESTIONS 1-9: 1

## 2018-11-13 ENCOUNTER — ALLIED HEALTH/NURSE VISIT (OUTPATIENT)
Dept: NURSING | Facility: CLINIC | Age: 58
End: 2018-11-13
Payer: COMMERCIAL

## 2018-11-13 VITALS — DIASTOLIC BLOOD PRESSURE: 77 MMHG | SYSTOLIC BLOOD PRESSURE: 132 MMHG

## 2018-11-13 DIAGNOSIS — Z23 NEED FOR PROPHYLACTIC VACCINATION AND INOCULATION AGAINST INFLUENZA: Primary | ICD-10-CM

## 2018-11-13 DIAGNOSIS — I10 BENIGN ESSENTIAL HYPERTENSION: ICD-10-CM

## 2018-11-13 PROCEDURE — G0008 ADMIN INFLUENZA VIRUS VAC: HCPCS

## 2018-11-13 PROCEDURE — 90686 IIV4 VACC NO PRSV 0.5 ML IM: CPT

## 2018-11-13 NOTE — MR AVS SNAPSHOT
After Visit Summary   11/13/2018    Ambrocio Delvalle    MRN: [id number]           Patient Information     Date Of Birth          1960        Visit Information        Provider Department      11/13/2018 1:15 PM CS NURSE [facility] Trang Johnson        Today's Diagnoses     Need for prophylactic vaccination and inoculation against influenza    -  1    Benign essential hypertension           Follow-ups after your visit        Who to contact     If you have questions or need follow up information about today's clinic visit or your schedule please contact [facility] directly at [phone].  Normal or non-critical lab and imaging results will be communicated to you by IRIhart, letter or phone within 4 business days after the clinic has received the results. If you do not hear from us within 7 days, please contact the clinic through Celles or phone. If you have a critical or abnormal lab result, we will notify you by phone as soon as possible.  Submit refill requests through [employer] or call your pharmacy and they will forward the refill request to us. Please allow 3 business days for your refill to be completed.          Additional Information About Your Visit        MyChart Information     [employer] gives you secure access to your electronic health record. If you see a primary care provider, you can also send messages to your care team and make appointments. If you have questions, please call your primary care clinic.  If you do not have a primary care provider, please call [phone] and they will assist you.        Care EveryWhere ID     This is your Care EveryWhere ID. This could be used by other organizations to access your [facility] medical records  [id number]         Blood Pressure from Last 3 Encounters:   11/13/18 132/77   09/06/18 139/83   04/09/18 155/86    Weight from Last 3 Encounters:   09/06/18 175 lb (79.4 kg)   04/09/18 175 lb (79.4 kg)   11/29/16 186 lb (84.4 kg)               We Performed the Following     FLU VACCINE, SPLIT VIRUS, IM (QUADRIVALENT) [87099]- >3 YRS     Vaccine Administration, Initial [47696]        Primary Care Provider Office Phone # Fax #    Ezekiel Quevedo [phone] [address] [address]        Equal Access to Services     [facility]: Hadii aad ku hadasho Soomaali, waaxda luqadaha, qaybta kaalmada adeegyada, waxay idiin hayaan adeeg khrichardjesusita latiffanyn . So [facility] [phone].    ATENCIÓN: Si habla español, tiene a narvaez disposición servicios gratuitos de asistencia lingüística. Brigitte al [phone].    We comply with applicable federal civil rights laws and [place] laws. We do not discriminate on the basis of race, color, national origin, age, disability, sex, sexual orientation, or gender identity.            Thank you!     Thank you for choosing [facility]  for your care. Our goal is always to provide you with excellent care. Hearing back from our patients is one way we can continue to improve our services. Please take a few minutes to complete the written survey that you may receive in the mail after your visit with us. Thank you!             Your Updated Medication List - Protect others around you: Learn how to safely use, store and throw away your medicines at www.disposemymeds.org.          This list is accurate as of 11/13/18  1:37 PM.  Always use your most recent med list.                   Brand Name Dispense Instructions for use Diagnosis    baclofen 20 MG tablet    LIORESAL    450 tablet    Take 1 tablet (20 mg) by mouth 5 times daily    Spasm       diazepam 5 MG tablet    VALIUM    60 tablet    TAKE ONE TABLET (5MG) BY MOUTH ONCE NIGHTLY AS NEEDED    Spasm       FLONASE 50 MCG/ACT spray   Generic drug:  fluticasone     16 g    Spray 1-2 sprays into both nostrils daily    Nasal congestion       ibuprofen 800 MG tablet    ADVIL/MOTRIN    90 tablet    Take 1 tablet by mouth every 8 hours as  needed.    HTN (hypertension)       LIPITOR 20 MG tablet   Generic drug:  atorvastatin     90 tablet    TAKE ONE TABLET (20 mg) BY MOUTH ONE TIME DAILY    Hyperlipidemia LDL goal <130       order for DME     1 Units    Equipment being ordered: Hospital Bed    Cerebral palsy (H), Reflex sympathetic dystrophy of lower extremity, bilateral       order for DME     1 Units    Equipment being ordered: Wheelchair    Cerebral palsy (H), Reflex sympathetic dystrophy of lower extremity, bilateral       order for DME     1 Units    Equipment being ordered: Wheelchair  Power wheelchair with tilt and recline function    Spastic quadriplegic cerebral palsy (H)       potassium chloride 8 MEQ CR tablet    KLOR-CON    180 tablet    Take 2 tablets (16 mEq) by mouth daily    Muscle spasm, Benign essential hypertension       sertraline 100 MG tablet    ZOLOFT    180 tablet    TAKE TWO TABLETS BY MOUTH DAILY -- due for office visit before further refills    Recurrent major depressive disorder, in partial remission (H)       triamterene-hydrochlorothiazide 37.5-25 MG per capsule    DYAZIDE    90 capsule    Take 1 capsule by mouth every morning    Benign essential hypertension       VITAMIN D PO      Take  by mouth.

## 2018-11-13 NOTE — PROGRESS NOTES

## 2018-11-21 DIAGNOSIS — R25.2 SPASM: ICD-10-CM

## 2018-11-21 NOTE — TELEPHONE ENCOUNTER
diazepam (VALIUM) 5 MG tablet  Last Written Prescription Date:  4/9/2018  Last Fill Quantity: 60,  # refills: 0   Last office visit: 9/6/2018 with prescribing provider:  Emy   Future Office Visit:  None    Routing refill request to provider for review/approval because:  Drug not on the [facility], P or [facility] refill protocol or controlled substance

## 2018-11-23 RX ORDER — DIAZEPAM 5 MG
TABLET ORAL
Qty: 60 TABLET | Refills: 0 | Status: SHIPPED | OUTPATIENT
Start: 2018-11-23 | End: 2019-07-07

## 2018-11-29 PROCEDURE — 82274 ASSAY TEST FOR BLOOD FECAL: CPT | Performed by: INTERNAL MEDICINE

## 2018-11-30 DIAGNOSIS — Z00.00 ROUTINE GENERAL MEDICAL EXAMINATION AT A HEALTH CARE FACILITY: ICD-10-CM

## 2018-12-02 LAB — HEMOCCULT STL QL IA: NEGATIVE

## 2018-12-03 NOTE — PROGRESS NOTES
Oumar,    Good news, no blood in the stool test.  Have a nice holiday season.    Ezekiel Quevedo M.D.

## 2019-05-13 DIAGNOSIS — E78.5 HYPERLIPIDEMIA LDL GOAL <130: ICD-10-CM

## 2019-05-13 NOTE — TELEPHONE ENCOUNTER
"     LIPITOR 20 MG tablet 90 tablet 3 4/9/2018       Last Written Prescription Date:  04/09/2018  Last Fill Quantity: 90,  # refills: 3   Last office visit: 9/6/2018 with prescribing provider:     Future Office Visit:  Unknown    Requested Prescriptions   Pending Prescriptions Disp Refills     LIPITOR 20 MG tablet [Pharmacy Med Name: Lipitor Oral Tablet 20 MG] 34 tablet 2     Sig: TAKE ONE TABLET (20 mg) BY MOUTH ONE TIME DAILY       Statins Protocol Failed - 5/13/2019  1:22 PM        Failed - LDL on file in past 12 months     Recent Labs   Lab Test 04/09/18  1320   *             Passed - No abnormal creatine kinase in past 12 months     No lab results found.             Passed - Recent (12 mo) or future (30 days) visit within the authorizing provider's specialty     Patient had office visit in the last 12 months or has a visit in the next 30 days with authorizing provider or within the authorizing provider's specialty.  See \"Patient Info\" tab in inbasket, or \"Choose Columns\" in Meds & Orders section of the refill encounter.              Passed - Medication is active on med list        Passed - Patient is age 18 or older          "

## 2019-05-15 RX ORDER — ATORVASTATIN CALCIUM 20 MG
TABLET ORAL
Qty: 34 TABLET | Refills: 0 | Status: SHIPPED | OUTPATIENT
Start: 2019-05-15 | End: 2019-06-20

## 2019-05-15 NOTE — TELEPHONE ENCOUNTER
Medication is being filled for 1 time refill only due to:  Patient needs to be seen because due for fasting exam.     Zeuss message sent to pt     Soha OBRIEN RN

## 2019-06-20 DIAGNOSIS — E78.5 HYPERLIPIDEMIA LDL GOAL <130: ICD-10-CM

## 2019-06-20 DIAGNOSIS — M62.838 MUSCLE SPASM: ICD-10-CM

## 2019-06-20 DIAGNOSIS — I10 BENIGN ESSENTIAL HYPERTENSION: ICD-10-CM

## 2019-06-20 RX ORDER — ATORVASTATIN CALCIUM 20 MG
20 TABLET ORAL DAILY
Qty: 30 TABLET | Refills: 0 | Status: SHIPPED | OUTPATIENT
Start: 2019-06-20 | End: 2019-07-16

## 2019-06-20 RX ORDER — POTASSIUM CHLORIDE 600 MG/1
TABLET, FILM COATED, EXTENDED RELEASE ORAL
Qty: 60 TABLET | Refills: 0 | Status: SHIPPED | OUTPATIENT
Start: 2019-06-20 | End: 2019-07-16

## 2019-06-20 NOTE — TELEPHONE ENCOUNTER
"LIPITOR 20 MG tablet  Last Written Prescription Date:  5/15/19  Last Fill Quantity: 34 tablet,  # refills: 0   Last office visit: 9/6/2018 with prescribing provider:  Emy   Future Office Visit:     potassium chloride ER (KLOR-CON) 8 MEQ CR tablet  Last Written Prescription Date:  4/09/18  Last Fill Quantity: 180 tablet,  # refills: 3   Last office visit: 9/6/2018 with prescribing provider:  Emy   Future Office Visit:   Next 5 appointments (look out 90 days)    Jul 16, 2019  1:00 PM CDT  PHYSICAL with Ezekiel Quevedo MD  [facility] [address] [facility] [address] [phone]           Requested Prescriptions   Pending Prescriptions Disp Refills     LIPITOR 20 MG tablet [Pharmacy Med Name: Lipitor Oral Tablet 20 MG] 34 tablet 0     Sig: Take 1 tablet (20 mg) by mouth once daily (Please schedule office visit to discuss further refills ([phone]))       Statins Protocol Failed - 6/20/2019 11:37 AM        Failed - LDL on file in past 12 months     Recent Labs   Lab Test 04/09/18  1320   *             Passed - No abnormal creatine kinase in past 12 months     No lab results found.             Passed - Recent (12 mo) or future (30 days) visit within the authorizing provider's specialty     Patient had office visit in the last 12 months or has a visit in the next 30 days with authorizing provider or within the authorizing provider's specialty.  See \"Patient Info\" tab in inbasket, or \"Choose Columns\" in Meds & Orders section of the refill encounter.              Passed - Medication is active on med list        Passed - Patient is age 18 or older        potassium chloride ER (KLOR-CON) 8 MEQ CR tablet [Pharmacy Med Name: Potassium Chloride ER Oral Tablet Extended Release 8 MEQ] 180 tablet 2     Sig: TAKE TWO TABLETS (16 meq) BY MOUTH DAILY       Potassium Supplements Protocol Failed - 6/20/2019 11:37 AM        Failed - Normal serum potassium in past 12 " "months     Recent Labs   Lab Test 04/09/18  1320   POTASSIUM 3.6                    Passed - Recent (12 mo) or future (30 days) visit within the authorizing provider's specialty     Patient had office visit in the last 12 months or has a visit in the next 30 days with authorizing provider or within the authorizing provider's specialty.  See \"Patient Info\" tab in inbasket, or \"Choose Columns\" in Meds & Orders section of the refill encounter.              Passed - Medication is active on med list        Passed - Patient is age 18 or older          "

## 2019-07-07 ENCOUNTER — TELEPHONE (OUTPATIENT)
Dept: FAMILY MEDICINE | Facility: CLINIC | Age: 59
End: 2019-07-07

## 2019-07-07 DIAGNOSIS — R25.2 SPASM: ICD-10-CM

## 2019-07-07 DIAGNOSIS — I10 BENIGN ESSENTIAL HYPERTENSION: ICD-10-CM

## 2019-07-08 NOTE — TELEPHONE ENCOUNTER
"triamterene-HCTZ (DYAZIDE) 37.5-25 MG capsule  Last Written Prescription Date:  4/09/18  Last Fill Quantity: 90 capsule,  # refills: 3   Last office visit: 9/6/2018 with prescribing provider:  Emy   Future Office Visit:     diazepam (VALIUM) 5 MG tablet      Last Written Prescription Date:  11/23/18  Last Fill Quantity: 60 tablet,   # refills: 0  Last Office Visit: 9/6/2018 (Emy)  Future Office visit:    Next 5 appointments (look out 90 days)    Jul 16, 2019  1:00 PM CDT  PHYSICAL with Ezekiel Quevedo MD  [facility] [address] [facility] [address] [phone]           Routing refill request to provider for review/approval because:  Drug not on the [facility], P or [facility] refill protocol or controlled substance      Requested Prescriptions   Pending Prescriptions Disp Refills     triamterene-HCTZ (DYAZIDE) 37.5-25 MG capsule [Pharmacy Med Name: Triamterene-HCTZ Oral Capsule 37.5-25 MG] 90 capsule 2     Sig: TAKE 1 CAPSULE BY MOUTH EVERY MORNING       Diuretics (Including Combos) Protocol Failed - 7/7/2019 12:28 PM        Failed - Normal serum creatinine on file in past 12 months     Recent Labs   Lab Test 04/09/18  1320   CR 0.83              Failed - Normal serum potassium on file in past 12 months     Recent Labs   Lab Test 04/09/18  1320   POTASSIUM 3.6                    Failed - Normal serum sodium on file in past 12 months     Recent Labs   Lab Test 04/09/18  1320                 Passed - Blood pressure under 140/90 in past 12 months     BP Readings from Last 3 Encounters:   11/13/18 132/77   09/06/18 139/83   04/09/18 155/86                 Passed - Recent (12 mo) or future (30 days) visit within the authorizing provider's specialty     Patient had office visit in the last 12 months or has a visit in the next 30 days with authorizing provider or within the authorizing provider's specialty.  See \"Patient Info\" tab in inbasket, or \"Choose " "Columns\" in Meds & Orders section of the refill encounter.              Passed - Medication is active on med list        Passed - Patient is age 18 or older        diazepam (VALIUM) 5 MG tablet [Pharmacy Med Name: diazePAM Oral Tablet 5 MG] 60 tablet 0     Sig: TAKE 1 TABLET BY MOUTH AT BEDTIME as needed       There is no refill protocol information for this order          "

## 2019-07-09 NOTE — TELEPHONE ENCOUNTER
Routing refill request to provider for review/approval because:  Drug not on the [facility] refill protocol (Diazepam-  Checked- no concerns)   Labs out of range: Cr, K+, NA (pt has upcoming OV)     Please review and authorize if appropriate,     Thank you,   Jeana JOHNSON RN

## 2019-07-10 RX ORDER — TRIAMTERENE AND HYDROCHLOROTHIAZIDE 37.5; 25 MG/1; MG/1
1 CAPSULE ORAL EVERY MORNING
Qty: 90 CAPSULE | Refills: 0 | Status: SHIPPED | OUTPATIENT
Start: 2019-07-10 | End: 2019-07-16

## 2019-07-10 RX ORDER — DIAZEPAM 5 MG
TABLET ORAL
Qty: 60 TABLET | Refills: 0 | Status: SHIPPED | OUTPATIENT
Start: 2019-07-10 | End: 2020-03-14

## 2019-07-10 NOTE — TELEPHONE ENCOUNTER
[facility] called requesting an update on the diazepam (VALIUM) 5 MG tablet. Has not received anything either    [employer] ph: [phone] option 0

## 2019-07-16 ENCOUNTER — OFFICE VISIT (OUTPATIENT)
Dept: FAMILY MEDICINE | Facility: CLINIC | Age: 59
End: 2019-07-16
Payer: MEDICARE

## 2019-07-16 VITALS
SYSTOLIC BLOOD PRESSURE: 137 MMHG | TEMPERATURE: 98.7 F | BODY MASS INDEX: 30.04 KG/M2 | DIASTOLIC BLOOD PRESSURE: 89 MMHG | WEIGHT: 175 LBS | OXYGEN SATURATION: 95 % | HEART RATE: 85 BPM

## 2019-07-16 DIAGNOSIS — R25.2 SPASM: ICD-10-CM

## 2019-07-16 DIAGNOSIS — F33.41 RECURRENT MAJOR DEPRESSIVE DISORDER, IN PARTIAL REMISSION (H): ICD-10-CM

## 2019-07-16 DIAGNOSIS — E55.9 VITAMIN D DEFICIENCY: ICD-10-CM

## 2019-07-16 DIAGNOSIS — E78.6 LOW HDL (UNDER 40): ICD-10-CM

## 2019-07-16 DIAGNOSIS — E78.5 HYPERLIPIDEMIA LDL GOAL <130: ICD-10-CM

## 2019-07-16 DIAGNOSIS — M62.838 MUSCLE SPASM: ICD-10-CM

## 2019-07-16 DIAGNOSIS — R06.83 SNORING: ICD-10-CM

## 2019-07-16 DIAGNOSIS — R09.81 NASAL CONGESTION: ICD-10-CM

## 2019-07-16 DIAGNOSIS — M81.0 OSTEOPOROSIS, UNSPECIFIED OSTEOPOROSIS TYPE, UNSPECIFIED PATHOLOGICAL FRACTURE PRESENCE: ICD-10-CM

## 2019-07-16 DIAGNOSIS — I10 BENIGN ESSENTIAL HYPERTENSION: ICD-10-CM

## 2019-07-16 DIAGNOSIS — Z00.00 ROUTINE GENERAL MEDICAL EXAMINATION AT A HEALTH CARE FACILITY: Primary | ICD-10-CM

## 2019-07-16 LAB
ERYTHROCYTE [DISTWIDTH] IN BLOOD BY AUTOMATED COUNT: 14 % (ref 10–15)
HCT VFR BLD AUTO: 46.7 % (ref 40–53)
HGB BLD-MCNC: 15.7 G/DL (ref 13.3–17.7)
MCH RBC QN AUTO: 29.4 PG (ref 26.5–33)
MCHC RBC AUTO-ENTMCNC: 33.6 G/DL (ref 31.5–36.5)
MCV RBC AUTO: 88 FL (ref 78–100)
PLATELET # BLD AUTO: 297 10E9/L (ref 150–450)
RBC # BLD AUTO: 5.34 10E12/L (ref 4.4–5.9)
WBC # BLD AUTO: 10.3 10E9/L (ref 4–11)

## 2019-07-16 PROCEDURE — 84443 ASSAY THYROID STIM HORMONE: CPT | Performed by: INTERNAL MEDICINE

## 2019-07-16 PROCEDURE — 36415 COLL VENOUS BLD VENIPUNCTURE: CPT | Performed by: INTERNAL MEDICINE

## 2019-07-16 PROCEDURE — 80061 LIPID PANEL: CPT | Performed by: INTERNAL MEDICINE

## 2019-07-16 PROCEDURE — 85027 COMPLETE CBC AUTOMATED: CPT | Performed by: INTERNAL MEDICINE

## 2019-07-16 PROCEDURE — 80053 COMPREHEN METABOLIC PANEL: CPT | Performed by: INTERNAL MEDICINE

## 2019-07-16 PROCEDURE — 82306 VITAMIN D 25 HYDROXY: CPT | Performed by: INTERNAL MEDICINE

## 2019-07-16 PROCEDURE — 99396 PREV VISIT EST AGE 40-64: CPT | Performed by: INTERNAL MEDICINE

## 2019-07-16 RX ORDER — ATORVASTATIN CALCIUM 20 MG
20 TABLET ORAL DAILY
Qty: 90 TABLET | Refills: 3 | Status: SHIPPED | OUTPATIENT
Start: 2019-07-16 | End: 2019-07-17

## 2019-07-16 RX ORDER — TRIAMTERENE AND HYDROCHLOROTHIAZIDE 37.5; 25 MG/1; MG/1
1 CAPSULE ORAL EVERY MORNING
Qty: 90 CAPSULE | Refills: 3 | Status: SHIPPED | OUTPATIENT
Start: 2019-07-16 | End: 2019-07-17

## 2019-07-16 RX ORDER — POTASSIUM CHLORIDE 600 MG/1
16 TABLET, FILM COATED, EXTENDED RELEASE ORAL DAILY
Qty: 180 TABLET | Refills: 3 | Status: SHIPPED | OUTPATIENT
Start: 2019-07-16 | End: 2019-07-17

## 2019-07-16 RX ORDER — BACLOFEN 20 MG/1
20 TABLET ORAL
Qty: 450 TABLET | Refills: 3 | Status: SHIPPED | OUTPATIENT
Start: 2019-07-16 | End: 2019-07-17

## 2019-07-16 RX ORDER — SERTRALINE HYDROCHLORIDE 100 MG/1
TABLET, FILM COATED ORAL
Qty: 180 TABLET | Refills: 3 | Status: SHIPPED | OUTPATIENT
Start: 2019-07-16 | End: 2019-07-17

## 2019-07-16 ASSESSMENT — ACTIVITIES OF DAILY LIVING (ADL)
CURRENT_FUNCTION: TRANSPORTATION REQUIRES ASSISTANCE
CURRENT_FUNCTION: MONEY MANAGEMENT REQUIRES ASSISTANCE
CURRENT_FUNCTION: MEDICATION ADMINISTRATION REQUIRES ASSISTANCE
CURRENT_FUNCTION: SHOPPING REQUIRES ASSISTANCE
CURRENT_FUNCTION: TELEPHONE REQUIRES ASSISTANCE
CURRENT_FUNCTION: LAUNDRY REQUIRES ASSISTANCE
CURRENT_FUNCTION: PREPARING MEALS REQUIRES ASSISTANCE
CURRENT_FUNCTION: HOUSEWORK REQUIRES ASSISTANCE
CURRENT_FUNCTION: BATHING REQUIRES ASSISTANCE

## 2019-07-16 NOTE — PROGRESS NOTES
"SUBJECTIVE:   CC: Ambrocio Delvalle is an 59 year old male who presents for preventative health visit.     HPI  {Add if <65 person on Medicare  - Required Questions (Optional):609971}  {Outside tests to abstract? :983158}    {additional problems to add (Optional):525976}    Today's PHQ-2 Score:   PHQ-2 ( 1999 [employer]) 8/21/2014   Q1: Little interest or pleasure in doing things 0   Q2: Feeling down, depressed or hopeless 0   PHQ-2 Score 0       Abuse: Current or Past(Physical, Sexual or Emotional)- { :219395}  Do you feel safe in your environment? { :999238}    Social History     Tobacco Use     Smoking status: Never Smoker     Smokeless tobacco: Never Used   Substance Use Topics     Alcohol use: No     Alcohol/week: 0.0 oz     {Rooming Staff- Complete this question if Prescreen response is not shown below for today's visit. If you drink alcohol do you typically have >3 drinks per day or >7 drinks per week? (Optional):677577}    Alcohol Use 11/29/2016   Prescreen: >3 drinks/day or >7 drinks/week? The patient does not drink >3 drinks per day nor >7 drinks per week.   {add AUDIT responses (Optional) (A score of 7 for adult men is an indication of hazardous drinking; a score of 8 or more is an indication of an alcohol use disorder.  A score of 7 or more for adult women is an indication of hazardous drinking or an alchohol use disorder):906252}    Last PSA:   PSA   Date Value Ref Range Status   04/09/2018 1.00 0 - 4 ug/L Final     Comment:     Assay Method:  Chemiluminescence using Siemens Vista analyzer       Reviewed orders with patient. Reviewed health maintenance and updated orders accordingly - { :572257::\"Yes\"}  {Chronicprobdata (optional):008932}    Reviewed and updated as needed this visit by clinical staff         Reviewed and updated as needed this visit by Provider        {HISTORY OPTIONS (Optional):924101}    Review of Systems  {MALE ROS (Optional):410927::\"CONSTITUTIONAL: NEGATIVE for fever, chills, change " "in weight\",\"INTEGUMENTARY/SKIN: NEGATIVE for worrisome rashes, moles or lesions\",\"EYES: NEGATIVE for vision changes or irritation\",\"ENT: NEGATIVE for ear, mouth and throat problems\",\"RESP: NEGATIVE for significant cough or SOB\",\"CV: NEGATIVE for chest pain, palpitations or peripheral edema\",\"GI: NEGATIVE for nausea, abdominal pain, heartburn, or change in bowel habits\",\" male: negative for dysuria, hematuria, decreased urinary stream, erectile dysfunction, urethral discharge\",\"MUSCULOSKELETAL: NEGATIVE for significant arthralgias or myalgia\",\"NEURO: NEGATIVE for weakness, dizziness or paresthesias\",\"PSYCHIATRIC: NEGATIVE for changes in mood or affect\"}    OBJECTIVE:   There were no vitals taken for this visit.    Physical Exam  {Exam Choices (Optional):770137}    {Diagnostic Test Results (Optional):726659::\"Diagnostic Test Results:\",\"Labs reviewed in Epic\"}    ASSESSMENT/PLAN:   {Diag Picklist:951370}    COUNSELING:   {MALE COUNSELING MESSAGES:542796::\"Reviewed preventive health counseling, as reflected in patient instructions\"}    Estimated body mass index is 30.04 kg/m  as calculated from the following:    Height as of 9/6/18: 1.626 m (5' 4\").    Weight as of 9/6/18: 79.4 kg (175 lb).     {Weight Management Plan (ACO) Complete if BMI is abnormal-  Ages 18-64  BMI >24.9.  Age 65+ with BMI <23 or >30 (Optional):580783}     reports that he has never smoked. He has never used smokeless tobacco.  {Tobacco Cessation -- Complete if patient is a smoker (Optional):445845}    Counseling Resources:  ATP IV Guidelines  Pooled Cohorts Equation Calculator  FRAX Risk Assessment  ICSI Preventive Guidelines  Dietary Guidelines for Americans, 2010  USDA's MyPlate  ASA Prophylaxis  Lung CA Screening    Ezekiel Quevedo MD  [facility]  "

## 2019-07-16 NOTE — PROGRESS NOTES
SUBJECTIVE:   Ambrocio Delvalle is a 59 year old male who presents for Preventive Visit.    The patient overall is doing well.  He got a new power wheelchair this year and that has helped significantly.  He has not seen endocrine yet but plans to follow-up with Dr. Gale soon for his osteoporosis.  He is also going to the sleep clinic to be evaluated there.    Patient has had nasal congestion for a long time despite Flonase.    The patient it sounds like needs a new mattress to prevent bedsores.  He is working with insurance on that.    He uses Valium occasionally.  He is done this for years without signs of abuse.    His depression is under fairly good control.  He does have excess sweating which is had for quite some time.  He has no sores in his chest frequently for that.  He otherwise feels fine.  Of note it was a limited exam due to his body habitus and his being in the wheelchair.    Past Medical History:   Diagnosis Date     Cerebral palsy (H)      Depression, major, in partial remission (H)      High cholesterol      Hypertension      Low HDL (under 40)      Lung nodule 2002    fu benign     Microhematuria 2007    ct neg cysto inflammation     Nasal congestion      Nephrolithiasis 1986    urol eval Dr. Beverly     Osteoporosis 2006, fu 2010    dexa 11/10 -1.5 fem neck and -2.9 total hip; fu 2016 and worse, -4.0 hip     Pharyngitis 2002    hosp fsd     Pulmonary HTN (H) 2008    seen on echo, fu 12/16 not seen, nl ef, grade 1 dd     Reflex sympathetic dystrophy of lower extremity      Right ventricular hypertrophy 2008 on echo    ?due to sandie, had fu with Dr. Gaitan, never did sleep study, fu echo 12/16 no rvh seen     Sweats, sweating, excessive 2008    echo with rvh and pulm htn, ct chest, abd and pelvis with liver cysts and old lung nodule, cosyntropin stim test neg     Urethral stricture 2007    had cysto and ct done for hematuria as well     Vitamin D deficiency      Past Surgical History:   Procedure  Laterality Date     GENITOURINARY SURGERY      lithortipsy,  ureteral widened     ORTHOPEDIC SURGERY      left knee- abductors lengthed heel cords lengthened, tendons trransferred , patella lowered,     Social History     Socioeconomic History     Marital status: Single     Spouse name: Not on file     Number of children: o     Years of education: Not on file     Highest education level: Not on file   Occupational History     Employer: DISABLED   Social Needs     Financial resource strain: Not on file     Food insecurity:     Worry: Not on file     Inability: Not on file     Transportation needs:     Medical: Not on file     Non-medical: Not on file   Tobacco Use     Smoking status: Never Smoker     Smokeless tobacco: Never Used   Substance and Sexual Activity     Alcohol use: No     Alcohol/week: 0.0 oz     Drug use: No     Sexual activity: Not Currently   Lifestyle     Physical activity:     Days per week: Not on file     Minutes per session: Not on file     Stress: Not on file   Relationships     Social connections:     Talks on phone: Not on file     Gets together: Not on file     Attends Temple service: Not on file     Active member of club or organization: Not on file     Attends meetings of clubs or organizations: Not on file     Relationship status: Not on file     Intimate partner violence:     Fear of current or ex partner: Not on file     Emotionally abused: Not on file     Physically abused: Not on file     Forced sexual activity: Not on file   Other Topics Concern     Not on file   Social History Narrative     Not on file     Current Outpatient Medications   Medication Sig Dispense Refill     baclofen (LIORESAL) 20 MG tablet Take 1 tablet (20 mg) by mouth 5 times daily 450 tablet 3     Cholecalciferol (VITAMIN D PO) Take  by mouth.       diazepam (VALIUM) 5 MG tablet TAKE 1 TABLET BY MOUTH AT BEDTIME as needed 60 tablet 0     FLONASE 50 MCG/ACT nasal spray Spray 1-2 sprays into both nostrils daily 16  g 5     ibuprofen (ADVIL,MOTRIN) 800 MG tablet Take 1 tablet by mouth every 8 hours as needed. 90 tablet 3     LIPITOR 20 MG tablet Take 1 tablet (20 mg) by mouth daily 90 tablet 3     order for DME Equipment being ordered: Wheelchair    Power wheelchair with tilt and recline function 1 Units 0     ORDER FOR DME Equipment being ordered: Hospital Bed 1 Units 0     ORDER FOR DME Equipment being ordered: Wheelchair 1 Units 0     potassium chloride ER (KLOR-CON) 8 MEQ CR tablet Take 2 tablets (16 mEq) by mouth daily 180 tablet 3     sertraline (ZOLOFT) 100 MG tablet TAKE TWO TABLETS BY MOUTH DAILY -- due for office visit before further refills 180 tablet 3     triamterene-HCTZ (DYAZIDE) 37.5-25 MG capsule Take 1 capsule by mouth every morning 90 capsule 3     Allergies   Allergen Reactions     Penicillins Hives     Atorvastatin      Muscle aches to generic     Simvastatin      Muscle aches     FAMILY HISTORY NOTED AND REVIEWED    REVIEW OF SYSTEMS: above    PHYSICAL EXAM    /89 (BP Location: Right arm, Cuff Size: Adult Regular)   Pulse 85   Temp 98.7  F (37.1  C) (Tympanic)   Wt 79.4 kg (175 lb)   SpO2 95%   BMI 30.04 kg/m      Patient appears non toxic  Mouth and eyes within normal limits  tms within normal limits  Lungs clear  cv reglar rate and rhythm, no murmer, rub or gallop, no jvp or edema  Abdomen non-tender    ASSESSMENT:  1. Normal complete physical exam  2. C.p. Stable  3. Depression, doing ok  4. Hypertension, controlled  5. Elevated cholesterol, on statin  6. Muscle spasms, using med, no signs abuve  7. Nasal mervin, follow up ent  8. Osteopor, follow up endo  9. Snoring, follow up sleep  10. Vit d defic, on it  11. Health care maintenance    PLAN:  Letter with labs  Fit testing  Up to date immunizations  Stay active  Call if problems          Are you in the first 12 months of your Medicare coverage?  No    Healthy Habits:    In general, how would you rate your overall health?  Very good     "Frequency of exercise:  None    Duration of exercise:  Less than 15 minutes    Do you usually eat at least 4 servings of fruit and vegetables a day, include whole grains    & fiber and avoid regularly eating high fat or \"junk\" foods?  Yes    Taking medications regularly:  Yes    Barriers to taking medications:  None    Medication side effects:  None    Ability to successfully perform activities of daily living:  Housework requires assistance, bathing requires assistance, laundry requires assistance, medication administration requires assistance, money management requires assistance, preparing meals requires assistance, shopping requires assistance, transportation requires assistance and telephone requires assistance    Home Safety:  No safety concerns identified    Hearing Impairment:  No hearing concerns    In the past 6 months, have you been bothered by leaking of urine?  No    In general, how would you rate your overall mental or emotional health?  Good      PHQ-2 Total Score:    Additional concerns today:  No    Do you feel safe in your environment? Yes    Do you have a Health Care Directive? No: Advance care planning reviewed with patient; information given to patient to review.      Fall risk       Cognitive Screening -- Patient unable to complete    Do you have sleep apnea, excessive snoring or daytime drowsiness?: yes    Reviewed and updated as needed this visit by clinical staff         Reviewed and updated as needed this visit by Provider        Social History     Tobacco Use     Smoking status: Never Smoker     Smokeless tobacco: Never Used   Substance Use Topics     Alcohol use: No     Alcohol/week: 0.0 oz     If you drink alcohol do you typically have >3 drinks per day or >7 drinks per week? No    Alcohol Use 11/29/2016   Prescreen: >3 drinks/day or >7 drinks/week? The patient does not drink >3 drinks per day nor >7 drinks per week.               Current providers sharing in care for this patient " "include: sleep, endo  Patient Care Team:  Ezekiel Quevedo MD as PCP - General (Internal Medicine)  Ezekiel Quevedo MD as Assigned PCP    The following health maintenance items are reviewed in Epic and correct as of today:  Health Maintenance   Topic Date Due     LYNSEY ASSESSMENT  1960     ADVANCE CARE PLANNING  1960     COLONOSCOPY  06/06/1970     HIV SCREENING  06/06/1975     PHQ-9  03/06/2019     INFLUENZA VACCINE (1) 09/01/2019     LIPID  04/09/2023     MEDICARE ANNUAL WELLNESS VISIT  06/06/2025     DTAP/TDAP/TD IMMUNIZATION (3 - Td) 11/29/2026     HEPATITIS C SCREENING  Completed     DEPRESSION ACTION PLAN  Completed     ZOSTER IMMUNIZATION  Completed     IPV IMMUNIZATION  Aged Out     MENINGITIS IMMUNIZATION  Aged Out           Review of Systems      OBJECTIVE:   There were no vitals taken for this visit. Estimated body mass index is 30.04 kg/m  as calculated from the following:    Height as of 9/6/18: 1.626 m (5' 4\").    Weight as of 9/6/18: 79.4 kg (175 lb).  Physical Exam          ASSESSMENT / PLAN:       End of Life Planning:  Patient currently has an advanced directive: No.  I have verified the patient's ablity to prepare an advanced directive/make health care decisions.  Literature was provided to assist patient in preparing an advanced directive.    COUNSELING:  Reviewed preventive health counseling, as reflected in patient instructions       Regular exercise       Healthy diet/nutrition  Exercise if able to  Estimated body mass index is 30.04 kg/m  as calculated from the following:    Height as of 9/6/18: 1.626 m (5' 4\").    Weight as of 9/6/18: 79.4 kg (175 lb).         reports that he has never smoked. He has never used smokeless tobacco.      Appropriate preventive services were discussed with this patient, including applicable screening as appropriate for cardiovascular disease, diabetes, osteopenia/osteoporosis, and glaucoma.  As appropriate for age/gender, discussed screening " for colorectal cancer, prostate cancer, breast cancer, and cervical cancer. Checklist reviewing preventive services available has been given to the patient.    Reviewed patients plan of care and provided an AVS. The Basic Care Plan (routine screening as documented in Health Maintenance) for Ambrocio meets the Care Plan requirement. This Care Plan has been established and reviewed with the Patient and mother.    Counseling Resources:  ATP IV Guidelines  Pooled Cohorts Equation Calculator  Breast Cancer Risk Calculator  FRAX Risk Assessment  ICSI Preventive Guidelines  Dietary Guidelines for Americans, 2010  USDA's MyPlate  ASA Prophylaxis  Lung CA Screening    Ezekiel Quevedo MD  [facility]    Identified Health Risks:

## 2019-07-17 LAB
ALBUMIN SERPL-MCNC: 3.9 G/DL (ref 3.4–5)
ALP SERPL-CCNC: 126 U/L (ref 40–150)
ALT SERPL W P-5'-P-CCNC: 31 U/L (ref 0–70)
ANION GAP SERPL CALCULATED.3IONS-SCNC: 9 MMOL/L (ref 3–14)
AST SERPL W P-5'-P-CCNC: 20 U/L (ref 0–45)
BILIRUB SERPL-MCNC: 0.4 MG/DL (ref 0.2–1.3)
BUN SERPL-MCNC: 11 MG/DL (ref 7–30)
CALCIUM SERPL-MCNC: 8.8 MG/DL (ref 8.5–10.1)
CHLORIDE SERPL-SCNC: 102 MMOL/L (ref 94–109)
CHOLEST SERPL-MCNC: 187 MG/DL
CO2 SERPL-SCNC: 30 MMOL/L (ref 20–32)
CREAT SERPL-MCNC: 0.86 MG/DL (ref 0.66–1.25)
DEPRECATED CALCIDIOL+CALCIFEROL SERPL-MC: 70 UG/L (ref 20–75)
GFR SERPL CREATININE-BSD FRML MDRD: >90 ML/MIN/{1.73_M2}
GLUCOSE SERPL-MCNC: 96 MG/DL (ref 70–99)
HDLC SERPL-MCNC: 39 MG/DL
LDLC SERPL CALC-MCNC: 108 MG/DL
NONHDLC SERPL-MCNC: 148 MG/DL
POTASSIUM SERPL-SCNC: 3.6 MMOL/L (ref 3.4–5.3)
PROT SERPL-MCNC: 7.6 G/DL (ref 6.8–8.8)
SODIUM SERPL-SCNC: 141 MMOL/L (ref 133–144)
TRIGL SERPL-MCNC: 198 MG/DL
TSH SERPL DL<=0.005 MIU/L-ACNC: 1.55 MU/L (ref 0.4–4)

## 2019-07-17 NOTE — RESULT ENCOUNTER NOTE
It was a pleasure seeing you for your physical examination.  I wanted to get back to you with your test results.  I have enclosed a copy for your review.      I am happy to report that your cbc or complete blood count is normal with no signs of anemia, leukemia or platelet abnormalities.  Your chemistry panel shows no signs of diabetes.  Your blood salts, kidney tests, liver tests, vitamin D level, thyroid test, and proteins are all fine.    Your total cholesterol is 187 with the normal range being below 200.  Your HDL or good cholesterol is 39 with the normal range being above 40.  Your LDL or bad cholesterol is 108 with the normal range being below 130.  Overall these numbers are fine.    I am happy to bring you this overall excellent report.  If you have any questions please call me.    Ezekiel Quevedo M.D.

## 2019-09-05 ENCOUNTER — TRANSFERRED RECORDS (OUTPATIENT)
Dept: HEALTH INFORMATION MANAGEMENT | Facility: CLINIC | Age: 59
End: 2019-09-05

## 2019-10-04 ENCOUNTER — OFFICE VISIT (OUTPATIENT)
Dept: ENDOCRINOLOGY | Facility: CLINIC | Age: 59
End: 2019-10-04
Payer: MEDICARE

## 2019-10-04 VITALS
DIASTOLIC BLOOD PRESSURE: 93 MMHG | WEIGHT: 180 LBS | HEART RATE: 78 BPM | BODY MASS INDEX: 30.9 KG/M2 | SYSTOLIC BLOOD PRESSURE: 145 MMHG

## 2019-10-04 DIAGNOSIS — G80.9 CEREBRAL PALSY, UNSPECIFIED TYPE (H): ICD-10-CM

## 2019-10-04 DIAGNOSIS — M81.0 OSTEOPOROSIS WITHOUT CURRENT PATHOLOGICAL FRACTURE, UNSPECIFIED OSTEOPOROSIS TYPE: Primary | ICD-10-CM

## 2019-10-04 LAB
ANION GAP SERPL CALCULATED.3IONS-SCNC: 8 MMOL/L (ref 3–14)
BUN SERPL-MCNC: 12 MG/DL (ref 7–30)
CALCIUM SERPL-MCNC: 9.1 MG/DL (ref 8.5–10.1)
CHLORIDE SERPL-SCNC: 105 MMOL/L (ref 94–109)
CO2 SERPL-SCNC: 27 MMOL/L (ref 20–32)
CREAT SERPL-MCNC: 0.79 MG/DL (ref 0.66–1.25)
GFR SERPL CREATININE-BSD FRML MDRD: >90 ML/MIN/{1.73_M2}
GLUCOSE SERPL-MCNC: 91 MG/DL (ref 70–99)
POTASSIUM SERPL-SCNC: 3.7 MMOL/L (ref 3.4–5.3)
PTH-INTACT SERPL-MCNC: 60 PG/ML (ref 18–80)
SODIUM SERPL-SCNC: 140 MMOL/L (ref 133–144)

## 2019-10-04 PROCEDURE — 80048 BASIC METABOLIC PNL TOTAL CA: CPT | Performed by: INTERNAL MEDICINE

## 2019-10-04 PROCEDURE — 36415 COLL VENOUS BLD VENIPUNCTURE: CPT | Performed by: INTERNAL MEDICINE

## 2019-10-04 PROCEDURE — 84165 PROTEIN E-PHORESIS SERUM: CPT | Performed by: INTERNAL MEDICINE

## 2019-10-04 PROCEDURE — 00000402 ZZHCL STATISTIC TOTAL PROTEIN: Performed by: INTERNAL MEDICINE

## 2019-10-04 PROCEDURE — 99205 OFFICE O/P NEW HI 60 MIN: CPT | Performed by: INTERNAL MEDICINE

## 2019-10-04 PROCEDURE — 83970 ASSAY OF PARATHORMONE: CPT | Performed by: INTERNAL MEDICINE

## 2019-10-04 PROCEDURE — 84403 ASSAY OF TOTAL TESTOSTERONE: CPT | Performed by: INTERNAL MEDICINE

## 2019-10-04 RX ORDER — CETIRIZINE HYDROCHLORIDE 10 MG/1
10 TABLET ORAL DAILY
COMMUNITY

## 2019-10-04 NOTE — PROGRESS NOTES
Name: Ambrocio Delvalle is a 59 year old man, seen at the request of Dr. Ezekiel Quevedo for evaluation of    Chief Complaint   Patient presents with     Osteoporosis     HPI:  Recent issues:  Here for evaluation of osteoporosis, with his mother Ivette  Reviewed medical history from patient and Epic chart record        History of cerebral palsy, diagnosed during early childhood  Physical limitations with leg weakness, extremity ROM, leg muscle spasms/rigidity  Has used wheelchair (WC) for many years... motorized WC approx 30 yrs  Meals TID, able to feed self with utensils  Transfer assistance to commode and bed with assistance, also uses [employer] Lift device    Previous medical evaluations with Dr. IVORY Quevedo  Previous DEXA scans include:  11/3/10 DEXA:   L1-4   T-score: -0.2   Left fem neck:  T-score: -1.5   Left total hip:  T-score: -2.9    12/19/16 DEXA:   L1-4    T-score: 0.7    Lumbar scoliosis is noted.    Left fem neck:  T-score: -4.0   Left total hip:  T-score: -3.9   Right fem neck: T-score: -2.6   Right total hip:  T-score: -2.8  Previous [facility] labs include:     Lab Test 07/16/19  1310 04/09/18  1320 11/23/16  1153 09/11/15  1122 04/25/13  1510 04/18/13  1057   ROBINA 8.8 9.1 9.3 9.2  --  9.0   VITDT 70 57 77* 46 57  --      Health history includes:  Bone fractures:  Left tibia fracture  Vit D deficiency:  Yes   Kidney stones:  Yes  Supplements:   None  Osteoporosis meds:  Records from [facility] indicate alendronate use ~2010      Patient and his mother don't recall this med use, however  Fam Hx osteoporosis:  None known    Recent [facility] labs include:  Lab Results   Component Value Date     07/16/2019    POTASSIUM 3.6 07/16/2019    CHLORIDE 102 07/16/2019    CO2 30 07/16/2019    ANIONGAP 9 07/16/2019    GLC 96 07/16/2019    BUN 11 07/16/2019    CR 0.86 07/16/2019    GFRESTIMATED >90 07/16/2019    GFRESTBLACK >90 07/16/2019    ROBINA 8.8 07/16/2019    TSH 1.55 07/16/2019    VITDT 70 07/16/2019       Recent  symptoms:   Fatigue   Leg weakness and muscle spasms  Other chronic illnesses include:   Hypertension:   Takes Triam/HCTZ med, followed by PCP   Depression:    Takes Zoloft med, followed by PCP   Hyperlipidemia: Takes atorvastatin med, followed by PCP      Single, lives with his mother Ivette in [place]  Sees Dr. Ezekiel Quevedo/[facility] for general medicine evaluations.    PMH/PSH:  Past Medical History:   Diagnosis Date     Cerebral palsy (H)      Depression, major, in partial remission (H)      High cholesterol      Hypertension      Low HDL (under 40)      Lung nodule 2002    fu benign     Microhematuria 2007    ct neg cysto inflammation     Nasal congestion      Nephrolithiasis 1986    urol eval Dr. Beverly     Osteoporosis 2006, fu 2010    dexa 11/10 -1.5 fem neck and -2.9 total hip; fu 2016 and worse, -4.0 hip     Pharyngitis 2002    hosp fsd     Pulmonary HTN (H) 2008    seen on echo, fu 12/16 not seen, nl ef, grade 1 dd     Reflex sympathetic dystrophy of lower extremity      Right ventricular hypertrophy 2008 on echo    ?due to sandie, had fu with Dr. Gaitan, never did sleep study, fu echo 12/16 no rvh seen     Sweats, sweating, excessive 2008    echo with rvh and pulm htn, ct chest, abd and pelvis with liver cysts and old lung nodule, cosyntropin stim test neg     Urethral stricture 2007    had cysto and ct done for hematuria as well     Vitamin D deficiency      Past Surgical History:   Procedure Laterality Date     GENITOURINARY SURGERY      lithortipsy,  ureteral widened     ORTHOPEDIC SURGERY      left knee- abductors lengthed heel cords lengthened, tendons trransferred , patella lowered,       Family Hx:  Family History   Problem Relation Age of Onset     Prostate Cancer Father          Social Hx:  Social History     Socioeconomic History     Marital status: Single     Spouse name: Not on file     Number of children: o     Years of education: Not on file     Highest education level: Not on file    Occupational History     Employer: DISABLED   Social Needs     Financial resource strain: Not on file     Food insecurity:     Worry: Not on file     Inability: Not on file     Transportation needs:     Medical: Not on file     Non-medical: Not on file   Tobacco Use     Smoking status: Never Smoker     Smokeless tobacco: Never Used   Substance and Sexual Activity     Alcohol use: No     Alcohol/week: 0.0 standard drinks     Drug use: No     Sexual activity: Not Currently   Lifestyle     Physical activity:     Days per week: Not on file     Minutes per session: Not on file     Stress: Not on file   Relationships     Social connections:     Talks on phone: Not on file     Gets together: Not on file     Attends Zoroastrian service: Not on file     Active member of club or organization: Not on file     Attends meetings of clubs or organizations: Not on file     Relationship status: Not on file     Intimate partner violence:     Fear of current or ex partner: Not on file     Emotionally abused: Not on file     Physically abused: Not on file     Forced sexual activity: Not on file   Other Topics Concern     Not on file   Social History Narrative     Not on file          MEDICATIONS:  has a current medication list which includes the following prescription(s): baclofen, cetirizine, cholecalciferol, diazepam, flonase, ibuprofen, lipitor, potassium chloride er, sertraline, triamterene-hctz, order for dme, order for dme, and order for dme.    ROS:     ROS: 10 point ROS neg other than the symptoms noted above in the HPI.    GENERAL: fatigue, wt stable?; denies fevers, chills, night sweats.    HEENT: no dysphagia, odonophagia, diplopia, neck pain  THYROID:  no apparent hyper or hypothyroid symptoms  CV: no chest pain, pressure, palpitations  LUNGS: no SOB, DUKE, cough, wheezing   ABDOMEN: no diarrhea, constipation, abdominal pain  EXTREMITIES: no rashes, ulcers, edema  NEUROLOGY: no headaches, denies changes in vision,  tingling  MSK:  Severe leg muscle weakness, spasms with movement, limited shoulder/upper arm ROM above horizontal  SKIN: no rashes or lesions  : reports having urine and bowel control  PSYCH:  stable mood, no significant anxiety or depression  ENDOCRINE: no heat or cold intolerance    Physical Exam   VS: BP (!) 145/93   Pulse 78   Wt 81.6 kg (180 lb)   BMI 30.90 kg/m    GENERAL: AXOX3, NAD, eyeglasses, seated in WC, well dressed, answering questions appropriately, appears stated age.  THYROID:  normal gland, no apparent nodules or goiter  HEENT: lateral deviation left eye, tracking with right eye, neck non-tender, no exopthalmous, no proptosis  CV: soft heart sounds, RRR, no rubs, gallops, no murmurs  LUNGS: CTAB, no wheezes, rales, or ronchi  ABDOMEN: mildly obese, soft, nontender, nondistended  EXTREMITIES: leg muscle atrophy bilaterally, small feet in support shoes, mild ankle edema, feet not examined  NEUROLOGY: CN grossly intact, spasms at thighs with leg movement, no tremors  MSK: grossly intact  SKIN: facial beard, no rashes, no lesions    LABS:    All pertinent notes, labs, and images personally reviewed by me.     A/P:  Encounter Diagnoses   Name Primary?     Osteoporosis without current pathological fracture, unspecified osteoporosis type Yes     Cerebral palsy, unspecified type (H)        Comments:  Reviewed complicated health history and osteoporosis issues.  Lumbar spine scoliosis makes BMD assessment difficult.  I viewed the 12/2016 DEXA images with radiologist today  Records indicate previous alendronate med use ~2010, though unclear how long he took this medication  Bone thinning risk factors with immobility due to CP, previous vit D deficiency, possibly other causes    Plan:  Discussed general issues with the osteoporosis diagnosis and management  Reviewed concept of using the DEXA scan imaging to assess bone mineral density (BMD)  Discussed terminology of the T-score   We discussed lab tests  to assess for secondary causes of bone thinning  Reviewed some of the medication treatment options    Recommend:  Check labs to assess for possible secondary causes for bone thinning/osteoporosis  Need to assess his risks for fracture.. when transfers at home  Suspect he'd benefit from med treatment such as Prolia 60 mg subcutaneous Q 6 months vs IV Reclast  Will need more organized calcium and vit D supplement use plan  Avoid heavy lifting and fall injuries  DEXA imaging:   Would not repeat DEXA at this time   Repeat DEXA scan in 2 yrs after restarting osteoporosis med treatment plan    Ambrocio to follow up with Primary Care provider regarding elevated blood pressure.  Addressed patient questions today    Labs ordered today:   Orders Placed This Encounter   Procedures     Basic metabolic panel     Parathyroid Hormone Intact     Protein electrophoresis     Testosterone total     Radiology/Consults ordered today: None    More than 50% of the time spent with Mr. Delvalle on counseling / coordinating his care.  Total appointment time was 60 minutes.    Follow-up:  10/22/19 at 12:30pm    ALFONSO Gale MD, MS [facility]    CC: Ezekiel Quevedo

## 2019-10-07 LAB
ALBUMIN SERPL ELPH-MCNC: 4.1 G/DL (ref 3.7–5.1)
ALPHA1 GLOB SERPL ELPH-MCNC: 0.4 G/DL (ref 0.2–0.4)
ALPHA2 GLOB SERPL ELPH-MCNC: 0.8 G/DL (ref 0.5–0.9)
B-GLOBULIN SERPL ELPH-MCNC: 0.9 G/DL (ref 0.6–1)
GAMMA GLOB SERPL ELPH-MCNC: 1 G/DL (ref 0.7–1.6)
M PROTEIN SERPL ELPH-MCNC: 0 G/DL
PROT PATTERN SERPL ELPH-IMP: NORMAL

## 2019-10-08 LAB — TESTOST SERPL-MCNC: 243 NG/DL (ref 240–950)

## 2019-10-22 ENCOUNTER — OFFICE VISIT (OUTPATIENT)
Dept: ENDOCRINOLOGY | Facility: CLINIC | Age: 59
End: 2019-10-22
Payer: MEDICARE

## 2019-10-22 VITALS — SYSTOLIC BLOOD PRESSURE: 140 MMHG | DIASTOLIC BLOOD PRESSURE: 82 MMHG | HEART RATE: 76 BPM

## 2019-10-22 DIAGNOSIS — G80.9 CEREBRAL PALSY, UNSPECIFIED TYPE (H): ICD-10-CM

## 2019-10-22 DIAGNOSIS — M81.0 OSTEOPOROSIS WITHOUT CURRENT PATHOLOGICAL FRACTURE, UNSPECIFIED OSTEOPOROSIS TYPE: Primary | ICD-10-CM

## 2019-10-22 PROCEDURE — 99214 OFFICE O/P EST MOD 30 MIN: CPT | Performed by: INTERNAL MEDICINE

## 2019-10-22 NOTE — PROGRESS NOTES
Name: Ambrocio Delvalle    Chief Complaint   Patient presents with     Osteoporosis     HPI:  Recent issues:  José Miguel here for f/u osteoporosis evaluation, with his mother Ivette  We reviewed the previous DEXA report summary and recent lab tests.        History of cerebral palsy, diagnosed during early childhood  Physical limitations with leg weakness, extremity ROM, leg muscle spasms/rigidity  Has used wheelchair (WC) for many years... motorized WC approx 30 yrs  Meals TID, able to feed self with utensils  Transfer assistance to commode and bed with assistance, also uses [employer] Lift device    Previous medical evaluations with Dr. IVORY Quevedo  Previous DEXA scans include:  11/3/10 DEXA:   L1-4   T-score: -0.2   Left fem neck:  T-score: -1.5   Left total hip:  T-score: -2.9    12/19/16 DEXA:   L1-4    T-score: 0.7    Lumbar scoliosis is noted.    Left fem neck:  T-score: -4.0   Left total hip:  T-score: -3.9   Right fem neck: T-score: -2.6   Right total hip:  T-score: -2.8  Previous [facility] labs include:     Lab Test 07/16/19  1310 04/09/18  1320 11/23/16  1153 09/11/15  1122 04/25/13  1510 04/18/13  1057   ROBINA 8.8 9.1 9.3 9.2  --  9.0   VITDT 70 57 77* 46 57  --      Health history includes:  Bone fractures:  Left tibia fracture  Vit D deficiency:  Yes   Kidney stones:  Yes  Supplements:   None  Osteoporosis meds:  Records from [facility] indicate alendronate use ~2010      Patient and his mother don't recall this med use, however  Fam Hx osteoporosis:  None known    Recent [facility] labs include:  Lab Results   Component Value Date     10/04/2019    POTASSIUM 3.7 10/04/2019    CHLORIDE 105 10/04/2019    CO2 27 10/04/2019    ANIONGAP 8 10/04/2019    GLC 91 10/04/2019    BUN 12 10/04/2019    CR 0.79 10/04/2019    GFRESTIMATED >90 10/04/2019    GFRESTBLACK >90 10/04/2019    ROBINA 9.1 10/04/2019    TSH 1.55 07/16/2019    VITDT 70 07/16/2019    PTHI 60 10/04/2019         Single, lives with his mother Ivette in [place]  Sees Dr. Falcon  [facility] for general medicine evaluations.    PMH/PSH:  Past Medical History:   Diagnosis Date     Cerebral palsy (H)      Depression, major, in partial remission (H)      High cholesterol      Hypertension      Low HDL (under 40)      Lumbar scoliosis      Lung nodule 2002    fu benign     Microhematuria 2007    ct neg cysto inflammation     Nasal congestion      Nephrolithiasis 1986    urol eval Dr. Beverly     Osteoporosis 2006, fu 2010    dexa 11/10 -1.5 fem neck and -2.9 total hip; fu 2016 and worse, -4.0 hip     Pharyngitis 2002    hosp fsd     Pulmonary HTN (H) 2008    seen on echo, fu 12/16 not seen, nl ef, grade 1 dd     Reflex sympathetic dystrophy of lower extremity      Right ventricular hypertrophy 2008 on echo    ?due to sandie, had fu with Dr. Gaitan, never did sleep study, fu echo 12/16 no rvh seen     Sweats, sweating, excessive 2008    echo with rvh and pulm htn, ct chest, abd and pelvis with liver cysts and old lung nodule, cosyntropin stim test neg     Urethral stricture 2007    had cysto and ct done for hematuria as well     Vitamin D deficiency      Past Surgical History:   Procedure Laterality Date     GENITOURINARY SURGERY      lithortipsy,  ureteral widened     ORTHOPEDIC SURGERY      left knee- abductors lengthed heel cords lengthened, tendons trransferred , patella lowered,       Family Hx:  Family History   Problem Relation Age of Onset     Prostate Cancer Father          Social Hx:  Social History     Socioeconomic History     Marital status: Single     Spouse name: Not on file     Number of children: o     Years of education: Not on file     Highest education level: Not on file   Occupational History     Employer: DISABLED   Social Needs     Financial resource strain: Not on file     Food insecurity:     Worry: Not on file     Inability: Not on file     Transportation needs:     Medical: Not on file     Non-medical: Not on file   Tobacco Use     Smoking status: Never Smoker      Smokeless tobacco: Never Used   Substance and Sexual Activity     Alcohol use: No     Alcohol/week: 0.0 standard drinks     Drug use: No     Sexual activity: Not Currently   Lifestyle     Physical activity:     Days per week: Not on file     Minutes per session: Not on file     Stress: Not on file   Relationships     Social connections:     Talks on phone: Not on file     Gets together: Not on file     Attends Quaker service: Not on file     Active member of club or organization: Not on file     Attends meetings of clubs or organizations: Not on file     Relationship status: Not on file     Intimate partner violence:     Fear of current or ex partner: Not on file     Emotionally abused: Not on file     Physically abused: Not on file     Forced sexual activity: Not on file   Other Topics Concern     Not on file   Social History Narrative     Not on file          MEDICATIONS:  has a current medication list which includes the following prescription(s): baclofen, cetirizine, cholecalciferol, diazepam, flonase, ibuprofen, lipitor, order for dme, order for dme, order for dme, potassium chloride er, sertraline, and triamterene-hctz.    ROS:     ROS: 10 point ROS neg other than the symptoms noted above in the HPI.    GENERAL: fatigue, wt stable?; denies fevers, chills, night sweats.    HEENT: no dysphagia, odonophagia, diplopia, neck pain  THYROID:  no apparent hyper or hypothyroid symptoms  CV: no chest pain, pressure, palpitations  LUNGS: no SOB, DUKE, cough, wheezing   ABDOMEN: no diarrhea, constipation, abdominal pain  EXTREMITIES: no rashes, ulcers, edema  NEUROLOGY: no headaches, denies changes in vision, tingling  MSK:  Severe leg muscle weakness, spasms with movement, limited shoulder/upper arm ROM above horizontal  SKIN: no rashes or lesions  : reports having urine and bowel control  PSYCH:  stable mood, no significant anxiety or depression  ENDOCRINE: no heat or cold intolerance    Physical Exam   VS: BP (!)  140/82   Pulse 76   GENERAL: AXOX3, NAD, eyeglasses, seated in WC, well dressed, answering questions appropriately, appears stated age.  THYROID:  normal gland, no apparent nodules or goiter  HEENT: lateral deviation left eye, tracking with right eye, neck non-tender, no exopthalmous, no proptosis  ABDOMEN: mildly obese, soft, nontender, nondistended  EXTREMITIES: leg muscle atrophy bilaterally, small feet in support shoes, mild ankle edema, feet not examined  NEUROLOGY: CN grossly intact, spasms at thighs with leg movement, no tremors  MSK: grossly intact  SKIN: facial beard, no rashes, no lesions    LABS:    All pertinent notes, labs, and images personally reviewed by me.     A/P:  Encounter Diagnoses   Name Primary?     Osteoporosis without current pathological fracture, unspecified osteoporosis type Yes     Cerebral palsy, unspecified type (H)          Comments:  Reviewed complicated health history and osteoporosis issues.  Records indicate previous alendronate med use ~2010, though unclear how long he took this medication  Bone thinning risk factors with immobility due to CP, previous vit D deficiency, possibly other causes    Plan:  Discussed general issues with the osteoporosis diagnosis and management  Reviewed concept of using the DEXA scan imaging to assess bone mineral density (BMD)  Discussed terminology of the T-score   We discussed lab tests to assess for secondary causes of bone thinning  Reviewed some of the medication treatment options    Recommend:  Advised restarting medication treatment as Prolia 60 mg subcutaneous Q 6 months, he agreed.   Reviewed the Prolia medication, potential benefits and risks   Discussed Prolia insurance verification, dosing at our [facility] clinic  No labs ordered today  Continue dietary calcium intake  Avoid fall injuries with wheelchair/commode/bed transfers  Repeat DEXA scan 1-year after starting Prolia... ~11/2020  See me annually, contact me if questions  about plan    Ambrocio to follow up with Primary Care provider regarding elevated blood pressure.  Addressed patient questions today    Labs ordered today:   No orders of the defined types were placed in this encounter.    Radiology/Consults ordered today: None    More than 50% of the time spent with Mr. Delvalle on counseling / coordinating his care.  Total appointment time was 25 minutes.    Follow-up:  1 year    ALFONSO Gale MD, MS  Endocrinology  [facility]    CC: Ezekiel Quevedo

## 2019-10-22 NOTE — PATIENT INSTRUCTIONS
PROLIA  Risk Evaluation and Mitigation Strategy Best Practice Advisory    In May 2015, the FDA required an update to the PROLIA  (denosumab) REMS (Risk Evaluation and Mitigation Strategy) to inform both providers and patients about potential serious risks related to PROLIA .    These potential serious risks include:    Hypocalcemia    Osteonecrosis of the jaw    Atypical femoral fractures    Serious Infections    Dermatologic reactions    To ensure compliance with these requirements [facility] has developed a workflow for those patients who will receive PROLIA  at clinic.  This workflow includes insurance verification, patient scheduling, patient education, and documentation. Registered Nurses in the clinic should have completed eLearning related to the REMS and the clinic workflow.  Refer to them to initiate this process.  This workflow does not need to be executed if the patient is to receive PROLIA  injection at [facility].       The  has put together a Patient Education Toolkit.  Copies of these handouts may be available your clinic. Patients must receive the Patient Brochure and Medication Guide when receiving injection at clinic.  These will be attached to the injection ordered from [facility] [employer] to the clinic. Links to handouts:  Patient Counseling Chart for Healthcare Providers  Patient Brochure  Prescribing Information  Medication Guide    If you are having trouble accessing the above links, these documents can be found at: http://[email or web address]    The role of healthcare provider includes reviewing patient education materials with the patient, advising patients to seek medical attention if they have signs or symptoms of one of the serious risks, and provide patients a copy of the Patient Brochure and Medication Guide when receiving their injection.      Due to the risk of hypocalcemia the Prescribing  Information for PROLIA  recommends that calcium and mineral levels (magnesium and phosphorus) be checked within 14 days of injection for those predisposed to hypocalcemia.

## 2019-10-23 ENCOUNTER — TELEPHONE (OUTPATIENT)
Dept: ENDOCRINOLOGY | Facility: CLINIC | Age: 59
End: 2019-10-23

## 2019-10-23 NOTE — TELEPHONE ENCOUNTER
----- Message from Kurtis Gale MD sent at 10/22/2019 12:55 PM CDT -----  Regarding: Prolia  This patient has a diagnosis of osteoporosis and needs to received Prolia 60 mg subcutaneous injections at our clinic.  I have reviewed this medication with him and he agrees, feels the upper arm injection would go well despite his cerebral palsy problem.    Please verify insurance coverage for the Prolia medication. Thanks.      ALFONSO Gale MD, MS  Endocrinology  [facility]

## 2019-10-25 NOTE — TELEPHONE ENCOUNTER
[employer] verification complete. Patient has 100% coverage with Medicare and BCBS supplemental plan. Patient's tertiary insurance with Medicaid will not disclose benefits to [employer] but primary and secondary insurance will most likely  total cost of Prolia injection. Patient can be scheduled for Prolia injection.    Cas Du CMA on 10/25/2019 at 9:45 AM

## 2019-11-19 ENCOUNTER — ALLIED HEALTH/NURSE VISIT (OUTPATIENT)
Dept: NURSING | Facility: CLINIC | Age: 59
End: 2019-11-19
Payer: MEDICARE

## 2019-11-19 VITALS — OXYGEN SATURATION: 94 % | DIASTOLIC BLOOD PRESSURE: 90 MMHG | HEART RATE: 81 BPM | SYSTOLIC BLOOD PRESSURE: 146 MMHG

## 2019-11-19 DIAGNOSIS — Z23 NEED FOR PROPHYLACTIC VACCINATION AND INOCULATION AGAINST INFLUENZA: Primary | ICD-10-CM

## 2019-11-19 PROCEDURE — G0008 ADMIN INFLUENZA VIRUS VAC: HCPCS

## 2019-11-19 PROCEDURE — 90682 RIV4 VACC RECOMBINANT DNA IM: CPT

## 2019-11-19 PROCEDURE — 99207 ZZC NO CHARGE NURSE ONLY: CPT

## 2019-12-20 ENCOUNTER — ALLIED HEALTH/NURSE VISIT (OUTPATIENT)
Dept: NURSING | Facility: CLINIC | Age: 59
End: 2019-12-20
Payer: MEDICARE

## 2019-12-20 DIAGNOSIS — M81.0 OSTEOPOROSIS: Primary | ICD-10-CM

## 2019-12-20 PROCEDURE — 99207 ZZC NO CHARGE NURSE ONLY: CPT

## 2019-12-20 PROCEDURE — 96372 THER/PROPH/DIAG INJ SC/IM: CPT

## 2019-12-20 NOTE — NURSING NOTE
Clinic Administered Medication Documentation    MEDICATION LIST:   Prolia Documentation    Prior to injection, verified patient identity using patient's name and date of birth. Medication was administered. Please see MAR and medication order for additional information. Patient instructed to remain in clinic for 15 minutes and report any adverse reaction to staff immediately .    Indication: Prolia  (denosumab) is a prescription medicine used to treat osteoporosis in patients who:     Are at high risk for fracture, meaning patients who have had a fracture related to osteoporosis, or who have multiple risk factors for fracture.    Cannot use another osteoporosis medicine or other osteoporosis medicines did not work well.    The timeline for early/late injections would be 4 weeks early and any time after the 6 month debora. If a patient receives their injection late, then the subsequent injection would be 6 months from the date that they actually received the injection.    1.  When was the last injection?  Today is first  2.  Did they check with their insurance for this calendar year?  yes  3.  Is there an order in the chart?  yes  4.  Has the patient had dental work involving the bone in the past month or will have work in the next 6 months?  No    The following steps were completed to comply with the REMS program for Prolia:    Reviewed information in the Medication Guide and Patient Counseling Chart, including the serious risks of Prolia  and the symptoms of each risk.    Advised patient to seek prompt medical attention if they have signs or symptoms of any of the serious risks.    Provided each patient a copy of the Medication Guide and Patient Brochure.    Was entire vial of medication used? Yes  Vial/Syringe: Syringe  Expiration Date:  04/22  Tiffanie Dan RN on 12/20/2019 at 2:10 PM

## 2020-01-27 ENCOUNTER — TELEPHONE (OUTPATIENT)
Dept: FAMILY MEDICINE | Facility: CLINIC | Age: 60
End: 2020-01-27

## 2020-01-27 NOTE — TELEPHONE ENCOUNTER
Reason for Call:  Same Day Appointment, Requested Provider: Emy      PCP: Ezekiel Quevedo    Reason for visit: Diag with Black Mold symptoms of  temp sorethroat and chest mucous.    Duration of symptoms: Since last visit with Dr Ruiz    Have you been treated for this in the past? Yes    Additional comments: Please try to get patient in to see Dr Quevedo   Pt needs transportion needs 48 hours notice    Can we leave a detailed message on this number? YES    Phone number patient can be reached at: Cell number on file:    Telephone Information:   Mobile [phone]       Best Time: anytime    Call taken on 1/27/2020 at 11:41 AM by Frieda Page

## 2020-02-19 ENCOUNTER — TELEPHONE (OUTPATIENT)
Dept: FAMILY MEDICINE | Facility: CLINIC | Age: 60
End: 2020-02-19

## 2020-02-19 NOTE — TELEPHONE ENCOUNTER
Prior Authorization Retail Medication Request    Medication/Dose: Lipitor 20 mg (brand name)  ICD code (if different than what is on RX):  E78.5  Previously Tried and Failed:  Atorvastatin  Rationale:  Patient developed generalized muscle aches while on generic Atorvastatin    Insurance Name:  [place] [employer]  Insurance ID:  [id number]      Pharmacy Information (if different than what is on RX)  Name:  [id number]  Phone:  [phone]

## 2020-02-21 NOTE — TELEPHONE ENCOUNTER
Prior Authorization Not Needed per Insurance    Medication: Lipitor 20 mg (brand name) M- NOT NEEDED  Insurance Company:    Expected CoPay:      Pharmacy Filling the Rx: [facility] - [place], [place] - [address]  Pharmacy Notified: Yes  Patient Notified: Yes    CALLED PHARMACY TO GET PRIMARY COVERAGE SCREEN SHOT - spoke with Maurizio  They stated to disreguard the request - Med went through INS and PT has already picked med up

## 2020-03-12 DIAGNOSIS — R25.2 SPASM: ICD-10-CM

## 2020-03-13 DIAGNOSIS — R25.2 SPASM: ICD-10-CM

## 2020-03-13 NOTE — TELEPHONE ENCOUNTER
Requested Prescriptions   Pending Prescriptions Disp Refills     diazepam (VALIUM) 5 MG tablet [Pharmacy Med Name: DIAZEPAM 5MG TAB 5 TAB]  Controlled Substance Refill Request for     Last refill: 7/10/19    Last clinic visit: 10/22/19     Clinic visit frequency required:  Next appt: none    Controlled substance agreement on file: No.    Documentation in problem list reviewed:  No    Processing:  Patient will  in clinic     Chart says this medication was discontinued    RX [place] reviewed: no  [place]:  [email or web address]       60 tablet 0     Sig: TAKE ONE TABLET BY MOUTH NIGHTLY AS NEEDED       There is no refill protocol information for this order

## 2020-03-13 NOTE — TELEPHONE ENCOUNTER
RX [place]) reviewed:  reviewed- no concerns 3/13/2020 JS    [place]:  [email or web address]    Routing refill request to provider for review/approval because:  Drug not on the [facility] refill protocol     MAVIS Reich, RN  [employer] [facility]

## 2020-03-14 RX ORDER — DIAZEPAM 5 MG
TABLET ORAL
Qty: 60 TABLET | Refills: 0 | Status: SHIPPED | OUTPATIENT
Start: 2020-03-14 | End: 2020-10-07

## 2020-03-14 NOTE — TELEPHONE ENCOUNTER
Requested Prescriptions   Pending Prescriptions Disp Refills     diazepam (VALIUM) 5 MG tablet [Pharmacy Med Name: DIAZEPAM 5MG TAB 5 TAB] 60 tablet 0     Sig: TAKE ONE TABLET BY MOUTH NIGHTLY AS NEEDED       There is no refill protocol information for this order        Last Written Prescription Date:  7/10/19  Last Fill Quantity: 60 tablet,  # refills: 0   Last office visit: 7/16/2019 with prescribing provider:  Emy   Future Office Visit:

## 2020-03-16 RX ORDER — DIAZEPAM 5 MG
TABLET ORAL
Qty: 60 TABLET | Refills: 0 | OUTPATIENT
Start: 2020-03-16

## 2020-04-01 ENCOUNTER — TELEPHONE (OUTPATIENT)
Dept: FAMILY MEDICINE | Facility: CLINIC | Age: 60
End: 2020-04-01

## 2020-04-01 DIAGNOSIS — E78.5 HYPERLIPIDEMIA LDL GOAL <130: ICD-10-CM

## 2020-04-01 RX ORDER — ATORVASTATIN CALCIUM 20 MG
20 TABLET ORAL DAILY
Qty: 90 TABLET | Refills: 1 | Status: SHIPPED | OUTPATIENT
Start: 2020-04-01 | End: 2020-04-01

## 2020-04-01 RX ORDER — ATORVASTATIN CALCIUM 20 MG
20 TABLET ORAL DAILY
Qty: 90 TABLET | Refills: 1 | Status: SHIPPED | OUTPATIENT
Start: 2020-04-01 | End: 2020-07-31

## 2020-04-01 NOTE — TELEPHONE ENCOUNTER
Reason for Call:  Medication or medication refill:    Do you use a [facility]?  Name of the pharmacy and phone number for the current request:    [facility] - [place], [place] - [address] [place] [address]    Name of the medication requested: LIPITOR 20 MG tablet     Other request: will like PA for rx, and pt is out    Can we leave a detailed message on this number? YES    Phone number patient can be reached at: Home number on file [phone] (home)    Best Time: any    Call taken on 4/1/2020 at 2:47 PM by Candy Putnam

## 2020-04-01 NOTE — TELEPHONE ENCOUNTER
LDL Cholesterol Calculated   Date Value Ref Range Status   07/16/2019 108 (H) <100 mg/dL Final     Comment:     Above desirable:  100-129 mg/dl  Borderline High:  130-159 mg/dL  High:             160-189 mg/dL  Very high:       >189 mg/dl       Sent to [facility] per request. Does not look like PA will be needed, but if so, will go through pa process  Blanquita Hercules RN

## 2020-04-01 NOTE — TELEPHONE ENCOUNTER
Wife is calling in because the drug store has the rx for the generic lipitor, however the pt takes the actual brand Lipitor.  Wife is stating that a prior auth would be necessary for the Lipitor to be rx'd.     Advised wife that the actual 'Lipitor' brand was rx'd via [employer] today and for her to call pharmacy to see if actual PA is necessary.

## 2020-04-02 ENCOUNTER — TELEPHONE (OUTPATIENT)
Dept: FAMILY MEDICINE | Facility: CLINIC | Age: 60
End: 2020-04-02

## 2020-04-02 DIAGNOSIS — E78.5 HYPERLIPIDEMIA LDL GOAL <130: Primary | ICD-10-CM

## 2020-04-02 NOTE — TELEPHONE ENCOUNTER
Fax received from pharmacy stating Lipitor is not covered by pt's insurance and that the preferred alternatives are Atorvastatin, Simvastatin, and Lovastatin.     The encounter from 4/1/2020 is also regarding this situation.       Disp  Refills  Start  End  PALMER    LIPITOR 20 MG tablet  90 tablet  1  4/1/2020   Yes    Sig - Route: Take 1 tablet (20 mg) by mouth daily - Oral

## 2020-04-03 NOTE — TELEPHONE ENCOUNTER
Per pt's chart - he gets brand name due to muscle aches with the generic.  Do you want to do the PA on Lipitor?  Has been on Brand for years      Blanquita Hercules RN

## 2020-04-06 NOTE — TELEPHONE ENCOUNTER
PA Initiation    Medication: Lipitor 20 mg  Insurance Company: [employer] [place] - Phone [phone] Fax [phone]  Pharmacy Filling the Rx: [facility] [place] - [facility] [address] [place] [address]  Filling Pharmacy Phone: [phone]  Filling Pharmacy Fax: [phone]  Start Date: 4/6/2020

## 2020-04-06 NOTE — TELEPHONE ENCOUNTER
Prior Authorization Retail Medication Request    Medication/Dose: Lipitor 20 mg  ICD code (if different than what is on RX):  E78.5  Previously Tried and Failed:  Atorvastatin  Rationale:  Patient developed muscle cramps while on generic Atorvastatin and has had no side effects since switching to brand name    Insurance Name:  [place] [employer]  Insurance ID:  [id number]      Pharmacy Information (if different than what is on RX)  Name:  [facility]  Phone:  [phone]    CoverMyMeds key: [id number]

## 2020-04-07 NOTE — TELEPHONE ENCOUNTER
PA Initiation    Medication: Lipitor 20 mg  Insurance Company: [employer] - Phone [phone] Fax [phone]  Pharmacy Filling the Rx: [facility] [place] - [facility] [address] [place] [address]  Filling Pharmacy Phone: [phone]  Filling Pharmacy Fax: [phone]  Start Date: 4/6/2020

## 2020-04-07 NOTE — TELEPHONE ENCOUNTER
PRIOR AUTHORIZATION DENIED    Medication: Lipitor 20 mg--DENIED    Denial Date: 4/7/2020    Denial Rational: Patient would need the preferred agents which are simvastatin, lovastatin, rosuvastatin.     Appeal Information:

## 2020-04-08 RX ORDER — ROSUVASTATIN CALCIUM 10 MG/1
10 TABLET, COATED ORAL DAILY
Qty: 90 TABLET | Refills: 3 | Status: SHIPPED | OUTPATIENT
Start: 2020-04-08 | End: 2020-11-16

## 2020-04-08 NOTE — TELEPHONE ENCOUNTER
Prior authorization has been denied stating that patient needs to try and fail Simvastatin, Lovastatin, and Rosuvastatin. If patient cannot/ has contraindications to these medications, please write letter of medical necessity detailed why patient requires brand name Lipitor vs other statin medications.    Cas Du, CMA on 4/8/2020 at 1:49 PM

## 2020-04-09 NOTE — TELEPHONE ENCOUNTER
MEDICATION APPEAL APPROVED    Medication: Lipitor 20 mg--APPEAL APPROVED  Authorization Effective Date: 4/9/2020  Authorization Expiration Date: 12/31/2020  Approved Dose/Quantity:   Reference #:     Insurance Company:    Expected CoPay:       CoPay Card Available:      Foundation Assistance Needed:    Which Pharmacy is filling the prescription (Not needed for infusion/clinic administered): [facility] - [place], [place] - [address]

## 2020-07-30 DIAGNOSIS — E78.5 HYPERLIPIDEMIA LDL GOAL <130: ICD-10-CM

## 2020-07-31 RX ORDER — ATORVASTATIN CALCIUM 20 MG
TABLET ORAL
Qty: 175 TABLET | Refills: 0 | Status: SHIPPED | OUTPATIENT
Start: 2020-07-31 | End: 2021-03-02

## 2020-10-06 DIAGNOSIS — F33.41 RECURRENT MAJOR DEPRESSIVE DISORDER, IN PARTIAL REMISSION (H): ICD-10-CM

## 2020-10-06 DIAGNOSIS — M62.838 MUSCLE SPASM: ICD-10-CM

## 2020-10-06 DIAGNOSIS — R25.2 SPASM: ICD-10-CM

## 2020-10-06 DIAGNOSIS — I10 BENIGN ESSENTIAL HYPERTENSION: ICD-10-CM

## 2020-10-07 RX ORDER — POTASSIUM CHLORIDE 600 MG/1
TABLET, FILM COATED, EXTENDED RELEASE ORAL
Qty: 180 TABLET | Refills: 0 | Status: SHIPPED | OUTPATIENT
Start: 2020-10-07 | End: 2020-11-16

## 2020-10-07 RX ORDER — SERTRALINE HYDROCHLORIDE 100 MG/1
TABLET, FILM COATED ORAL
Qty: 180 TABLET | Refills: 0 | Status: SHIPPED | OUTPATIENT
Start: 2020-10-07 | End: 2020-11-16

## 2020-10-07 RX ORDER — DIAZEPAM 5 MG
TABLET ORAL
Qty: 30 TABLET | Refills: 0 | Status: SHIPPED | OUTPATIENT
Start: 2020-10-07 | End: 2020-12-23

## 2020-10-07 ASSESSMENT — PATIENT HEALTH QUESTIONNAIRE - PHQ9: SUM OF ALL RESPONSES TO PHQ QUESTIONS 1-9: 0

## 2020-10-07 NOTE — TELEPHONE ENCOUNTER
"Potassium 8mEq CR - 2 tablets daily 180 with 3 refills 7/17/19  Diazepam 5mg - #60 with 0 refills 3/14/2020   Sertraline 100mg- #180 with 0 refills 7/17/19     checked 10/7/2020 LA     Last visit 7/16/2019    Called patient - he has not left home, cannot do a video visit but willing to do a phone visit    Completed PHQ-9     Sertraline - Rx approved     Potassium - labs not current   Diazepam -not on refill protocol      Soha OBRIEN RN      Requested Prescriptions   Pending Prescriptions Disp Refills     potassium chloride ER (KLOR-CON) 8 MEQ CR tablet [Pharmacy Med Name: POTASSIUM CL ER 8 MEQ TAB 8 Tablet] 170 tablet 0     Sig: TAKE 2 TABLETS (16 MEQ) BY MOUTH DAILY *PERRIGO BRAND*       Potassium Supplements Protocol Failed - 10/6/2020  2:08 PM        Failed - Recent (12 mo) or future (30 days) visit within the authorizing provider's department     Patient has had an office visit with the authorizing provider or a provider within the authorizing providers department within the previous 12 mos or has a future within next 30 days. See \"Patient Info\" tab in inbasket, or \"Choose Columns\" in Meds & Orders section of the refill encounter.              Failed - Normal serum potassium in past 12 months     Recent Labs   Lab Test 10/04/19  1411   POTASSIUM 3.7                    Passed - Medication is active on med list        Passed - Patient is age 18 or older           diazepam (VALIUM) 5 MG tablet [Pharmacy Med Name: DIAZEPAM 5MG TAB 5 Tablet] 30 tablet 0     Sig: TAKE ONE TABLET BY MOUTH NIGHTLY AS NEEDED       There is no refill protocol information for this order        sertraline (ZOLOFT) 100 MG tablet [Pharmacy Med Name: SERTRALINE  MG TABS 100 Tablet] 180 tablet 1     Sig: TAKE 2 TABLETS BY MOUTH DAILY *NORTHSTAR BRAND*       SSRIs Protocol Failed - 10/6/2020  2:08 PM        Failed - PHQ-9 score less than 5 in past 6 months     Please review last PHQ-9 score.           Failed - Recent (6 mo) or future (30 " "days) visit within the authorizing provider's specialty     Patient had office visit in the last 6 months or has a visit in the next 30 days with authorizing provider or within the authorizing provider's specialty.  See \"Patient Info\" tab in inbasket, or \"Choose Columns\" in Meds & Orders section of the refill encounter.            Passed - Medication is active on med list        Passed - Patient is age 18 or older             "

## 2020-10-22 DIAGNOSIS — R25.2 SPASM: ICD-10-CM

## 2020-10-22 RX ORDER — BACLOFEN 20 MG/1
20 TABLET ORAL
Qty: 450 TABLET | Refills: 3 | Status: SHIPPED | OUTPATIENT
Start: 2020-10-22 | End: 2020-11-16

## 2020-10-22 NOTE — TELEPHONE ENCOUNTER
Routing refill request to provider for review/approval because:  Drug not on the [facility] refill protocol   Please authorize if appropriate.  Thanks,  Chinyere Stallworth RN    Has apt with PCP next week.    Requested Prescriptions   Pending Prescriptions Disp Refills     baclofen (LIORESAL) 20 MG tablet 450 tablet 3     Sig: Take 1 tablet (20 mg) by mouth 5 times daily       There is no refill protocol information for this order

## 2020-11-16 ENCOUNTER — VIRTUAL VISIT (OUTPATIENT)
Dept: FAMILY MEDICINE | Facility: CLINIC | Age: 60
End: 2020-11-16
Payer: MEDICARE

## 2020-11-16 DIAGNOSIS — M62.838 MUSCLE SPASM: ICD-10-CM

## 2020-11-16 DIAGNOSIS — G80.1 SPASTIC DIPLEGIC CEREBRAL PALSY (H): ICD-10-CM

## 2020-11-16 DIAGNOSIS — Z00.00 ROUTINE GENERAL MEDICAL EXAMINATION AT A HEALTH CARE FACILITY: Primary | ICD-10-CM

## 2020-11-16 DIAGNOSIS — M54.50 CHRONIC MIDLINE LOW BACK PAIN WITHOUT SCIATICA: ICD-10-CM

## 2020-11-16 DIAGNOSIS — I10 BENIGN ESSENTIAL HYPERTENSION: ICD-10-CM

## 2020-11-16 DIAGNOSIS — R25.2 SPASM: ICD-10-CM

## 2020-11-16 DIAGNOSIS — G90.523 COMPLEX REGIONAL PAIN SYNDROME TYPE 1 OF BOTH LOWER EXTREMITIES: ICD-10-CM

## 2020-11-16 DIAGNOSIS — E78.5 HYPERLIPIDEMIA LDL GOAL <130: ICD-10-CM

## 2020-11-16 DIAGNOSIS — F33.41 RECURRENT MAJOR DEPRESSIVE DISORDER, IN PARTIAL REMISSION (H): ICD-10-CM

## 2020-11-16 DIAGNOSIS — M81.0 OSTEOPOROSIS, UNSPECIFIED OSTEOPOROSIS TYPE, UNSPECIFIED PATHOLOGICAL FRACTURE PRESENCE: ICD-10-CM

## 2020-11-16 DIAGNOSIS — R09.81 NASAL CONGESTION: ICD-10-CM

## 2020-11-16 DIAGNOSIS — E55.9 VITAMIN D DEFICIENCY: ICD-10-CM

## 2020-11-16 DIAGNOSIS — G89.29 CHRONIC MIDLINE LOW BACK PAIN WITHOUT SCIATICA: ICD-10-CM

## 2020-11-16 PROCEDURE — G0439 PPPS, SUBSEQ VISIT: HCPCS | Mod: 95 | Performed by: INTERNAL MEDICINE

## 2020-11-16 RX ORDER — TRIAMTERENE AND HYDROCHLOROTHIAZIDE 37.5; 25 MG/1; MG/1
1 CAPSULE ORAL EVERY MORNING
Qty: 90 CAPSULE | Refills: 3 | Status: SHIPPED | OUTPATIENT
Start: 2020-11-16 | End: 2021-07-19

## 2020-11-16 RX ORDER — BACLOFEN 20 MG/1
20 TABLET ORAL
Qty: 450 TABLET | Refills: 3 | Status: SHIPPED | OUTPATIENT
Start: 2020-11-16 | End: 2022-01-21

## 2020-11-16 RX ORDER — ROSUVASTATIN CALCIUM 10 MG/1
10 TABLET, COATED ORAL DAILY
Qty: 90 TABLET | Refills: 3 | Status: SHIPPED | OUTPATIENT
Start: 2020-11-16 | End: 2021-03-12 | Stop reason: ALTCHOICE

## 2020-11-16 RX ORDER — SERTRALINE HYDROCHLORIDE 100 MG/1
TABLET, FILM COATED ORAL
Qty: 180 TABLET | Refills: 3 | Status: SHIPPED | OUTPATIENT
Start: 2020-11-16 | End: 2021-03-15

## 2020-11-16 RX ORDER — POTASSIUM CHLORIDE 600 MG/1
TABLET, FILM COATED, EXTENDED RELEASE ORAL
Qty: 180 TABLET | Refills: 3 | Status: SHIPPED | OUTPATIENT
Start: 2020-11-16 | End: 2021-03-15

## 2020-11-16 NOTE — PROGRESS NOTES
"Ambrocio Delvalle is a 60 year old male who is being evaluated via a billable telephone visit.      The patient has been notified of following:     \"This telephone visit will be conducted via a call between you and your physician/provider. We have found that certain health care needs can be provided without the need for a physical exam.  This service lets us provide the care you need with a short phone conversation.  If a prescription is necessary we can send it directly to your pharmacy.  If lab work is needed we can place an order for that and you can then stop by our lab to have the test done at a later time.    Telephone visits are billed at different rates depending on your insurance coverage. During this emergency period, for some insurers they may be billed the same as an in-person visit.  Please reach out to your insurance provider with any questions.    If during the course of the call the physician/provider feels a telephone visit is not appropriate, you will not be charged for this service.\"    Patient has given verbal consent for Telephone visit?  Yes    What phone number would you like to be contacted at? [phone]    How would you like to obtain your AVS? Mail a copy    Subjective     Ambrocio Delvalle is a 60 year old male who presents via phone visit today for the following health issues:    He has not been out of the house since February.  His depression is controlled at this time.  Overall he is doing well.   He does have cough and phlegm build up at times.  This has been ongoing for months with prior ent eval by Dr Ojeda but not much to do x zyrtec and flonse.  He had a prolia shot but is overdue for the next one, labs and also flu shot.  He has no other issues per patient and mother.    Past Medical History:   Diagnosis Date     Cerebral palsy (H)      Depression, major, in partial remission (H)      High cholesterol      Hypertension      Low HDL (under 40)      Lumbar scoliosis      Lung " nodule 2002    fu benign     Microhematuria 2007    ct neg cysto inflammation     Nasal congestion      Nephrolithiasis 1986    urol eval Dr. Beverly     Osteoporosis 2006, fu 2010    dexa 11/10 -1.5 fem neck and -2.9 total hip; fu 2016 and worse, -4.0 hip; seen by Dr. Gale 10/19 and started prolia     Pharyngitis 2002    hosp fsd     Pulmonary HTN (H) 2008    seen on echo, fu 12/16 not seen, nl ef, grade 1 dd     Reflex sympathetic dystrophy of lower extremity      Right ventricular hypertrophy 2008 on echo    ?due to sandie, had fu with Dr. Gaitan, never did sleep study, fu echo 12/16 no rvh seen     Sweats, sweating, excessive 2008    echo with rvh and pulm htn, ct chest, abd and pelvis with liver cysts and old lung nodule, cosyntropin stim test neg     Urethral stricture 2007    had cysto and ct done for hematuria as well     Vitamin D deficiency      Past Surgical History:   Procedure Laterality Date     GENITOURINARY SURGERY      lithortipsy,  ureteral widened     ORTHOPEDIC SURGERY      left knee- abductors lengthed heel cords lengthened, tendons trransferred , patella lowered,     Social History     Socioeconomic History     Marital status: Single     Spouse name: Not on file     Number of children: o     Years of education: Not on file     Highest education level: Not on file   Occupational History     Employer: DISABLED   Social Needs     Financial resource strain: Not on file     Food insecurity     Worry: Not on file     Inability: Not on file     Transportation needs     Medical: Not on file     Non-medical: Not on file   Tobacco Use     Smoking status: Never Smoker     Smokeless tobacco: Never Used   Substance and Sexual Activity     Alcohol use: No     Alcohol/week: 0.0 standard drinks     Drug use: No     Sexual activity: Not Currently   Lifestyle     Physical activity     Days per week: Not on file     Minutes per session: Not on file     Stress: Not on file   Relationships     Social connections      Talks on phone: Not on file     Gets together: Not on file     Attends Buddhism service: Not on file     Active member of club or organization: Not on file     Attends meetings of clubs or organizations: Not on file     Relationship status: Not on file     Intimate partner violence     Fear of current or ex partner: Not on file     Emotionally abused: Not on file     Physically abused: Not on file     Forced sexual activity: Not on file   Other Topics Concern     Not on file   Social History Narrative     Not on file     Current Outpatient Medications   Medication Sig Dispense Refill     baclofen (LIORESAL) 20 MG tablet Take 1 tablet (20 mg) by mouth 5 times daily 450 tablet 3     cetirizine (ZYRTEC) 10 MG tablet Take 10 mg by mouth daily       Cholecalciferol (VITAMIN D PO) Take  by mouth.       denosumab (PROLIA) 60 MG/ML SOSY injection Inject 1 mL (60 mg) Subcutaneous every 6 months 1 mL 1     diazepam (VALIUM) 5 MG tablet TAKE ONE TABLET BY MOUTH NIGHTLY AS NEEDED 30 tablet 0     FLONASE 50 MCG/ACT nasal spray Spray 1-2 sprays into both nostrils daily 16 g 5     ibuprofen (ADVIL,MOTRIN) 800 MG tablet Take 1 tablet by mouth every 8 hours as needed. 90 tablet 3     LIPITOR 20 MG tablet TAKE 1 TABLET (20 MG) BY MOUTH DAILY *[employer] BRAND* 175 tablet 0     order for DME Equipment being ordered: Wheelchair    Power wheelchair with tilt and recline function 1 Units 0     ORDER FOR DME Equipment being ordered: Hospital Bed 1 Units 0     ORDER FOR DME Equipment being ordered: Wheelchair 1 Units 0     potassium chloride ER (KLOR-CON) 8 MEQ CR tablet TAKE 2 TABLETS (16 MEQ) BY MOUTH DAILY *PERRIGO BRAND* 180 tablet 3     rosuvastatin (CRESTOR) 10 MG tablet Take 1 tablet (10 mg) by mouth daily 90 tablet 3     sertraline (ZOLOFT) 100 MG tablet TAKE 2 TABLETS BY MOUTH DAILY *NORTHSTAR BRAND* 180 tablet 3     triamterene-HCTZ (DYAZIDE) 37.5-25 MG capsule Take 1 capsule by mouth every morning 90 capsule 3     Allergies  "  Allergen Reactions     Penicillins Hives     Atorvastatin      Muscle aches to generic     Simvastatin      Muscle aches     FAMILY HISTORY NOTED AND REVIEWED    REVIEW OF SYSTEMS: above    PHYSICAL EXAM    There were no vitals taken for this visit.    ASSESSMENT:  1. Normal complete physical exam  2.depression, controlled  3.c.p. stable  4. Osteopor, will see if homecare can do prolia shot for him  5. Elevated cholesterol, on statin  6. Chronic pain, stable  7. Vit d defic, follow up labs  8.chronic cough and nasal mervin, cont flonase and zyrtec  9. Health care maintenance    PLAN:  Home care, hopefully can do labs, flu shot, prolia  Call if changes  Renewed meds    Ezekiel Quevedo M.D.      HPI     Annual Wellness Visit    Patient has been advised of split billing requirements and indicates understanding: Yes     Are you in the first 12 months of your Medicare Part B coverage?  No    Physical Health:    In general, how would you rate your overall physical health? good    Outside of work, how many days during the week do you exercise?none    Outside of work, approximately how many minutes a day do you exercise?not applicable    If you drink alcohol do you typically have >3 drinks per day or >7 drinks per week? No    Do you usually eat at least 4 servings of fruit and vegetables a day, include whole grains & fiber and avoid regularly eating high fat or \"junk\" foods? Yes    Do you have any problems taking medications regularly? YES    Do you have any side effects from medications? none    Needs assistance for the following daily activities: transportation, shopping, preparing meals, housework, bathing, laundry, money management and taking medicine    Which of the following safety concerns are present in your home?  none identified     Hearing impairment: No    In the past 6 months, have you been bothered by leaking of urine? no    There were no vitals taken for this visit.  Weight: Unable to obtain due to video " visit  Height: Unable to obtain due to video visit  BMI: Unable to obtain due to video visit  Blood Pressure: Unable to obtain due to video visit  excellentMental Health:    In general, how would you rate your overall mental or emotional health? excellent  PHQ-2 Score:      Do you feel safe in your environment? Yes    Have you ever done Advance Care Planning? (For example, a Health Directive, POLST, or a discussion with a medical provider or your loved ones about your wishes)? Yes, advance care planning is on file.    Fall risk:       Cognitive Screenin) Repeat 3 items (Leader, Season, Table)    2) Clock draw: NORMAL  3) 3 item recall: Recalls 3 objects  Results: 3 items recalled: COGNITIVE IMPAIRMENT LESS LIKELY    Mini-CogTM Copyright S Lupillo. Licensed by the author for use in [employer]; reprinted with permission (liliam@.[facility]). All rights reserved.      Do you have sleep apnea, excessive snoring or daytime drowsiness?: no    Current providers sharing in care for this patient include:   Patient Care Team:  Ezekiel Quevedo MD as PCP - General (Internal Medicine)  Ezekiel Quevedo MD as Assigned PCP  Kurtis Gale MD as Assigned Endocrinology Provider    Patient has been advised of split billing requirements and indicates understanding: yes, by ma        Assessment/Plan:    Phone call duration:  18 minutes

## 2020-11-17 ENCOUNTER — PATIENT OUTREACH (OUTPATIENT)
Dept: CARE COORDINATION | Facility: CLINIC | Age: 60
End: 2020-11-17

## 2020-11-17 ASSESSMENT — ACTIVITIES OF DAILY LIVING (ADL): DEPENDENT_IADLS:: INDEPENDENT

## 2020-11-17 NOTE — PROGRESS NOTES
Clinic Care Coordination Contact  [facility]/Voicemail    Referral Source: PCP  Clinical Data: Care Coordinator Outreach    CC SW received referral to assist pt receive at home lab draw, flu shot, and prolia.     Home care contacted, Medicare and Medicaid will not cover this one time service.    [facility] contacted, they can draw labs for $75 but not provide injections.    Outreach attempted x 1.  Left message on patient's voicemail with call back information and requested return call.    Plan: Care Coordinator will try to reach patient again in 1-2 business days.    ROBERT Pineda, [facility]  Clinic Care Coordinator  [facility]  [phone]  [email or web address]

## 2020-11-17 NOTE — LETTER
[facility] CARE COORDINATION  [address]  [place] [address]    November 20, 2020    Ambrocio Delvalle  [address]  [facility] [address]      Dear Ambrocio,    I am a clinic care coordinator who works with Ezekiel Quevedo MD at [facility]. I wanted to introduce myself and provide you with my contact information for you to be able to call me with any questions or concerns. Below is a description of clinic care coordination and how I can further assist you.      The clinic care coordination team is made up of a registered nurse,  and community health worker who understand the health care system. The goal of clinic care coordination is to help you manage your health and improve access to the health care system in the most efficient manner. The team can assist you in meeting your health care goals by providing education, coordinating services, strengthening the communication among your providers and supporting you with any resource needs.    Please feel free to contact me at [phone] with any questions or concerns. We are focused on providing you with the highest-quality healthcare experience possible and that all starts with you.     Sincerely,     ROBERT Pineda, [facility]  Clinic Care Coordinator  [facility]  [phone]  [email or web address]

## 2020-11-20 NOTE — PROGRESS NOTES
Clinic Care Coordination Contact  [facility] [email or web address]    Referral Source: PCP  Clinical Data: Care Coordinator Outreach  Outreach attempted x 2.  Left message on patient's voicemail with call back information and requested return call.  Plan: Care Coordinator will send care coordination introduction letter with care coordinator contact information and explanation of care coordination services via [email or web address]. Care Coordinator will do no further outreaches at this time.    ROBERT Pineda, [facility]  Clinic Care Coordinator  [facility]  [phone]  [email or web address]

## 2020-11-24 ENCOUNTER — PATIENT OUTREACH (OUTPATIENT)
Dept: NURSING | Facility: CLINIC | Age: 60
End: 2020-11-24
Payer: MEDICARE

## 2020-11-24 SDOH — HEALTH STABILITY: MENTAL HEALTH
STRESS IS WHEN SOMEONE FEELS TENSE, NERVOUS, ANXIOUS, OR CAN'T SLEEP AT NIGHT BECAUSE THEIR MIND IS TROUBLED. HOW STRESSED ARE YOU?: NOT AT ALL

## 2020-11-24 SDOH — ECONOMIC STABILITY: FOOD INSECURITY: WITHIN THE PAST 12 MONTHS, THE FOOD YOU BOUGHT JUST DIDN'T LAST AND YOU DIDN'T HAVE MONEY TO GET MORE.: NEVER TRUE

## 2020-11-24 SDOH — SOCIAL STABILITY: SOCIAL NETWORK: ARE YOU MARRIED, WIDOWED, DIVORCED, SEPARATED, NEVER MARRIED, OR LIVING WITH A PARTNER?: NEVER MARRIED

## 2020-11-24 SDOH — ECONOMIC STABILITY: TRANSPORTATION INSECURITY
IN THE PAST 12 MONTHS, HAS THE LACK OF TRANSPORTATION KEPT YOU FROM MEDICAL APPOINTMENTS OR FROM GETTING MEDICATIONS?: NO

## 2020-11-24 SDOH — SOCIAL STABILITY: SOCIAL NETWORK: HOW OFTEN DO YOU ATTEND CHURCH OR RELIGIOUS SERVICES?: NEVER

## 2020-11-24 SDOH — SOCIAL STABILITY: SOCIAL NETWORK: IN A TYPICAL WEEK, HOW MANY TIMES DO YOU TALK ON THE PHONE WITH FAMILY, FRIENDS, OR NEIGHBORS?: NEVER

## 2020-11-24 SDOH — ECONOMIC STABILITY: FOOD INSECURITY: WITHIN THE PAST 12 MONTHS, YOU WORRIED THAT YOUR FOOD WOULD RUN OUT BEFORE YOU GOT MONEY TO BUY MORE.: NEVER TRUE

## 2020-11-24 SDOH — SOCIAL STABILITY: SOCIAL NETWORK: HOW OFTEN DO YOU ATTENT MEETINGS OF THE CLUB OR ORGANIZATION YOU BELONG TO?: NEVER

## 2020-11-24 SDOH — ECONOMIC STABILITY: TRANSPORTATION INSECURITY
IN THE PAST 12 MONTHS, HAS LACK OF TRANSPORTATION KEPT YOU FROM MEETINGS, WORK, OR FROM GETTING THINGS NEEDED FOR DAILY LIVING?: NO

## 2020-11-24 SDOH — SOCIAL STABILITY: SOCIAL NETWORK
DO YOU BELONG TO ANY CLUBS OR ORGANIZATIONS SUCH AS CHURCH GROUPS UNIONS, FRATERNAL OR ATHLETIC GROUPS, OR SCHOOL GROUPS?: NO

## 2020-11-24 SDOH — ECONOMIC STABILITY: INCOME INSECURITY: HOW HARD IS IT FOR YOU TO PAY FOR THE VERY BASICS LIKE FOOD, HOUSING, MEDICAL CARE, AND HEATING?: NOT HARD AT ALL

## 2020-11-24 SDOH — SOCIAL STABILITY: SOCIAL NETWORK: HOW OFTEN DO YOU GET TOGETHER WITH FRIENDS OR RELATIVES?: MORE THAN THREE TIMES A WEEK

## 2020-11-24 ASSESSMENT — ACTIVITIES OF DAILY LIVING (ADL)
DEPENDENT_IADLS:: CLEANING;COOKING;LAUNDRY;SHOPPING;MEDICATION MANAGEMENT;MEAL PREPARATION;MONEY MANAGEMENT;TRANSPORTATION

## 2020-11-24 NOTE — LETTER
[facility]  Complex Care Plan  About Me:    Patient Name:  Ambrocio Delvalle    YOB: 1960  Age:         60 year old   [place] MRN:    [id number] Telephone Information:  Home Phone [phone]   Mobile [phone]       Address:  [address]  [place] [place] [address] Email address:  [email or web address]      Emergency Contact(s)    Name Relationship Lgl Grd Work Phone Home Phone Mobile Phone   LEANNA SANCHES Mother   [phone]            Primary language:  English     needed? No   [place] [employer] [facility]:  [phone] op. 1  Other communication barriers: None  Preferred Method of Communication:  Mail  Current living arrangement: I live in a private home with family  Mobility Status/ Medical Equipment: Dependent/Assisted by Another    Health Maintenance  Health Maintenance Reviewed: Up to date    My Access Plan  Medical Emergency 911   Primary Clinic Line [facility] - [phone]   24 Hour Appointment Line [phone] or  [phone] ([phone]) (toll-free)   24 Hour Nurse Line [phone] (toll-free)   Preferred Urgent Care [facility], [phone]   Preferred Hospital [facility]  [phone]   Preferred Pharmacy [facility] - [place], [place] - [address] Behavioral Health Crisis Line The National Suicide Prevention Lifeline at 1-329.959.6409 or 911             My Care Team Members  Patient Care Team       Relationship Specialty Notifications Start End    Ezekiel Quevedo MD PCP - General Internal Medicine  12/14/11     Phone: [phone] Fax: [phone]         [address] [place] [address]    Ezekiel Quevedo MD Assigned PCP   4/12/13     Phone: [phone] Fax: [phone] [address] [place] [address]    Kurtis Gale MD Assigned Endocrinology Provider   10/23/20     Phone: [phone] Fax: [phone] [address] [facility] [address] [facility] [address]    Summer Melton LGSW Lead Care Coordinator Primary Care - CC  11/24/20     Clara Jaime MA Community Health Worker   11/24/20             My Care Plans  Self Management and Treatment Plan  Goals and (Comments)  Goals        General    1. Psychosocial     Notes - Note edited  11/24/2020  4:13 PM by Summer Melton LGSW    Goal Statement: Within the next 2-3 months, I would like to follow medical recommendation of labs, flu shot, and prolia to support my health and wellbeing.   Date Goal Set: 11/24/2020  Barriers: Unable to get to medical appointments  Strengths: Motivated to address medical concerns  Date to Achieve By: 2/24/2020  Patient expressed understanding of goal: Ivette verbally stated understanding of goal    Action steps to achieve this goal:  1. Ivette will await resources from  SW  2. Ivette will outreach to Care Coordination  for further questions or concerns             Action Plans on File:                       Advance Care Plans/Directives Type:        My Medical and Care Information  Problem List   Patient Active Problem List   Diagnosis     Hyperlipidemia LDL goal <130     Benign essential hypertension     Cerebral palsy (H)     Vitamin D deficiency     Osteoporosis     Excessive sweating     Right ventricular hypertrophy     Recurrent major depressive disorder, in partial remission (H)     Complex regional pain syndrome type 1 of both lower extremities     Low HDL (under 40)     Chronic midline low back pain without sciatica        Care Coordination Start Date: 11/24/2020   Frequency of Care Coordination: weekly   Form Last Updated: 11/24/2020

## 2020-11-24 NOTE — PROGRESS NOTES
Clinic Care Coordination Contact    Clinic Care Coordination Contact  OUTREACH    Referral Information:  Referral Source: PCP    Primary Diagnosis: Psychosocial    Chief Complaint   Patient presents with     Clinic Care Coordination - Initial     Clinic Care Coordination - Post Hospital        [place] Utilization:   Clinic Utilization  Difficulty keeping appointments:: No  Compliance Concerns: No  No-Show Concerns: No  No PCP office visit in Past Year: No  Utilization    Last refreshed: 11/24/2020  1:45 PM: Hospital Admissions 0           Last refreshed: 11/24/2020  1:45 PM: ED Visits 0           Last refreshed: 11/24/2020  1:45 PM: No Show Count (past year) 0              Current as of: 11/24/2020  1:45 PM -           Clinical Concerns:  JUAN ANTONIO SEVILLA spoke with pt's mother regarding needs for labs, flu shot, and prolia. She explained that it is very difficult for pt to leave the home safely due to needing wheelchair transportation which is shared with other riders and has potential COVID exposure.    JUAN ANTONIO SEVILLA provided phone number for in home lab connection and cost of service was discussed.    Current Medical Concerns:  Needs labs    Current Behavioral Concerns: none    Education Provided to patient: CC role   Pain  Pain (GOAL):: No  Health Maintenance Reviewed: Up to date  Clinical Pathway: None    Medication Management:  Not discussed at this time     Functional Status:  Dependent ADLs:: Independent, Wheelchair-with assist  Dependent IADLs:: Cleaning, Cooking, Laundry, Shopping, Medication Management, Meal Preparation, Money Management, Transportation  Bed or wheelchair confined:: Yes  Mobility Status: Dependent/Assisted by Another  Fallen 2 or more times in the past year?: No  Any fall with injury in the past year?: No    Living Situation:  Current living arrangement:: I live in a private home with family  Type of residence:: Private home - stairs    Lifestyle & Psychosocial Needs:  Lifestyle     Physical activity      Days per week: Not on file     Minutes per session: Not on file     Stress: Not at all     Social Needs     Financial resource strain: Not hard at all     Food insecurity     Worry: Never true     Inability: Never true     Transportation needs     Medical: No     Non-medical: No     Diet:: Regular  Inadequate nutrition (GOAL):: No  Tube Feeding: No  Inadequate activity/exercise (GOAL):: No  Significant changes in sleep pattern (GOAL): No  Transportation means:: [employer] mobility     Buddhism or spiritual beliefs that impact treatment:: No  Mental health DX:: No  Mental health management concern (GOAL):: No  Chemical Dependency Status: No Current Concerns  Informal Support system:: Family, Parent   Socioeconomic History     Marital status: Single     Spouse name: Not on file     Number of children: o     Years of education: Not on file     Highest education level: Not on file   Occupational History     Employer: DISABLED   Relationships     Social connections     Talks on phone: Never     Gets together: More than three times a week     Attends Rastafarian service: Never     Active member of club or organization: No     Attends meetings of clubs or organizations: Never     Relationship status: Never      Intimate partner violence     Fear of current or ex partner: Not on file     Emotionally abused: Not on file     Physically abused: Not on file     Forced sexual activity: Not on file     Tobacco Use     Smoking status: Never Smoker     Smokeless tobacco: Never Used   Substance and Sexual Activity     Alcohol use: No     Alcohol/week: 0.0 standard drinks     Drug use: No     Sexual activity: Not Currently      Resources and Interventions:  Current Resources:    Community Resources: None  Supplies used at home:: None  Equipment Currently Used at Home: none  Employment Status: disabled)   )    Advance Care Plan/Directive  Advanced Care Plans/Directives on file:: No    Referrals Placed: None     Goals:   Goals         General    1. Psychosocial     Notes - Note edited  11/24/2020  4:13 PM by Summer Melton LGSW    Goal Statement: Within the next 2-3 months, I would like to follow medical recommendation of labs, flu shot, and prolia to support my health and wellbeing.   Date Goal Set: 11/24/2020  Barriers: Unable to get to medical appointments  Strengths: Motivated to address medical concerns  Date to Achieve By: 2/24/2020  Patient expressed understanding of goal: Ivette verbally stated understanding of goal    Action steps to achieve this goal:  1. Ivette will await resources from CC ROEL  2. Ivette will outreach to Care Coordination  for further questions or concerns        Patient/Caregiver understanding: Pt reports understanding and denies any additional questions or concerns at this times. SW CC engaged in AIDET communication during encounter.    Outreach Frequency: weekly    Plan: JUAN ANTONIO SEVILLA will outreach to visiting nurses of [place] to discuss services offered.    ROBERT Pineda, [facility]  Clinic Care Coordinator  [facility]  [phone]  [email or web address]    **Addendum     CC ROEL spoke with visiting nurses and they are unable to visit pt's in their homes at this time due to COVID.    Plan: JUAN ANTONIO SEVILLA will outreach to pt's mother to discuss this.    ROBERT Pineda, [facility]  Clinic Care Coordinator  [facility]  [phone]  [email or web address]

## 2020-11-24 NOTE — LETTER
[place] CARE COORDINATION  [address] [place] [address]    November 24, 2020    Ambrocio Delvalle  [address]  [facility] [address]      Dear Ambrocio,    I am a clinic care coordinator who works with Ezekiel Quevedo MD at [facility]. I wanted to thank you for spending the time to talk with me.  Below is a description of clinic care coordination and how I can further assist you.      The clinic care coordination team is made up of a registered nurse,  and community health worker who understand the health care system. The goal of clinic care coordination is to help you manage your health and improve access to the health care system in the most efficient manner. The team can assist you in meeting your health care goals by providing education, coordinating services, strengthening the communication among your providers and supporting you with any resource needs.    Please feel free to contact me at [phone] with any questions or concerns. We are focused on providing you with the highest-quality healthcare experience possible and that all starts with you.     Sincerely,     ROBERT Pineda, [facility]  Clinic Care Coordinator  [facility]  [phone]  [email or web address]    Enclosed: I have enclosed a copy of the Complex Care Plan. This has helpful information and goals that we have talked about. Please keep this in an easy to access place to use as needed.

## 2020-11-25 ENCOUNTER — PATIENT OUTREACH (OUTPATIENT)
Dept: NURSING | Facility: CLINIC | Age: 60
End: 2020-11-25
Payer: MEDICARE

## 2020-11-25 NOTE — PROGRESS NOTES
Clinic Care Coordination Contact    Follow Up Progress Note      Assessment: JUAN ANTONIO SEVILLA spoke with pt's mother regarding Visiting Nurses limitations at this time and inability to support with labs or injections.    Pt's mother will outreach to [facility] on Friday to set up an appointment for pt's labs.     She discussed her concern for pt when she is no longer able to care for him. She explained that she would be particularly worried during COVID because he would be unable to have a PCA safely right now.    Goals addressed this encounter:   Goals Addressed                 This Visit's Progress      1. Psychosocial   0%     Goal Statement: Within the next 2-3 months, I would like to follow medical recommendation of labs, flu shot, and prolia to support my health and wellbeing.   Date Goal Set: 11/24/2020  Barriers: Unable to get to medical appointments  Strengths: Motivated to address medical concerns  Date to Achieve By: 2/24/2020  Patient expressed understanding of goal: Ivette verbally stated understanding of goal    Action steps to achieve this goal:  1. Ivette will await resources from JUAN ANTONIO SEVILLA  2. Ivette will outreach to Care Coordination  for further questions or concerns        Plan: JUAN ANTONIO SEVILLA will outreach to pt's mother in 2 weeks to discuss pt's overall wellbeing and if labs were taken.    ROBERT Pineda, LGSW  Clinic Care Coordinator  [facility]  [phone]  [email or web address]

## 2020-12-02 ENCOUNTER — TELEPHONE (OUTPATIENT)
Dept: FAMILY MEDICINE | Facility: CLINIC | Age: 60
End: 2020-12-02

## 2020-12-02 NOTE — TELEPHONE ENCOUNTER
Forms from [facility] ICD-10 Dx Verification rec'd, completed, faxed and sent to scanning.  Copy in accordion file.     Elisa Omalley MA

## 2020-12-10 ENCOUNTER — PATIENT OUTREACH (OUTPATIENT)
Dept: CARE COORDINATION | Facility: CLINIC | Age: 60
End: 2020-12-10

## 2020-12-10 NOTE — PROGRESS NOTES
Clinic Care Coordination Contact  [facility] [[email or web address]]/Voicemail       Clinical Data: Care Coordinator Outreach    Outreach attempted x 1.  Left message on Pt's mother's voicemail with call back information and requested return call.    Plan: Care Coordinator will try to reach patient again in 3-5 business days.    ROBERT Pineda, [facility]  Clinic Care Coordinator  [facility]  [phone]  [email or web address]

## 2020-12-22 ENCOUNTER — PATIENT OUTREACH (OUTPATIENT)
Dept: NURSING | Facility: CLINIC | Age: 60
End: 2020-12-22
Payer: MEDICARE

## 2020-12-22 DIAGNOSIS — R30.0 DYSURIA: Primary | ICD-10-CM

## 2020-12-22 NOTE — PROGRESS NOTES
Clinic Care Coordination Contact    Follow Up Progress Note      Assessment: JUAN ANTONIO SEVILLA spoke with pt's mother. Lab draw has not been set up yet. She requests support in doing this.    She is worried that pt may have a UTI. She uses cottonelle wipes to clean him and she received notice that they had been recalled due to a bacteria being found in the wipes. [facility] can bring sample to the lab.    Goals addressed this encounter:   Goals Addressed                 This Visit's Progress      1. Psychosocial   0%     Goal Statement: Within the next 2-3 months, I would like to follow medical recommendation of labs, flu shot, and prolia to support my health and wellbeing.   Date Goal Set: 11/24/2020  Barriers: Unable to get to medical appointments  Strengths: Motivated to address medical concerns  Date to Achieve By: 2/24/2020  Patient expressed understanding of goal: Ivette verbally stated understanding of goal    Action steps to achieve this goal:  1. Ivette will await resources from JUAN ANTONIO SEVILLA  2. Ivette will outreach to Care Coordination  for further questions or concerns        Outreach Frequency: monthly    Plan: JUAN ANTONIO SEVILLA will request PCP to order UA. JUAN ANTONIO SEVILLA will request support from care team in faxing needed information to [facility].    Amie Melton [facility]  Clinic Care Coordinator  [facility] [place]  [facility]  [phone]  [email or web address]

## 2020-12-23 DIAGNOSIS — R25.2 SPASM: ICD-10-CM

## 2020-12-23 RX ORDER — DIAZEPAM 5 MG
TABLET ORAL
Qty: 30 TABLET | Refills: 0 | Status: SHIPPED | OUTPATIENT
Start: 2020-12-23 | End: 2021-03-02

## 2020-12-23 NOTE — TELEPHONE ENCOUNTER
Called Ambrocio    Symptoms: a little bit of burning, weak stream, a little bit of leakage, flank pain - left side started 3 weeks ago. Occasional sweating      Dark/concentrated urine, no visible blood. Cloudy urine - malodorous urine     Onset: 1 month ago when he used cottonelle wipes - lot number for this was recalled they received notice that it contained bacteria: Pluralibacter Gerggoviae    Not worsening, about the same     Denies: fever/chills, nausea/vomiting     He has difficulty getting in to the clinic     States Radha had recommended [facility] to him but they had not heard back yet from them      Please advise - pharmacy is pended     Routing History    Priority Sent On From To Message Type    12/22/2020  4:22 PM Ezekiel Quevedo MD [facility] Triage Patient Call    12/22/2020  3:12 PM Summer Melton, [facility] Ezekiel Quevedo MD Patient Call    Comment: Ivette is worried he may have a UTI due to using recalled Cottonelle wipes. Can you place a UA order for Ambrocio to have completed at the same time labs drawn in-home?     Thanks,   Amie       
Does he have symptoms?    Ezekiel Quevedo M.D.    
Left message asking patient to call back     Care Coordination: patient had stated you were looking to find a place to have lab checked. PCP has ordered urine lab test.     Please let us know if/how triage can assist. Pt cannot get in to the clinic for this    Thanks     Soha OBRIEN RN    
Nixon Quevedo M.D.    
Pt called back to clinic. RN unable to clarify questions.    Huddled with SW.  SW states [facility] needs the following info sent:    1) Pt demographics  2) Financial info (insurance)  3) Orders from PCP.    Printed and faxed to: [phone] as per ROEL.    Called to pt and updated.  No further action needed at this time.    
No

## 2020-12-23 NOTE — TELEPHONE ENCOUNTER
On phone regarding separate issue.  Needs refills for sertraline and diazepam.    Called to [facility]. Will refill sertraline.    Needs refill for diazepam.  Routing refill request to provider for review/approval because:  Drug not on the [facility] refill protocol     Controlled Substance Refill Request for diazepam (VALIUM) 5 MG tablet    Problem List Complete:  No     PROVIDER TO CONSIDER COMPLETION OF PROBLEM LIST AND OVERVIEW/CONTROLLED SUBSTANCE AGREEMENT    Last Written Prescription Date:  10/07/20  Last Fill Quantity: 30,   # refills: 0    THE MOST RECENT OFFICE VISIT MUST BE WITHIN THE PAST 3 MONTHS. AT LEAST ONE FACE TO FACE VISIT MUST OCCUR EVERY 6 MONTHS. ADDITIONAL VISITS CAN BE VIRTUAL.  (THIS STATEMENT SHOULD BE DELETED.)    Last Office Visit with [facility] primary care provider: 11/16/20 EULALIO Quevedo    Future Office visit:     Controlled substance agreement:   Encounter-Level CSA:    There are no encounter-level csa.     Patient-Level CSA:    There are no patient-level csa.         Last Urine Drug Screen: No results found for: CDAUT, No results found for: COMDAT, No results found for: THC13, PCP13, COC13, MAMP13, OPI13, AMP13, BZO13, TCA13, MTD13, BAR13, OXY13, PPX13, BUP13     Processing:  Rx to be electronically transmitted to pharmacy by provider      [email or web address]       checked in past 3 months?  No, route to RN  RN not a delegate to check  for this provider

## 2020-12-29 ENCOUNTER — HOSPITAL LABORATORY (OUTPATIENT)
Dept: OTHER | Facility: CLINIC | Age: 60
End: 2020-12-29

## 2020-12-29 LAB
ALBUMIN UR-MCNC: NEGATIVE MG/DL
APPEARANCE UR: CLEAR
BILIRUB UR QL STRIP: NEGATIVE
COLOR UR AUTO: YELLOW
GLUCOSE UR STRIP-MCNC: NEGATIVE MG/DL
HGB UR QL STRIP: ABNORMAL
KETONES UR STRIP-MCNC: NEGATIVE MG/DL
LEUKOCYTE ESTERASE UR QL STRIP: NEGATIVE
NITRATE UR QL: NEGATIVE
PH UR STRIP: 6.5 PH (ref 5–7)
RBC #/AREA URNS AUTO: <1 /HPF (ref 0–2)
SOURCE: ABNORMAL
SP GR UR STRIP: 1.02 (ref 1–1.03)
UROBILINOGEN UR STRIP-MCNC: 0.2 MG/DL (ref 0–2)
WBC #/AREA URNS AUTO: <1 /HPF (ref 0–5)

## 2020-12-30 LAB
BACTERIA SPEC CULT: NORMAL
Lab: NORMAL
SPECIMEN SOURCE: NORMAL

## 2020-12-31 NOTE — RESULT ENCOUNTER NOTE
José Miguel,    The urine culture shows you do not have a urine infection.  If you are having symptoms let me know.    You do have some blood in the urine.  I believe you had this in 2007 as well.  It would be prudent to have you seen by urology to check this out, not worried but just to be safe.  If you have someone great, if not let me know.    Happy New Year    Ezekiel Quevedo M.D.

## 2021-02-16 ENCOUNTER — PATIENT OUTREACH (OUTPATIENT)
Dept: CARE COORDINATION | Facility: CLINIC | Age: 61
End: 2021-02-16

## 2021-02-16 NOTE — PROGRESS NOTES
Clinic Care Coordination Contact  [facility]/Voicemail       Clinical Data: Care Coordinator Outreach  Outreach attempted x 2. Could not leave a voicemail message on patient's voicemail with call back information and requested return call.  Mailbox is full.    Plan: CHW will route chart to [facility] on 2nd attempt..    TRICIA Multani  Clinic Care Coordination  [facility]: [facility], [place], Women's, and [facility]  Phone: [phone]

## 2021-02-19 ENCOUNTER — PATIENT OUTREACH (OUTPATIENT)
Dept: NURSING | Facility: CLINIC | Age: 61
End: 2021-02-19
Payer: MEDICARE

## 2021-02-19 NOTE — PROGRESS NOTES
Clinic Care Coordination Contact    Follow Up Progress Note      Assessment: JUAN ANTONIO SEVILLA spoke with pt's mother about her significant frustration with her inability to get son a COVID vaccine because it is not yet available to him. Discussed that pt will be included in the next round after all 75+ year olds in [facility] system receive a vaccine, it is planned to move to 65+ year old and those with pre-existing conditions. Pt's mother feels that this is discrimination against people with disabilities. JUAN ANTONIO SEVILLA assured that this was not the case but at this time due to limited availability of vaccine the highest priority is to decrease death from COVID and the highest percentage of pt's dying at this time fall under 75+ year olds. Due to pt heightened risk, the vaccine will be available to him in the next round.    Pt's mother continued to express frustration and stated that she will be in contact with a  to discuss her claim that [facility] is discriminating against the elderly and disabled.    Pt's mother abruptly ended phone call.    Goals addressed this encounter:   Goals Addressed                 This Visit's Progress      COMPLETED: 1. Psychosocial        Goal Statement: Within the next 2-3 months, I would like to follow medical recommendation of labs, flu shot, and prolia to support my health and wellbeing.   Date Goal Set: 11/24/2020  Barriers: Unable to get to medical appointments  Strengths: Motivated to address medical concerns  Date to Achieve By: 2/24/2020  Patient expressed understanding of goal: Ivette verbally stated understanding of goal    Action steps to achieve this goal:  1. Ivette will await resources from JUAN ANTONIO SEVILLA  2. Ivette will outreach to Care Coordination  for further questions or concerns        Plan: JUAN ANTONIO SEVILLA will make no further outreaches at this time. JUAN ANTONIO SEVILLA provided contact information for future needs or concerns.    Amie Melton, [facility]  Clinic Care Coordinator  [facility] [place] [facility]  [facility] [place]  [facility]  [phone]  [email or web address]

## 2021-02-22 ENCOUNTER — TELEPHONE (OUTPATIENT)
Dept: FAMILY MEDICINE | Facility: CLINIC | Age: 61
End: 2021-02-22

## 2021-02-22 NOTE — TELEPHONE ENCOUNTER
"Called this pt's mother for follow up on encounter as she was getting vaccine tomorrow and wanted one for her son, and/or needs supervision for her son.  Is demanding vaccine for son; this pt.   They have had several calls today with SW, and other triage.  Again explained parameters set out by government, that nothing PCP can do about this.    Mother of pt states \"Well then I don't think Dr. Quevedo should be a doctor if he can't do anything about this.  Tell him I'm going to ana him if anything happens to my son because of this.  Don't tell me a doctor can't get my son a shot because he has more conditions than everyone in the nursing home.\"    Mother had pt get on phone to voice his concerns as well.    Son states:   \"I think that's ass backwards\"   \"I just think that an emergency exception could be made it he wanted to.\"    Mother said she was going to call a  about this, and that she was going to call the President.   Did offer to give information for patient relations, to have , or to set up virtual visit with Dr. Quevedo, all were declined.  Offered pt relations several times as continued verbalizing of dissatisfaction.    Stated \"I cannot get a covid19 vaccine scheduled for you, but is there anything else I can do for you at this time.\"    Pt Ambrocio said \"It doesn't sound like it.\"       Will notify clinic mgr.         "

## 2021-02-27 DIAGNOSIS — E78.5 HYPERLIPIDEMIA LDL GOAL <130: ICD-10-CM

## 2021-02-27 DIAGNOSIS — R25.2 SPASM: ICD-10-CM

## 2021-03-02 RX ORDER — DIAZEPAM 5 MG
TABLET ORAL
Qty: 30 TABLET | Refills: 0 | Status: SHIPPED | OUTPATIENT
Start: 2021-03-02 | End: 2021-03-11

## 2021-03-02 RX ORDER — ATORVASTATIN CALCIUM 20 MG
TABLET ORAL
Qty: 85 TABLET | Refills: 0 | Status: SHIPPED | OUTPATIENT
Start: 2021-03-02 | End: 2021-03-15

## 2021-03-11 ENCOUNTER — HOSPITAL ENCOUNTER (EMERGENCY)
Facility: CLINIC | Age: 61
Discharge: NURSING, REHAB OR LONG-TERM CARE | End: 2021-03-11
Attending: NURSE PRACTITIONER | Admitting: NURSE PRACTITIONER
Payer: MEDICARE

## 2021-03-11 VITALS
HEART RATE: 98 BPM | RESPIRATION RATE: 12 BRPM | TEMPERATURE: 97.8 F | SYSTOLIC BLOOD PRESSURE: 160 MMHG | OXYGEN SATURATION: 97 % | DIASTOLIC BLOOD PRESSURE: 99 MMHG

## 2021-03-11 DIAGNOSIS — R25.2 SPASM: ICD-10-CM

## 2021-03-11 DIAGNOSIS — Z78.9 UNABLE TO CARE FOR SELF: ICD-10-CM

## 2021-03-11 LAB
LABORATORY COMMENT REPORT: NORMAL
SARS-COV-2 RNA RESP QL NAA+PROBE: NEGATIVE
SPECIMEN SOURCE: NORMAL

## 2021-03-11 PROCEDURE — 87635 SARS-COV-2 COVID-19 AMP PRB: CPT | Performed by: NURSE PRACTITIONER

## 2021-03-11 PROCEDURE — G0378 HOSPITAL OBSERVATION PER HR: HCPCS

## 2021-03-11 PROCEDURE — 99285 EMERGENCY DEPT VISIT HI MDM: CPT

## 2021-03-11 PROCEDURE — C9803 HOPD COVID-19 SPEC COLLECT: HCPCS

## 2021-03-11 RX ORDER — DIAZEPAM 5 MG
TABLET ORAL
Qty: 15 TABLET | Refills: 0 | Status: SHIPPED | OUTPATIENT
Start: 2021-03-11 | End: 2021-04-23

## 2021-03-11 RX ORDER — IBUPROFEN 200 MG
400 TABLET ORAL EVERY 8 HOURS PRN
COMMUNITY
End: 2023-07-29

## 2021-03-11 ASSESSMENT — ENCOUNTER SYMPTOMS
ABDOMINAL PAIN: 0
HEADACHES: 0
SHORTNESS OF BREATH: 0

## 2021-03-11 NOTE — ED PROVIDER NOTES
History     Chief Complaint:  Social Work Services       The history is provided by the patient.     Ambrocio Delvalle is a 60 year old male with a history of cerebral palsy with chronic wheelchair dependence, hypertension, depression, chronic back pain, and hypercholesterolemia among others who presents for a social work evaluation. The patient lives at home with his mother, who helps take care of him. His mother is requiring hospital admission today and, since his mother does much of his care and he is unable to independently care for himself, he is seeking a social admission today.     He has no shortness of breath, headache, abdominal pain, or other symptoms.  He denies any signs of illness or trauma.  He uses a wheelchair and does not walk. For transfer, he uses an at home lift that his mother assists with. When his mother has previously been hospitalized, he has had neighbors care for him, though no one has been able to help care for him today. He does not have in-home care and has, for the most part, not left his home for a year due to the COVID pandemic.     Review of Systems   Respiratory: Negative for shortness of breath.    Gastrointestinal: Negative for abdominal pain.   Neurological: Negative for headaches.   All other systems reviewed and are negative.    Allergies:  Penicillins  Atorvastatin  Simvastatin      Medications:   Baclofen   Cetirizine  Denosumab  Diazepam   Flonase  Lipitor  Potassium chloride  Rosuvastatin  Sertraline  Triamterene-Hydrochlorothiazide     Medical History:   Cerebral palsy   Depression   Hypercholesterolemia   Hypertension   Lumbar scoliosis   Lung nodule  Nephrolithiasis  Osteoporosis  Pulmonary hypertension   Reflex sympathetic dystrophy of lower extremity   Right ventricular hypertrophy   Urethral stricture  Vitamin D deficiency   Hyperlipidemia   Excessive sweating   Chronic midline low back pain without sciatica   Complex regional pain syndrome type I - bilateral lower  extremities    Surgical History:  Lithotripsy, ureteral widening  Left knee procedure, patella lowered    Family History:   Father -  Prostate cancer    Social History:  The patient was accompanied to the ED by EMS.  He lives with his mother.   PCP: Ezekiel Quevedo        Physical Exam     Patient Vitals for the past 24 hrs:   BP Temp Temp src Pulse Resp SpO2   03/11/21 1419 (!) 160/99 97.8  F (36.6  C) Temporal 98 12 97 %          Physical Exam  Nursing notes reviewed. Vitals reviewed.  General: Alert. Well kept.  Eyes:  Conjunctiva non-injected, non-icteric.  Neck/Throat: Moist mucous membranes.  Normal voice.  Cardiac: Regular rhythm. Normal heart sounds with no murmur/rubs/click.   Pulmonary: Clear and equal breath sounds bilaterally. No crackles/rales. No wheezing  Abdomen: Soft. Non-distended. Non-tender to palpation. No masses. No guarding or rebound.  Musculoskeletal: Lower extremity atrophy and weakness bilateral and spasms with movement.  Limited upper arm range of motion.    Neurological: Alert and oriented x person, place, situation.   Skin: Warm and dry without rashes or petechiae. Normal appearance of visualized exposed skin.  Psych: Affect normal. Good eye contact.      Emergency Department Course     Laboratory:  Asymptomatic COVID-19 (Coronavirus) PCR by Nasopharyngeal Swab: Negative     Emergency Department Course:    Reviewed:  I reviewed the patient's nursing notes, vitals, past medical records, Care Everywhere.     Assessments:  1344 I performed an exam of the patient, as documented above.    The ED case manager was consulted by the nurse regarding available placement.     Consults:   1425 I spoke with Dr. Pizarro of the hospitalist service regarding patient's presentation, findings, and plan of care. They are in agreement to accept the patient for admission to a bed for further monitoring, care, and treatment.    1546 I spoke with Tanja, the , who has found a placement for the  patient at [facility] in [place]. He will be discharged to here instead of hospital admission.  The patient and his mother are in agreement with this plan.    Disposition:  The patient was transferred to [facility] via EMS.    Impression & Plan     Medical Decision Making:  Ambrocio Delvalle is a 60 year old male with a history of cerebral palsy, hypertension, depression, chronic back pain, and hypercholesterolemia among others who presents for a social work evaluation.  On exam the patient is oriented to person, place, situation.  He denies signs of illness such as fever, headache, shortness of breath, chest pain, GI symptoms.  He notes his mother is his primary caretaker and he does require use of a mechanical lift and wheelchair.  In the past he has been able to have a neighbor at the respite care but this neighbor has moved and they do not have other family or neighbors who can provide respite care.   Tanja was consulted and initially noted there was no placement available for him.  I did speak with Dr. Pizarro, Hospitalist, who accepted the patient for observation admission but placement at [facility] was able to be obtained by the  Tanja.  The patient and his mother are both in agreement with this placement at [facility] until his mother is out of the hospital.  The patient does request to return home when his mother returns home.  No indication for further work-up today as patient denies any symptoms.  Covid testing is negative.  The patient was transferred to [facility] via EMS.    Covid-19  Ambrocio Delvalle was evaluated during a global COVID-19 pandemic, which necessitated consideration that the patient might be at risk for infection with the SARS-CoV-2 virus that causes COVID-19.   Applicable protocols for evaluation were followed during the patient's care.   COVID-19 was considered as part of the patient's evaluation. The plan for testing  is:  a test was obtained during this visit.     Diagnosis:     ICD-10-CM    1. Unable to care for self  Z78.9 Asymptomatic SARS-CoV-2 COVID-19 Virus (Coronavirus) by PCR   2. Spasm  R25.2 diazepam (VALIUM) 5 MG tablet        Scribe Disclosure:  I, Deja Mcgrath, am serving as a scribe at 1:43 PM on 3/11/2021 to document services personally performed by Buffy Salvador, IVA based on my observations and the provider's statements to me.      Buffy Salvador, CNP  03/11/21 3970

## 2021-03-11 NOTE — PROGRESS NOTES
RECEIVING UNIT ED HANDOFF REVIEW    ED Nurse Handoff Report was reviewed by: Demarco Galvin RN on March 11, 2021 at 3:39 PM

## 2021-03-11 NOTE — ED NOTES
[facility]  ED Nurse Handoff Report    ED Chief complaint: Social Work Services      ED Diagnosis:   Final diagnoses:   None       Code Status: Full Code    Allergies:   Allergies   Allergen Reactions     Penicillins Hives     Atorvastatin      Muscle aches to generic     Simvastatin      Muscle aches       Patient Story: Pt Presents with mother. Mother is being admitted to hospital. Mother is pruimary caregiver for patient. Pt unable to care for self due to CP. No medical complaints  Focused Assessment:  WC/BEd bound due to CP    Treatments and/or interventions provided: Covid test  Patient's response to treatments and/or interventions: Unchanged    To be done/followed up on inpatient unit: Case management/     Does this patient have any cognitive concerns?: None    Activity level - Baseline/Home:  Wheelchair  Activity Level - Current:   Wheelchair    Patient's Preferred language: English   Needed?: No    Isolation: None  Infection: Not Applicable  Patient tested for COVID 19 prior to admission: YES  Bariatric?: No    Vital Signs:   Vitals:    03/11/21 1419   BP: (!) 160/99   Pulse: 98   Resp: 12   Temp: 97.8  F (36.6  C)   TempSrc: Temporal   SpO2: 97%       Cardiac Rhythm:     Was the PSS-3 completed:   Yes  What interventions are required if any?               Family Comments: Mother in hospital  OBS brochure/video discussed/provided to patient/family: Yes              Name of person given brochure if not patient: NA              Relationship to patient: NA    For the majority of the shift this patient's behavior was Green.   Behavioral interventions performed were NA.    ED NURSE PHONE NUMBER: [phone]     \

## 2021-03-11 NOTE — PHARMACY-ADMISSION MEDICATION HISTORY
Pharmacy Medication History  Admission medication history interview status for the 3/11/2021  admission is complete. See EPIC admission navigator for prior to admission medications     Location of Interview: Phone  Medication history sources: Patient's family/friend (mother), Surescripts and clinic notes    Significant changes made to the medication list:  Added vit d frequency  Changed ibuprofen dose  Flagged crestor for removal. From clinic notes, it was tried to qualify pt for lipitor brand name use    In the past week, patient estimated taking medication this percent of the time: 50-90% due to mother's illness    Additional medication history information:   Patient's mother sets up his meds for him, but the patient takes on his own  prolia injection is due. It has been over 6 months    Medication reconciliation completed by provider prior to medication history? No      Time spent in this activity: 15 minutes    Prior to Admission medications    Medication Sig Last Dose Taking? Auth Provider   baclofen (LIORESAL) 20 MG tablet Take 1 tablet (20 mg) by mouth 5 times daily Past Week at Unknown time Yes Ezekile Quevedo MD   cetirizine (ZYRTEC) 10 MG tablet Take 10 mg by mouth daily Past Week at Unknown time Yes Reported, Patient   Cholecalciferol (VITAMIN D PO) Take by mouth daily  Past Week at Unknown time Yes Reported, Patient   diazepam (VALIUM) 5 MG tablet TAKE ONE TABLET BY MOUTH NIGHTLY AS NEEDED prn Yes Mary Park PA-C   FLONASE 50 MCG/ACT nasal spray Spray 1-2 sprays into both nostrils daily  Patient taking differently: Spray 1-2 sprays into both nostrils daily as needed for rhinitis  prn Yes Ezekiel Quevedo MD   ibuprofen (ADVIL/MOTRIN) 200 MG tablet Take 400 mg by mouth every 8 hours as needed for mild pain prn Yes Unknown, Entered By History   LIPITOR 20 MG tablet TAKE 1 TABLET (20 MG) BY MOUTH DAILY *PFIZER BRAND* Past Week at Unknown time Yes Mary Park PA-C   potassium chloride  ER (KLOR-CON) 8 MEQ CR tablet TAKE 2 TABLETS (16 MEQ) BY MOUTH DAILY *PERRIGO BRAND* Past Week at Unknown time Yes Ezekiel Quevedo MD   sertraline (ZOLOFT) 100 MG tablet TAKE 2 TABLETS BY MOUTH DAILY *NORTHSTAR BRAND* Past Week at Unknown time Yes Ezekiel Quevedo MD   triamterene-HCTZ (DYAZIDE) 37.5-25 MG capsule Take 1 capsule by mouth every morning Past Week at Unknown time Yes Ezekiel Quevedo MD   denosumab (PROLIA) 60 MG/ML SOSY injection Inject 1 mL (60 mg) Subcutaneous every 6 months More than a month at Unknown time  Kurtis Gale MD   order for DME Equipment being ordered: Wheelchair    Power wheelchair with tilt and recline function   Ezekiel Quevedo MD   ORDER FOR DME Equipment being ordered: Hospital Bed   Ezekiel Quevedo MD   ORDER FOR DME Equipment being ordered: Wheelchair   Ezekiel Quevedo MD   rosuvastatin (CRESTOR) 10 MG tablet Take 1 tablet (10 mg) by mouth daily   Ezekiel Quevedo MD       The information provided in this note is only as accurate as the sources available at the time of update(s)

## 2021-03-11 NOTE — ED NOTES
Bed: ED13  Expected date:   Expected time:   Means of arrival:   Comments:  Ambrocio Delvalle  May need to be seen...

## 2021-03-11 NOTE — ED NOTES
I was asked to assist this patient with placement as he is bed bound at home and his mother who cares for him is being admitted to the hospital.  I contacted [facility] in [facility] and they assessed and accepted this patient into their LTC.  They understand that this patient with Cerebral Palsy, is bed bound will need care while his mother is in the hospital with a goal of going home.  [facility] did accept this patient.  I faxed the orders, med list, script and PAS to [facility].  I made a packet of the paperwork and will ask the bedside RN to send it with this patient.  I spoke with this patients mother earlier and she was in agreement to have this patient placed. I called the charge nurse on station 55 and asked that she give the mother the location and phone number of [facility] in [facility] so she can call and check on him.  I spoke with this patient and informed him, that his mother knows and agrees that he go to a facility for care while she is in the hospital. I did mention to this patient that he could possibly stay there instead of going home.  He said he wants to go home with his mother when she is better.  This patient stated that his mothers sister Pat lives in [place].  This patient understands he is going to a facility for now.  I updated the ER staff of the above plan. I have answered all of the questions.  I have completed this consult.    PAS-RR ?   D: Per DHS regulation, ROEL completed and submitted PAS-RR to [place] Board on Aging Direct Connect via the Senior LinkAge Line. PAS-RR confirmation # is :[id number]   I: CC spoke with patient/mother and they are aware a PAS-RR has been submitted. CC reviewed with patient/mother that they may be contacted for a follow up appointment within 10 days of hospital discharge if their SNF stay is < 30 days. Contact information for [facility] LinkAge Line was also provided. ?   A: Patient/mother verbalized understanding.   P: Further questions may be directed to [facility] LinkAge Line at  #[phone], option #4 for PAS-RR staff.

## 2021-03-12 ENCOUNTER — NURSING HOME VISIT (OUTPATIENT)
Dept: GERIATRICS | Facility: CLINIC | Age: 61
End: 2021-03-12
Payer: MEDICARE

## 2021-03-12 VITALS
WEIGHT: 170 LBS | SYSTOLIC BLOOD PRESSURE: 175 MMHG | TEMPERATURE: 97.8 F | HEIGHT: 62 IN | OXYGEN SATURATION: 97 % | HEART RATE: 91 BPM | DIASTOLIC BLOOD PRESSURE: 104 MMHG | BODY MASS INDEX: 31.28 KG/M2 | RESPIRATION RATE: 18 BRPM

## 2021-03-12 DIAGNOSIS — G89.29 CHRONIC MIDLINE LOW BACK PAIN WITHOUT SCIATICA: ICD-10-CM

## 2021-03-12 DIAGNOSIS — M54.50 CHRONIC MIDLINE LOW BACK PAIN WITHOUT SCIATICA: ICD-10-CM

## 2021-03-12 DIAGNOSIS — I10 ESSENTIAL HYPERTENSION: ICD-10-CM

## 2021-03-12 DIAGNOSIS — G80.1 SPASTIC DIPLEGIC CEREBRAL PALSY (H): Primary | ICD-10-CM

## 2021-03-12 PROCEDURE — 99309 SBSQ NF CARE MODERATE MDM 30: CPT | Performed by: NURSE PRACTITIONER

## 2021-03-12 ASSESSMENT — MIFFLIN-ST. JEOR: SCORE: 1460.36

## 2021-03-12 NOTE — LETTER
3/12/2021        RE: Ambrocio Delvalle  [address]  [facility] [address]        [facility]  PRIMARY CARE PROVIDER AND CLINIC:  Ezekiel Quevedo MD, [address] [facility] [address] / [place] [address]  Chief Complaint   Patient presents with     [facility] F/U     [facility] Medical Record Number:  [id number]  Place of Service where encounter took place:  [facility] [[id number]]    Ambrocio Delvalle  is a 60 year old  (1960), admitted to the above facility from  [facility]. Hospital stay 3/11/21 through 4 hour ER visit..  Admitted to this facility for respite care due to his caregiver being admitted to the hospital.    HPI:    HPI information obtained from: facility chart records, facility staff, patient report and [facility] chart review.   Brief Summary of Hospital Course: Patient's mother is his caregiver and she was admitted to the hospital, so patient presented to ED for placement.    Updates on Status Since Skilled nursing Admission:   Patient reports no acute concerns. At home he uses a mechanical lift and electric chair. He does not have his chair here. He says that his mom was diagnosed with kidney stones. Plan is for him to return home when his mom is able to care for him again. BP has been 150-170s/100s since admission    CODE STATUS/ADVANCE DIRECTIVES DISCUSSION:   CPR/Full code   Patient's living condition: lives with family, mom   ALLERGIES: Penicillins, Atorvastatin, and Simvastatin  PAST MEDICAL HISTORY:  has a past medical history of Cerebral palsy (H), Depression, major, in partial remission (H), High cholesterol, Hypertension, Low HDL (under 40), Lumbar scoliosis, Lung nodule (2002), Microhematuria (2007), Nasal congestion, Nephrolithiasis (1986), Osteoporosis (2006, fu 2010), Pharyngitis (2002), Pulmonary HTN (H) (2008), Reflex sympathetic dystrophy of lower extremity, Right ventricular hypertrophy (2008 on echo), Sweats,  sweating, excessive (2008), Urethral stricture (2007), and Vitamin D deficiency.  PAST SURGICAL HISTORY:   has a past surgical history that includes orthopedic surgery and Abdomen surgery.  FAMILY HISTORY: family history includes Prostate Cancer in his father.  SOCIAL HISTORY:   reports that he has never smoked. He has never used smokeless tobacco. He reports that he does not drink alcohol or use drugs.    Post Discharge Medication Reconciliation Status: discharge medications reconciled, continue medications without change    Current Outpatient Medications   Medication Sig Dispense Refill     baclofen (LIORESAL) 20 MG tablet Take 1 tablet (20 mg) by mouth 5 times daily 450 tablet 3     cetirizine (ZYRTEC) 10 MG tablet Take 10 mg by mouth daily       diazepam (VALIUM) 5 MG tablet One tablet nightly as needed 15 tablet 0     FLONASE 50 MCG/ACT nasal spray Spray 1 spray into both nostrils daily 16 g 5     ibuprofen (ADVIL/MOTRIN) 200 MG tablet Take 400 mg by mouth every 8 hours as needed for mild pain       LIPITOR 20 MG tablet TAKE 1 TABLET (20 MG) BY MOUTH DAILY *[employer] BRAND* 85 tablet 0     order for DME Equipment being ordered: Wheelchair    Power wheelchair with tilt and recline function 1 Units 0     ORDER FOR DME Equipment being ordered: Hospital Bed 1 Units 0     ORDER FOR DME Equipment being ordered: Wheelchair 1 Units 0     potassium chloride ER (KLOR-CON) 8 MEQ CR tablet TAKE 2 TABLETS (16 MEQ) BY MOUTH DAILY *PERRIGO BRAND* 180 tablet 3     sertraline (ZOLOFT) 100 MG tablet TAKE 2 TABLETS BY MOUTH DAILY *NORTHSTAR BRAND* 180 tablet 3     triamterene-HCTZ (DYAZIDE) 37.5-25 MG capsule Take 1 capsule by mouth every morning 90 capsule 3     denosumab (PROLIA) 60 MG/ML SOSY injection Inject 1 mL (60 mg) Subcutaneous every 6 months 1 mL 1       ROS:  10 point ROS of systems including Constitutional, Eyes, Respiratory, Cardiovascular, Gastroenterology, Genitourinary, Integumentary, Musculoskeletal, Psychiatric  "were all negative except for pertinent positives noted in my HPI.    Vitals:  BP (!) 175/104   Pulse 91   Temp 97.8  F (36.6  C)   Resp 18   Ht 1.575 m (5' 2\")   Wt 77.1 kg (170 lb)   SpO2 97%   BMI 31.09 kg/m    Exam:  GENERAL APPEARANCE:  Alert, in no distress, oriented  ENT:  Mouth and posterior oropharynx normal, moist mucous membranes, normal hearing acuity  RESP:  lungs clear to auscultation , no respiratory distress  ABDOMEN:  normal bowel sounds, soft, nontender, no hepatosplenomegaly or other masses  M/S:   contractures of hands, no purposeful movement of BLE  PSYCH:  oriented X 3, normal insight, judgement and memory    Lab/Diagnostic data:  Recent labs in [facility] reviewed by me today.     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 Requires 24 hour care due to functional impairment. Will ask OT to evaluate for chair so he is not bedridden while here. No current issues with spasms  Plan: Continue current POC with no changes at this time and adjustments as needed.    (I10) Essential hypertension  Comment: BP has been quite elevated since admission, but this may be related to anxiety about his mom, move to the facility. Will monitor for now.   Plan: Monitor BP    (M54.5,  G89.29) Chronic midline low back pain without sciatica  Comment: Noted in history, no acute concerns  Plan: Continue current POC with no changes at this time and adjustments as needed.      Electronically signed by:  GEOVANI Bowen [facility]  Phone: [phone]                                "

## 2021-03-12 NOTE — PROGRESS NOTES
[facility]  PRIMARY CARE PROVIDER AND CLINIC:  Ezekiel Quevedo MD, [address] / [place] [address]  Chief Complaint   Patient presents with     [facility] F/U     [facility] Record Number:  [id number]  Place of Service where encounter took place:  [facility] (U) [[id number]]    Ambrocio Delvalle  is a 60 year old  (1960), admitted to the above facility from  [facility]. Hospital stay 3/11/21 through 4 hour ER visit..  Admitted to this facility for respite care due to his caregiver being admitted to the hospital.    HPI:    HPI information obtained from: facility chart records, facility staff, patient report and [facility] chart review.   Brief Summary of Hospital Course: Patient's mother is his caregiver and she was admitted to the hospital, so patient presented to ED for placement.    Updates on Status Since Skilled nursing Admission:   Patient reports no acute concerns. At home he uses a mechanical lift and electric chair. He does not have his chair here. He says that his mom was diagnosed with kidney stones. Plan is for him to return home when his mom is able to care for him again. BP has been 150-170s/100s since admission    CODE STATUS/ADVANCE DIRECTIVES DISCUSSION:   CPR/Full code   Patient's living condition: lives with family, mom   ALLERGIES: Penicillins, Atorvastatin, and Simvastatin  PAST MEDICAL HISTORY:  has a past medical history of Cerebral palsy (H), Depression, major, in partial remission (H), High cholesterol, Hypertension, Low HDL (under 40), Lumbar scoliosis, Lung nodule (2002), Microhematuria (2007), Nasal congestion, Nephrolithiasis (1986), Osteoporosis (2006, fu 2010), Pharyngitis (2002), Pulmonary HTN (H) (2008), Reflex sympathetic dystrophy of lower extremity, Right ventricular hypertrophy (2008 on echo), Sweats, sweating, excessive (2008), Urethral stricture (2007), and Vitamin D deficiency.  PAST SURGICAL  HISTORY:   has a past surgical history that includes orthopedic surgery and Abdomen surgery.  FAMILY HISTORY: family history includes Prostate Cancer in his father.  SOCIAL HISTORY:   reports that he has never smoked. He has never used smokeless tobacco. He reports that he does not drink alcohol or use drugs.    Post Discharge Medication Reconciliation Status: discharge medications reconciled, continue medications without change    Current Outpatient Medications   Medication Sig Dispense Refill     baclofen (LIORESAL) 20 MG tablet Take 1 tablet (20 mg) by mouth 5 times daily 450 tablet 3     cetirizine (ZYRTEC) 10 MG tablet Take 10 mg by mouth daily       diazepam (VALIUM) 5 MG tablet One tablet nightly as needed 15 tablet 0     FLONASE 50 MCG/ACT nasal spray Spray 1 spray into both nostrils daily 16 g 5     ibuprofen (ADVIL/MOTRIN) 200 MG tablet Take 400 mg by mouth every 8 hours as needed for mild pain       LIPITOR 20 MG tablet TAKE 1 TABLET (20 MG) BY MOUTH DAILY *[employer] BRAND* 85 tablet 0     order for DME Equipment being ordered: Wheelchair    Power wheelchair with tilt and recline function 1 Units 0     ORDER FOR DME Equipment being ordered: Hospital Bed 1 Units 0     ORDER FOR DME Equipment being ordered: Wheelchair 1 Units 0     potassium chloride ER (KLOR-CON) 8 MEQ CR tablet TAKE 2 TABLETS (16 MEQ) BY MOUTH DAILY *PERRIGO BRAND* 180 tablet 3     sertraline (ZOLOFT) 100 MG tablet TAKE 2 TABLETS BY MOUTH DAILY *NORTHSTAR BRAND* 180 tablet 3     triamterene-HCTZ (DYAZIDE) 37.5-25 MG capsule Take 1 capsule by mouth every morning 90 capsule 3     denosumab (PROLIA) 60 MG/ML SOSY injection Inject 1 mL (60 mg) Subcutaneous every 6 months 1 mL 1       ROS:  10 point ROS of systems including Constitutional, Eyes, Respiratory, Cardiovascular, Gastroenterology, Genitourinary, Integumentary, Musculoskeletal, Psychiatric were all negative except for pertinent positives noted in my HPI.    Vitals:  BP (!) 175/104    "Pulse 91   Temp 97.8  F (36.6  C)   Resp 18   Ht 1.575 m (5' 2\")   Wt 77.1 kg (170 lb)   SpO2 97%   BMI 31.09 kg/m    Exam:  GENERAL APPEARANCE:  Alert, in no distress, oriented  ENT:  Mouth and posterior oropharynx normal, moist mucous membranes, normal hearing acuity  RESP:  lungs clear to auscultation , no respiratory distress  ABDOMEN:  normal bowel sounds, soft, nontender, no hepatosplenomegaly or other masses  M/S:   contractures of hands, no purposeful movement of BLE  PSYCH:  oriented X 3, normal insight, judgement and memory    Lab/Diagnostic data:  Recent labs in [facility] reviewed by me today.     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 Requires 24 hour care due to functional impairment. Will ask OT to evaluate for chair so he is not bedridden while here. No current issues with spasms  Plan: Continue current POC with no changes at this time and adjustments as needed.    (I10) Essential hypertension  Comment: BP has been quite elevated since admission, but this may be related to anxiety about his mom, move to the facility. Will monitor for now.   Plan: Monitor BP    (M54.5,  G89.29) Chronic midline low back pain without sciatica  Comment: Noted in history, no acute concerns  Plan: Continue current POC with no changes at this time and adjustments as needed.      Electronically signed by:  GEOVANI Bowen [facility]  Phone: [phone]                      "

## 2021-03-15 DIAGNOSIS — M62.838 MUSCLE SPASM: ICD-10-CM

## 2021-03-15 DIAGNOSIS — E78.5 HYPERLIPIDEMIA LDL GOAL <130: ICD-10-CM

## 2021-03-15 DIAGNOSIS — I10 BENIGN ESSENTIAL HYPERTENSION: ICD-10-CM

## 2021-03-15 DIAGNOSIS — F33.41 RECURRENT MAJOR DEPRESSIVE DISORDER, IN PARTIAL REMISSION (H): ICD-10-CM

## 2021-03-15 RX ORDER — POTASSIUM CHLORIDE 600 MG/1
TABLET, FILM COATED, EXTENDED RELEASE ORAL
Qty: 180 TABLET | Refills: 0 | Status: SHIPPED | OUTPATIENT
Start: 2021-03-15 | End: 2021-07-19

## 2021-03-15 RX ORDER — SERTRALINE HYDROCHLORIDE 100 MG/1
TABLET, FILM COATED ORAL
Qty: 180 TABLET | Refills: 0 | Status: SHIPPED | OUTPATIENT
Start: 2021-03-15 | End: 2021-07-19

## 2021-03-15 RX ORDER — ATORVASTATIN CALCIUM 20 MG
TABLET ORAL
Qty: 85 TABLET | Refills: 0 | Status: SHIPPED | OUTPATIENT
Start: 2021-03-15 | End: 2021-07-19

## 2021-03-15 NOTE — TELEPHONE ENCOUNTER
"Spoke to pt's mother for an ERIP for her, but wanted to report the following about this pt.  Also requesting refills to his pharmacy and reports that she never received pill bottles back for pt after the ambulance took them.   Pended scripts for review.    Pt mother called, and reported concern for pt.    In mother's words   \"I went to [facility], and they sent my son José Miguel; who needs care to [facility].  They said he could stay by me in the hospital, then they sent him to [facility] without telling me.  They confused him. They did not take good care of him. When I tried to get him home [facility] would not cooperate with me to get him home.  [facility] wouldn't release him, and wanted to keep him there.  They told him he could stay there and did not have to go home.  José Miguel could not walk, did not have his wheelchair, they didn't let him up to eat, they did not answer call button when he needed to use the urinal, they were beligerent, they made him eat food he was allergic to. I can't believe people treated José Miguel and I so badly.  I couldn't get him home, They wouldn't help me get transportation to get him home, or release him. I had to ask my neighbors to help me get him home.\"     Son did make affirmations of agreement in background, but hard to hear.     Encouraged pt and mom to call pt relations with concerns, and will forward this as appropriate.    Blanquita Hercules, RN  [facility] RN Triage Team      "

## 2021-03-18 ENCOUNTER — TELEPHONE (OUTPATIENT)
Dept: FAMILY MEDICINE | Facility: CLINIC | Age: 61
End: 2021-03-18

## 2021-03-18 NOTE — TELEPHONE ENCOUNTER
Prior Authorization Retail Medication Request    Medication/Dose: Lipitor 20 mg  ICD code (if different than what is on RX):  E78.5  Previously Tried and Failed:  Atorvastatin, Simvastatin, Rosuvastatin  Rationale:  Patient developed msucle cramps while on generic Atorvastatin and has had no side effects since switching to brand name    Insurance Name:  [facility]  Insurance ID:  [id number]      Pharmacy Information (if different than what is on RX)  Name:  [facility]  Phone:  [phone]

## 2021-03-18 NOTE — TELEPHONE ENCOUNTER
Prior Authorization Approval    Authorization Effective Date: 3/18/2021  Authorization Expiration Date: 12/31/2021  Medication: Lipitor 20 mg  Approved Dose/Quantity:    Reference #:     Insurance Company: [employer] - Phone [phone] Fax [phone]  Expected CoPay:       CoPay Card Available:      Foundation Assistance Needed:    Which Pharmacy is filling the prescription (Not needed for infusion/clinic administered): [place], [place] - [address] [place] [address]  Pharmacy Notified: Yes  Patient Notified: Yes  **Instructed pharmacy to notify patient when script is ready to /ship.**

## 2021-03-18 NOTE — TELEPHONE ENCOUNTER
Central Prior Authorization Team   Phone: [phone]      PA Initiation    Medication: Lipitor 20 mg  Insurance Company: [employer] - Phone [phone] Fax [phone]  Pharmacy Filling the Rx: [facility] - [place], [place] - [address] [place] [address]  Filling Pharmacy Phone: [phone]  Filling Pharmacy Fax:    Start Date: 3/18/2021

## 2021-03-26 ENCOUNTER — VIRTUAL VISIT (OUTPATIENT)
Dept: FAMILY MEDICINE | Facility: CLINIC | Age: 61
End: 2021-03-26
Payer: MEDICARE

## 2021-03-26 DIAGNOSIS — M62.81 GENERALIZED MUSCLE WEAKNESS: Primary | ICD-10-CM

## 2021-03-26 PROCEDURE — 99214 OFFICE O/P EST MOD 30 MIN: CPT | Mod: 95 | Performed by: INTERNAL MEDICINE

## 2021-03-26 NOTE — PROGRESS NOTES
Ambrocio is a 60 year old who is being evaluated via a billable video visit.      How would you like to obtain your AVS? Mail a copy  If the video visit is dropped, the invitation should be resent by: Text to cell phone: [phone]  Will anyone else be joining your video visit? No      Video Start Time: 2:00 PM      Subjective   Ambrocio is a 60 year old who presents for the following health issues    Starting Tuesday patient was having n/v, could not keep food down.  Also having phlegm and choking on that.  Last night ate a little bit but that was the first time. He has been light headed, pale and sweating.  He has no energy.  Has not been taking temp but mother feels he is warm.  Today he is coughing and has not eaten today.  He is drinking but that can come up too.  No abdomen pain, no chest pain, no shortness of breath x when coughs.    He has had a loss of appetite for over a week prior.  He has had a nocturnal cough for over a year.  Has had the phlegm issue for over a year, felt to be black mold.    Past Medical History:   Diagnosis Date     Cerebral palsy (H)      Depression, major, in partial remission (H)      High cholesterol      Hypertension      Low HDL (under 40)      Lumbar scoliosis      Lung nodule 2002    fu benign     Microhematuria 2007    ct neg cysto inflammation     Nasal congestion      Nephrolithiasis 1986    urol eval Dr. Beverly     Osteoporosis 2006, fu 2010    dexa 11/10 -1.5 fem neck and -2.9 total hip; fu 2016 and worse, -4.0 hip; seen by Dr. Gale 10/19 and started prolia     Pharyngitis 2002    hosp fsd     Pulmonary HTN (H) 2008    seen on echo, fu 12/16 not seen, nl ef, grade 1 dd     Reflex sympathetic dystrophy of lower extremity      Right ventricular hypertrophy 2008 on echo    ?due to sandie, had fu with Dr. Gaitan, never did sleep study, fu echo 12/16 no rvh seen     Sweats, sweating, excessive 2008    echo with rvh and pulm htn, ct chest, abd and pelvis with liver cysts and old  lung nodule, cosyntropin stim test neg     Urethral stricture 2007    had cysto and ct done for hematuria as well     Vitamin D deficiency      Past Surgical History:   Procedure Laterality Date     GENITOURINARY SURGERY      lithortipsy,  ureteral widened     ORTHOPEDIC SURGERY      left knee- abductors lengthed heel cords lengthened, tendons trransferred , patella lowered,     Social History     Socioeconomic History     Marital status: Single     Spouse name: Not on file     Number of children: o     Years of education: Not on file     Highest education level: Not on file   Occupational History     Employer: DISABLED   Social Needs     Financial resource strain: Not hard at all     Food insecurity     Worry: Never true     Inability: Never true     Transportation needs     Medical: No     Non-medical: No   Tobacco Use     Smoking status: Never Smoker     Smokeless tobacco: Never Used   Substance and Sexual Activity     Alcohol use: No     Alcohol/week: 0.0 standard drinks     Drug use: No     Sexual activity: Not Currently   Lifestyle     Physical activity     Days per week: Not on file     Minutes per session: Not on file     Stress: Not at all   Relationships     Social connections     Talks on phone: Never     Gets together: More than three times a week     Attends Baptism service: Never     Active member of club or organization: No     Attends meetings of clubs or organizations: Never     Relationship status: Never      Intimate partner violence     Fear of current or ex partner: Not on file     Emotionally abused: Not on file     Physically abused: Not on file     Forced sexual activity: Not on file   Other Topics Concern     Not on file   Social History Narrative     Not on file     Current Outpatient Medications   Medication Sig Dispense Refill     baclofen (LIORESAL) 20 MG tablet Take 1 tablet (20 mg) by mouth 5 times daily (Patient not taking: Reported on 3/26/2021) 450 tablet 3     cetirizine  (ZYRTEC) 10 MG tablet Take 10 mg by mouth daily       denosumab (PROLIA) 60 MG/ML SOSY injection Inject 1 mL (60 mg) Subcutaneous every 6 months (Patient not taking: Reported on 3/26/2021) 1 mL 1     diazepam (VALIUM) 5 MG tablet One tablet nightly as needed (Patient not taking: Reported on 3/26/2021) 15 tablet 0     FLONASE 50 MCG/ACT nasal spray Spray 1 spray into both nostrils daily (Patient not taking: Reported on 3/26/2021) 16 g 5     ibuprofen (ADVIL/MOTRIN) 200 MG tablet Take 400 mg by mouth every 8 hours as needed for mild pain       LIPITOR 20 MG tablet TAKE 1 TABLET (20 MG) BY MOUTH DAILY *[employer] BRAND* (Patient not taking: Reported on 3/26/2021) 85 tablet 0     order for DME Equipment being ordered: Wheelchair    Power wheelchair with tilt and recline function (Patient not taking: Reported on 3/26/2021) 1 Units 0     ORDER FOR DME Equipment being ordered: Hospital Bed (Patient not taking: Reported on 3/26/2021) 1 Units 0     ORDER FOR DME Equipment being ordered: Wheelchair (Patient not taking: Reported on 3/26/2021) 1 Units 0     potassium chloride ER (KLOR-CON) 8 MEQ CR tablet TAKE 2 TABLETS (16 MEQ) BY MOUTH DAILY *PERRIGO BRAND* (Patient not taking: Reported on 3/26/2021) 180 tablet 0     sertraline (ZOLOFT) 100 MG tablet TAKE 2 TABLETS BY MOUTH DAILY *[employer] BRAND* (Patient not taking: Reported on 3/26/2021) 180 tablet 0     triamterene-HCTZ (DYAZIDE) 37.5-25 MG capsule Take 1 capsule by mouth every morning (Patient not taking: Reported on 3/26/2021) 90 capsule 3     Allergies   Allergen Reactions     Penicillins Hives     Atorvastatin      Muscle aches to generic     Simvastatin      Muscle aches     FAMILY HISTORY NOTED AND REVIEWED    REVIEW OF SYSTEMS: above    PHYSICAL EXAM    There were no vitals taken for this visit.    Patient appears non toxic          Vitals:  No vitals were obtained today due to virtual visit.    Physical Exam   GENERAL: Healthy, alert and no distress  EYES: Eyes  grossly normal to inspection.  No discharge or erythema, or obvious scleral/conjunctival abnormalities.  RESP: No audible wheeze, cough, or visible cyanosis.  No visible retractions or increased work of breathing.    SKIN: Visible skin clear. No significant rash, abnormal pigmentation or lesions.  NEURO: Cranial nerves grossly intact.  Mentation and speech appropriate for age.  PSYCH: Mentation appears normal, affect normal/bright, judgement and insight intact, normal speech and appearance well-groomed.    ASSESSMENT:  Acute on chronic issues but now having minimal oral intake and doing poorly.  He needs eval today and can not wait until Monday.  I asked patient to go to emergency room now, he will call 911.    Ezekiel Quevedo M.D.      Video-Visit Details    Type of service:  Video Visit    Video End Time:2:21    Originating Location (pt. Location): Home    Distant Location (provider location):  [facility]     Platform used for Video Visit: Phuong

## 2021-04-30 ENCOUNTER — IMMUNIZATION (OUTPATIENT)
Dept: NURSING | Facility: CLINIC | Age: 61
End: 2021-04-30
Payer: MEDICARE

## 2021-04-30 PROCEDURE — 91300 PR COVID VAC PFIZER DIL RECON 30 MCG/0.3 ML IM: CPT

## 2021-04-30 PROCEDURE — 0001A PR COVID VAC PFIZER DIL RECON 30 MCG/0.3 ML IM: CPT

## 2021-05-21 ENCOUNTER — IMMUNIZATION (OUTPATIENT)
Dept: NURSING | Facility: CLINIC | Age: 61
End: 2021-05-21
Attending: INTERNAL MEDICINE
Payer: MEDICARE

## 2021-05-21 PROCEDURE — 0002A PR COVID VAC PFIZER DIL RECON 30 MCG/0.3 ML IM: CPT

## 2021-05-21 PROCEDURE — 91300 PR COVID VAC PFIZER DIL RECON 30 MCG/0.3 ML IM: CPT

## 2021-07-18 ENCOUNTER — TELEPHONE (OUTPATIENT)
Dept: FAMILY MEDICINE | Facility: CLINIC | Age: 61
End: 2021-07-18

## 2021-07-18 NOTE — TELEPHONE ENCOUNTER
Reason for call:  Other   Patient called regarding (reason for call): call back  Additional comments: Patients wife if calling to see if patient can also get his prolia shot done at his appointment on 7/19 at 2:30. Please call patient to inform them as soon as possible as they take public transport and need to have ride scheduled.     Phone number to reach patient:  Home number on file [phone] (home)    Best Time:  Any time    Can we leave a detailed message on this number?  YES    Travel screening: Not Applicable

## 2021-07-19 ENCOUNTER — TELEPHONE (OUTPATIENT)
Dept: FAMILY MEDICINE | Facility: CLINIC | Age: 61
End: 2021-07-19

## 2021-07-19 ENCOUNTER — OFFICE VISIT (OUTPATIENT)
Dept: FAMILY MEDICINE | Facility: CLINIC | Age: 61
End: 2021-07-19
Payer: MEDICARE

## 2021-07-19 VITALS
BODY MASS INDEX: 31.28 KG/M2 | HEART RATE: 96 BPM | HEIGHT: 62 IN | DIASTOLIC BLOOD PRESSURE: 100 MMHG | OXYGEN SATURATION: 98 % | SYSTOLIC BLOOD PRESSURE: 169 MMHG | WEIGHT: 170 LBS

## 2021-07-19 DIAGNOSIS — T50.905A UNSPECIFIED ADVERSE EFFECT OF DRUG OR MEDICAMENT, INITIAL ENCOUNTER: ICD-10-CM

## 2021-07-19 DIAGNOSIS — E55.9 VITAMIN D DEFICIENCY: ICD-10-CM

## 2021-07-19 DIAGNOSIS — G80.1 SPASTIC DIPLEGIC CEREBRAL PALSY (H): Primary | ICD-10-CM

## 2021-07-19 DIAGNOSIS — R61 EXCESSIVE SWEATING: ICD-10-CM

## 2021-07-19 DIAGNOSIS — M81.0 OSTEOPOROSIS, UNSPECIFIED OSTEOPOROSIS TYPE, UNSPECIFIED PATHOLOGICAL FRACTURE PRESENCE: Primary | ICD-10-CM

## 2021-07-19 DIAGNOSIS — M81.0 OSTEOPOROSIS, UNSPECIFIED OSTEOPOROSIS TYPE, UNSPECIFIED PATHOLOGICAL FRACTURE PRESENCE: ICD-10-CM

## 2021-07-19 DIAGNOSIS — R06.83 SNORING: ICD-10-CM

## 2021-07-19 DIAGNOSIS — Z92.29 HISTORY OF BISPHOSPHONATE THERAPY: ICD-10-CM

## 2021-07-19 DIAGNOSIS — R61 NIGHT SWEATS: ICD-10-CM

## 2021-07-19 DIAGNOSIS — M62.838 MUSCLE SPASM: ICD-10-CM

## 2021-07-19 DIAGNOSIS — E78.5 HYPERLIPIDEMIA LDL GOAL <130: ICD-10-CM

## 2021-07-19 DIAGNOSIS — R05.9 COUGH: ICD-10-CM

## 2021-07-19 DIAGNOSIS — F33.41 RECURRENT MAJOR DEPRESSIVE DISORDER, IN PARTIAL REMISSION (H): ICD-10-CM

## 2021-07-19 DIAGNOSIS — I10 BENIGN ESSENTIAL HYPERTENSION: ICD-10-CM

## 2021-07-19 LAB
ERYTHROCYTE [DISTWIDTH] IN BLOOD BY AUTOMATED COUNT: 14.8 % (ref 10–15)
HCT VFR BLD AUTO: 55.3 % (ref 40–53)
HGB BLD-MCNC: 18.1 G/DL (ref 13.3–17.7)
MCH RBC QN AUTO: 30 PG (ref 26.5–33)
MCHC RBC AUTO-ENTMCNC: 32.7 G/DL (ref 31.5–36.5)
MCV RBC AUTO: 92 FL (ref 78–100)
PLATELET # BLD AUTO: 386 10E3/UL (ref 150–450)
RBC # BLD AUTO: 6.03 10E6/UL (ref 4.4–5.9)
WBC # BLD AUTO: 11.5 10E3/UL (ref 4–11)

## 2021-07-19 PROCEDURE — 99214 OFFICE O/P EST MOD 30 MIN: CPT | Performed by: INTERNAL MEDICINE

## 2021-07-19 PROCEDURE — 80048 BASIC METABOLIC PNL TOTAL CA: CPT | Performed by: INTERNAL MEDICINE

## 2021-07-19 PROCEDURE — 36415 COLL VENOUS BLD VENIPUNCTURE: CPT | Performed by: INTERNAL MEDICINE

## 2021-07-19 PROCEDURE — 80061 LIPID PANEL: CPT | Performed by: INTERNAL MEDICINE

## 2021-07-19 PROCEDURE — 82306 VITAMIN D 25 HYDROXY: CPT | Performed by: INTERNAL MEDICINE

## 2021-07-19 PROCEDURE — 85027 COMPLETE CBC AUTOMATED: CPT | Performed by: INTERNAL MEDICINE

## 2021-07-19 PROCEDURE — 84443 ASSAY THYROID STIM HORMONE: CPT | Performed by: INTERNAL MEDICINE

## 2021-07-19 RX ORDER — VALSARTAN 80 MG/1
80 TABLET ORAL DAILY
Qty: 90 TABLET | Refills: 3 | Status: SHIPPED | OUTPATIENT
Start: 2021-07-19 | End: 2021-10-25

## 2021-07-19 RX ORDER — TRIAMTERENE AND HYDROCHLOROTHIAZIDE 37.5; 25 MG/1; MG/1
1 CAPSULE ORAL EVERY MORNING
Qty: 90 CAPSULE | Refills: 3 | Status: SHIPPED | OUTPATIENT
Start: 2021-07-19 | End: 2022-11-04

## 2021-07-19 RX ORDER — SERTRALINE HYDROCHLORIDE 100 MG/1
TABLET, FILM COATED ORAL
Qty: 180 TABLET | Refills: 3 | Status: SHIPPED | OUTPATIENT
Start: 2021-07-19 | End: 2022-12-28

## 2021-07-19 RX ORDER — ATORVASTATIN CALCIUM 20 MG
TABLET ORAL
Qty: 85 TABLET | Refills: 3 | Status: SHIPPED | OUTPATIENT
Start: 2021-07-19 | End: 2021-07-23

## 2021-07-19 RX ORDER — POTASSIUM CHLORIDE 600 MG/1
TABLET, FILM COATED, EXTENDED RELEASE ORAL
Qty: 180 TABLET | Refills: 3 | Status: SHIPPED | OUTPATIENT
Start: 2021-07-19 | End: 2022-12-28

## 2021-07-19 ASSESSMENT — MIFFLIN-ST. JEOR: SCORE: 1455.36

## 2021-07-19 NOTE — PROGRESS NOTES
This is a patient with cerebral palsy who presents in a wheelchair with his mother for follow-up to multiple issues.    The patient's blood pressure is not well controlled.  He does not check it at home but does take his medication.    He does have depression which is under fair control.  He thinks overall he is doing okay but they have not gotten out much due to Covid.    The patient has high cholesterol for which she takes Lipitor and I will get blood test today.  He has chronic muscle spasms for which he uses baclofen.  He has had night sweats off and on for quite some time.  He has a history of osteoporosis and is overdue for Prolia and also needs vitamin D level checked.  He has had excessive sweating for quite some time.  He does snore and has not been able to do the sleep evaluation.    He has had a cough for a long time.  Is productive of clear phlegm.  He is not having chest pain or breathing trouble.  He ascribes this to black mold in his house.  He is working with his insurance company on that.  He has seen ENT in the past as well and uses sinus rinses for this.    Past Medical History:   Diagnosis Date     Cerebral palsy (H)      Depression, major, in partial remission (H)      High cholesterol      Hypertension      Low HDL (under 40)      Lumbar scoliosis      Lung nodule 2002    fu benign     Microhematuria 2007    ct neg cysto inflammation     Nasal congestion      Nephrolithiasis 1986    urol eval Dr. Beverly     Osteoporosis 2006, fu 2010    dexa 11/10 -1.5 fem neck and -2.9 total hip; fu 2016 and worse, -4.0 hip; seen by Dr. Gale 10/19 and started prolia     Pharyngitis 2002    hosp fsd     Pulmonary HTN (H) 2008    seen on echo, fu 12/16 not seen, nl ef, grade 1 dd     Reflex sympathetic dystrophy of lower extremity      Right ventricular hypertrophy 2008 on echo    ?due to sandie, had fu with Dr. Gaitan, never did sleep study, fu echo 12/16 no rvh seen     Sweats, sweating, excessive 2008    echo  with rvh and pulm htn, ct chest, abd and pelvis with liver cysts and old lung nodule, cosyntropin stim test neg     Urethral stricture 2007    had cysto and ct done for hematuria as well     Vitamin D deficiency      Past Surgical History:   Procedure Laterality Date     GENITOURINARY SURGERY      lithortipsy,  ureteral widened     ORTHOPEDIC SURGERY      left knee- abductors lengthed heel cords lengthened, tendons trransferred , patella lowered,     Social History     Socioeconomic History     Marital status: Single     Spouse name: Not on file     Number of children: o     Years of education: Not on file     Highest education level: Not on file   Occupational History     Employer: DISABLED   Tobacco Use     Smoking status: Never Smoker     Smokeless tobacco: Never Used   Substance and Sexual Activity     Alcohol use: No     Alcohol/week: 0.0 standard drinks     Drug use: No     Sexual activity: Not Currently   Other Topics Concern     Not on file   Social History Narrative     Not on file     Social Determinants of Health     Financial Resource Strain: Low Risk      Difficulty of Paying Living Expenses: Not hard at all   Food Insecurity: No Food Insecurity     Worried About Running Out of Food in the Last Year: Never true     Ran Out of Food in the Last Year: Never true   Transportation Needs: No Transportation Needs     Lack of Transportation (Medical): No     Lack of Transportation (Non-Medical): No   Physical Activity:      Days of Exercise per Week:      Minutes of Exercise per Session:    Stress: No Stress Concern Present     Feeling of Stress : Not at all   Social Connections: Socially Isolated     Frequency of Communication with Friends and Family: Never     Frequency of Social Gatherings with Friends and Family: More than three times a week     Attends Christian Services: Never     Active Member of Clubs or Organizations: No     Attends Club or Organization Meetings: Never     Marital Status: Never   "  Intimate Partner Violence:      Fear of Current or Ex-Partner:      Emotionally Abused:      Physically Abused:      Sexually Abused:      Current Outpatient Medications   Medication Sig Dispense Refill     baclofen (LIORESAL) 20 MG tablet Take 1 tablet (20 mg) by mouth 5 times daily 450 tablet 3     cetirizine (ZYRTEC) 10 MG tablet Take 10 mg by mouth daily       denosumab (PROLIA) 60 MG/ML SOSY injection Inject 1 mL (60 mg) Subcutaneous every 6 months 1 mL 1     diazepam (VALIUM) 5 MG tablet TAKE 1 TABLET BY MOUTH NIGHTLY AS NEEDED 30 tablet 0     FLONASE 50 MCG/ACT nasal spray Spray 1 spray into both nostrils daily 16 g 5     ibuprofen (ADVIL/MOTRIN) 200 MG tablet Take 400 mg by mouth every 8 hours as needed for mild pain       LIPITOR 20 MG tablet TAKE 1 TABLET (20 MG) BY MOUTH DAILY *[employer] BRAND* 85 tablet 3     order for DME Equipment being ordered: Wheelchair    Power wheelchair with tilt and recline function 1 Units 0     ORDER FOR DME Equipment being ordered: Hospital Bed 1 Units 0     ORDER FOR DME Equipment being ordered: Wheelchair 1 Units 0     potassium chloride ER (KLOR-CON) 8 MEQ CR tablet TAKE 2 TABLETS (16 MEQ) BY MOUTH DAILY *PERRIGO BRAND* 180 tablet 3     sertraline (ZOLOFT) 100 MG tablet TAKE 2 TABLETS BY MOUTH DAILY *NORTHSTAR BRAND* 180 tablet 3     triamterene-HCTZ (DYAZIDE) 37.5-25 MG capsule Take 1 capsule by mouth every morning 90 capsule 3     valsartan (DIOVAN) 80 MG tablet Take 1 tablet (80 mg) by mouth daily 90 tablet 3     Allergies   Allergen Reactions     Penicillins Hives     Atorvastatin      Muscle aches to generic     Simvastatin      Muscle aches     FAMILY HISTORY NOTED AND REVIEWED    REVIEW OF SYSTEMS: above    PHYSICAL EXAM    BP (!) 169/100 (BP Location: Left arm, Patient Position: Chair, Cuff Size: Adult Large)   Pulse 96   Ht 1.575 m (5' 2\")   Wt 77.1 kg (170 lb)   SpO2 98%   BMI 31.09 kg/m      Patient appears non toxic  Lungs clear  cv reglar rate and rhythm, " no jvp or edema    Labs sent    ASSESSMENT:  1. Cough, chronic, to follow up with pulm  2. Hypertension, control not adequate, to add diovan  3. Elevated cholesterol on statin  4. Depression, control fair, for now monitor it and call if worsens  5. Muscle spasms/c.p  6. Night sweats, doubt pathologic, follow up labs  7., snoring, suspect sandie, to sleep clinic  8. Osteopor, needs prolia    PLAN:  Add diovan  To sleep clinic  Follow up 3 months  Labs now  Call if changes  Follow up with pulmonary eval    Ezekiel Quevedo M.D.

## 2021-07-19 NOTE — TELEPHONE ENCOUNTER
[facility] called regarding Lipitor RX refill.  Requesting a 90 day refill versus the 85 ordered.  Gave verbal ok for refill of 90.  Historically shows ordered as 90 tabs.    Stacy Posadas RN

## 2021-07-19 NOTE — TELEPHONE ENCOUNTER
F/u with patient after Prolia insurance authorized---to schedule injection with CYRIL GAMBLE, RN      July 19, 2021  5:14 PM

## 2021-07-19 NOTE — TELEPHONE ENCOUNTER
Message left for patient that he will need to schedule follow up with Dr. Gale as it has been almost 2 years since he has been seen for follow up. [facility] left on voicemail for patient to call and schedule follow up.    Cas Du, SUNG on 7/19/2021 at 9:57 AM

## 2021-07-19 NOTE — PATIENT INSTRUCTIONS
1. Start the new blood pressure medicine called valsartan once daily    2. See the lung doctor    3. Follow up with me in 3 months    4. We will arrange the prolia shot    Ezekiel Quevedo M.D.

## 2021-07-19 NOTE — TELEPHONE ENCOUNTER
Message relayed to wife, has been unable to schedule witih Dr. Gale, will discuss this with provider at his appt.

## 2021-07-20 LAB
ANION GAP SERPL CALCULATED.3IONS-SCNC: 4 MMOL/L (ref 3–14)
BUN SERPL-MCNC: 15 MG/DL (ref 7–30)
CALCIUM SERPL-MCNC: 10.2 MG/DL (ref 8.5–10.1)
CHLORIDE BLD-SCNC: 96 MMOL/L (ref 94–109)
CHOLEST SERPL-MCNC: 206 MG/DL
CO2 SERPL-SCNC: 33 MMOL/L (ref 20–32)
CREAT SERPL-MCNC: 0.9 MG/DL (ref 0.66–1.25)
DEPRECATED CALCIDIOL+CALCIFEROL SERPL-MC: 56 UG/L (ref 20–75)
FASTING STATUS PATIENT QL REPORTED: YES
GFR SERPL CREATININE-BSD FRML MDRD: >90 ML/MIN/1.73M2
GLUCOSE BLD-MCNC: 93 MG/DL (ref 70–99)
HDLC SERPL-MCNC: 39 MG/DL
LDLC SERPL CALC-MCNC: 127 MG/DL
NONHDLC SERPL-MCNC: 167 MG/DL
POTASSIUM BLD-SCNC: 4.4 MMOL/L (ref 3.4–5.3)
SODIUM SERPL-SCNC: 133 MMOL/L (ref 133–144)
TRIGL SERPL-MCNC: 200 MG/DL
TSH SERPL DL<=0.005 MIU/L-ACNC: 2 MU/L (ref 0.4–4)

## 2021-07-20 NOTE — RESULT ENCOUNTER NOTE
It was a please seeing you.  You should be able to view your labs.    The most part your tests look very good.  Your thyroid and vitamin D levels are normal.  Your cholesterol is just a bit higher but not a problem.    For some reason your CBC your blood count is a bit off this time.  I suspect that this is simply a lab variation as the hemoglobin has jumped a fair amount.  I can repeat this in 3 months when you come back.  However, if you are able to have your blood drawn a bit sooner than that that would be great.  You do not have to fast for this and you do not have to see me but we would want to schedule that ahead of time.  Please let me know if we can arrange that.    The slightly elevated calcium is not significant either.    The most part your tests look very good.  Your thyroid and vitamin D levels are normal.  Your cholesterol is just a bit higher but not a problem.    For some reason your CBC your blood count is a bit off this time.  I suspect that this is simply a lab variation as the hemoglobin has jumped a fair amount.  I can repeat this in 3 months when you come back.  However, if you are able to have your blood drawn a bit sooner than that that would be great.  You do not have to fast for this and you do not have to see me but we would want to schedule that ahead of time.  Please let me know if we can arrange that.    The slightly elevated calcium is not significant either.    Ezekiel Quevedo M.D.

## 2021-07-21 NOTE — TELEPHONE ENCOUNTER
Karlie Orellana, Ezekiel Winters MD; Alicia Hollis, ORALIA; Carmen Bahena, RN  Hello       This patient is scheduled for prolia.  With their insurance coverage, it follows Medicare's NGS policy.  Currently we have the DX M81.0 but this request will need an additional diagnosis to support why this patient is not appropriate for other therapies.  Below is a list of secondary diagnosis that would follow Medicare's policy.     Z92.29 - Personal history of other drug therapy       T50.995S - Adverse effect of other drugs, medicaments and biological substances, sequela   T88.7XXS - Unspecified adverse effect of drug or medicament, sequela   Z88.8 - Allergy status to other drugs, medicaments and biological substances status     N18.30 Chronic kidney disease, stage 3 unspecified   N18.31 Chronic kidney disease, stage 3a   N18.32 Chronic kidney disease, stage 3b   N18.4 Chronic kidney disease, stage 4 (severe)   N18.5 Chronic kidney disease, stage 5     Would an additional diagnosis code apply to this patient? If so, can you please have this added to the CAM order as the secondary diagnosis?  Please let me know when this is completed and I can proceed with verifying patient's benefit for this medication.     Thank you,     Karlie Orellana

## 2021-07-21 NOTE — TELEPHONE ENCOUNTER
Per chart review pt took fosamax and alendronate in past     Signed with additional dx of history of bisphosphonate therapy     Soha OBRIEN RN

## 2021-07-21 NOTE — TELEPHONE ENCOUNTER
Ezekiel Quevedo MD Nolan, Sharon, RN 5 hours ago (12:05 PM)   PG  I put this in yesterday     Ezekiel Quevedo M.D.    Message text

## 2021-07-23 ENCOUNTER — TELEPHONE (OUTPATIENT)
Dept: FAMILY MEDICINE | Facility: CLINIC | Age: 61
End: 2021-07-23

## 2021-07-23 DIAGNOSIS — E78.5 HYPERLIPIDEMIA LDL GOAL <130: ICD-10-CM

## 2021-07-23 RX ORDER — ATORVASTATIN CALCIUM 20 MG
TABLET ORAL
Qty: 90 TABLET | Refills: 3 | Status: SHIPPED | OUTPATIENT
Start: 2021-07-23 | End: 2022-12-28

## 2021-07-23 NOTE — TELEPHONE ENCOUNTER
PCP: need alternative to Lipitor 20mg    [facility] called and said insurance will not cover the Lipitor 20mg.  Can they prescribe the generic and if so send new script with out the PALMER.  Or is their another replacement for this.    [phone] - [facility].    Stacy Posadas RN

## 2021-07-28 NOTE — TELEPHONE ENCOUNTER
Called patient     Pt gave verbal ok to speak with Ivette rock     Last prolia was 12/2019     Scheduled marissa OBRIEN RN

## 2021-08-04 ENCOUNTER — LAB (OUTPATIENT)
Dept: LAB | Facility: CLINIC | Age: 61
End: 2021-08-04
Payer: MEDICARE

## 2021-08-04 ENCOUNTER — ALLIED HEALTH/NURSE VISIT (OUTPATIENT)
Dept: NURSING | Facility: CLINIC | Age: 61
End: 2021-08-04
Payer: MEDICARE

## 2021-08-04 DIAGNOSIS — M81.0 OSTEOPOROSIS: Primary | ICD-10-CM

## 2021-08-04 DIAGNOSIS — E83.52 HIGH CALCIUM LEVELS: ICD-10-CM

## 2021-08-04 DIAGNOSIS — R79.89 ABNORMAL CBC: ICD-10-CM

## 2021-08-04 LAB
BASOPHILS # BLD AUTO: 0.1 10E3/UL (ref 0–0.2)
BASOPHILS NFR BLD AUTO: 1 %
CA-I BLD-MCNC: 4.8 MG/DL (ref 4.4–5.2)
EOSINOPHIL # BLD AUTO: 0.1 10E3/UL (ref 0–0.7)
EOSINOPHIL NFR BLD AUTO: 1 %
ERYTHROCYTE [DISTWIDTH] IN BLOOD BY AUTOMATED COUNT: 14.3 % (ref 10–15)
HCT VFR BLD AUTO: 48 % (ref 40–53)
HGB BLD-MCNC: 16.1 G/DL (ref 13.3–17.7)
LYMPHOCYTES # BLD AUTO: 1.4 10E3/UL (ref 0.8–5.3)
LYMPHOCYTES NFR BLD AUTO: 15 %
MCH RBC QN AUTO: 30.7 PG (ref 26.5–33)
MCHC RBC AUTO-ENTMCNC: 33.5 G/DL (ref 31.5–36.5)
MCV RBC AUTO: 92 FL (ref 78–100)
MONOCYTES # BLD AUTO: 0.8 10E3/UL (ref 0–1.3)
MONOCYTES NFR BLD AUTO: 8 %
NEUTROPHILS # BLD AUTO: 7 10E3/UL (ref 1.6–8.3)
NEUTROPHILS NFR BLD AUTO: 75 %
PLATELET # BLD AUTO: 362 10E3/UL (ref 150–450)
RBC # BLD AUTO: 5.24 10E6/UL (ref 4.4–5.9)
WBC # BLD AUTO: 9.3 10E3/UL (ref 4–11)

## 2021-08-04 PROCEDURE — 82330 ASSAY OF CALCIUM: CPT

## 2021-08-04 PROCEDURE — 99207 PR NO CHARGE NURSE ONLY: CPT

## 2021-08-04 PROCEDURE — 85025 COMPLETE CBC W/AUTO DIFF WBC: CPT

## 2021-08-04 PROCEDURE — 96372 THER/PROPH/DIAG INJ SC/IM: CPT | Performed by: INTERNAL MEDICINE

## 2021-08-04 PROCEDURE — 36415 COLL VENOUS BLD VENIPUNCTURE: CPT

## 2021-08-04 NOTE — PROGRESS NOTES
Clinic Administered Medication Documentation    Administrations This Visit     denosumab (PROLIA) injection 60 mg     Admin Date  08/04/2021 Action  Given Dose  60 mg Route  Subcutaneous Site  Right Arm Administered By  Cyndie Westbrook RN    Ordering Provider: Ezekiel Quevedo MD    Patient Supplied?: No                  Prolia Documentation    Prior to injection, verified patient identity using patient's name and date of birth. Medication was administered. Please see MAR and medication order for additional information. Patient instructed to remain in clinic for 15 minutes.    Indication: Prolia  (denosumab) is a prescription medicine used to treat osteoporosis in patients who:     Are at high risk for fracture, meaning patients who have had a fracture related to osteoporosis, or who have multiple risk factors for fracture.    Cannot use another osteoporosis medicine or other osteoporosis medicines did not work well.    The timeline for early/late injections would be 4 weeks early and any time after the 6 month debora. If a patient receives their injection late, then the subsequent injection would be 6 months from the date that they actually received the injection.    When was the last injection?  10/2019  Was the last injection at least 6 months ago? Yes  Has the prior authorization been completed?  Yes  Is there an active order (within the past 365 days) in the chart?  Yes  Patient denied having dental work involving the bone in the past 6 months?  Yes  Patient denies a plan to dental work involving the bone in the next 6 months? Yes    The following steps were completed to comply with the REMS program for Prolia:    Reviewed information in the Medication Guide and Patient Counseling Chart, including the serious risks of Prolia  and the symptoms of each risk.    Advised patient to seek prompt medical attention if they have signs or symptoms of any of the serious risks.    Provided each patient a copy of the  Medication Guide and Patient Brochure.      Was entire vial of medication used? Yes  Vial/Syringe: Syringe  Expiration Date:  10/23  Was the medication not being billed by clinic? No

## 2021-09-10 DIAGNOSIS — R05.9 COUGH: Primary | ICD-10-CM

## 2021-09-13 NOTE — PROGRESS NOTES
Patient is scheduled for a PFT new appointment with Dr. Galloway on 12/27/2021. Referring diagnosis is cough. Placed orders for PFT and chest x-ray. Sent [email or web address] message to patient with details to schedule chest x-ray.     Tayler Man RN

## 2021-09-17 DIAGNOSIS — R25.2 SPASM: ICD-10-CM

## 2021-09-20 RX ORDER — DIAZEPAM 5 MG
TABLET ORAL
Qty: 30 TABLET | Refills: 0 | Status: SHIPPED | OUTPATIENT
Start: 2021-09-20 | End: 2021-11-22

## 2021-09-20 NOTE — TELEPHONE ENCOUNTER
Routing refill request to provider for review/approval because:  Has an appt scheduled out on 10/25  Blanquita Hercules RN

## 2021-10-06 ENCOUNTER — TELEPHONE (OUTPATIENT)
Dept: PULMONOLOGY | Facility: CLINIC | Age: 61
End: 2021-10-06

## 2021-10-07 ENCOUNTER — TELEPHONE (OUTPATIENT)
Dept: PULMONOLOGY | Facility: CLINIC | Age: 61
End: 2021-10-07

## 2021-10-08 ENCOUNTER — TELEPHONE (OUTPATIENT)
Dept: PULMONOLOGY | Facility: CLINIC | Age: 61
End: 2021-10-08

## 2021-10-08 NOTE — TELEPHONE ENCOUNTER
Franca Albrecht,   I need to reschedule your Pulmonary Function Test you have scheduled on Monday 12/27/21 at 2 PM they are no longer having these tests on Mondays. I have a Tuesday 12/21 available either morning or the very latest at 2 PM. Please contact the Pulmonary Function Testing Clinic at [phone] This test must be done before seeing Dr. Galloway on 12/27/21 And these appointment times will not be available much longer.  Thank you

## 2021-10-19 NOTE — PROGRESS NOTES
No ans cell. I left message with Dr. Asif comment to schedule nurse only pascale Quevedo, MD Maddy Vargas Katherine, CMA             Please repeat blood pressure in next month or so.     Ezekiel Quevedo M.D.        
Since in getting Flu shot thought they would have their BP's check.  They said Dr. Valenzuela likes to have them check periodically.  DID NOT take BP meds this morning.     Ambrocio Delvalle is a 59 year old patient who comes in today for a Blood Pressure check.  Initial BP:  BP (!) 146/90 (BP Location: Right arm, Patient Position: Chair, Cuff Size: Adult Large)   Pulse 81   SpO2 94%      81  Disposition: results routed to provider    
anxiety

## 2021-10-25 ENCOUNTER — OFFICE VISIT (OUTPATIENT)
Dept: FAMILY MEDICINE | Facility: CLINIC | Age: 61
End: 2021-10-25
Payer: MEDICARE

## 2021-10-25 VITALS
OXYGEN SATURATION: 96 % | RESPIRATION RATE: 16 BRPM | DIASTOLIC BLOOD PRESSURE: 84 MMHG | HEART RATE: 98 BPM | SYSTOLIC BLOOD PRESSURE: 152 MMHG

## 2021-10-25 DIAGNOSIS — Z23 NEED FOR PROPHYLACTIC VACCINATION AND INOCULATION AGAINST INFLUENZA: ICD-10-CM

## 2021-10-25 DIAGNOSIS — I10 BENIGN ESSENTIAL HYPERTENSION: Primary | ICD-10-CM

## 2021-10-25 DIAGNOSIS — R05.9 COUGH: ICD-10-CM

## 2021-10-25 PROCEDURE — G0008 ADMIN INFLUENZA VIRUS VAC: HCPCS | Performed by: INTERNAL MEDICINE

## 2021-10-25 PROCEDURE — 90682 RIV4 VACC RECOMBINANT DNA IM: CPT | Performed by: INTERNAL MEDICINE

## 2021-10-25 PROCEDURE — 99214 OFFICE O/P EST MOD 30 MIN: CPT | Mod: 25 | Performed by: INTERNAL MEDICINE

## 2021-10-25 RX ORDER — AMLODIPINE BESYLATE 2.5 MG/1
2.5 TABLET ORAL DAILY
Qty: 90 TABLET | Refills: 3 | Status: SHIPPED | OUTPATIENT
Start: 2021-10-25 | End: 2022-12-28

## 2021-10-25 NOTE — PATIENT INSTRUCTIONS
Stop the valsartan     Start the amlodipine and take 2.5mg once daily.    Continue the other medications    Radiology should call you within 3 days to schedule the video swallow.  If they do not let me know.    Ezekiel Quevedo M.D.

## 2021-10-25 NOTE — PROGRESS NOTES
The patient is here for follow-up to his hypertension.  As noted and reviewed when I saw him last time I added Diovan.  However, each time he took it he developed diarrhea.  He would stop it and the diarrhea would go away but if he took it again he came back.  Is taking his other medication.    His biggest issue is been a chronic cough for over a year.  He has seen pulmonary but not till the end of December.  He is not short of breath or having chest pain or fevers.  The cough can be productive.  It seems to be worse when he is lying down.  No history of lung disease and has never been a smoker.  He says that with the cough he can cough up sometimes food.  He does not notice that he is choking when he eats or when he swallows however.    Past Medical History:   Diagnosis Date     Cerebral palsy (H)      Depression, major, in partial remission (H)      High cholesterol      Hypertension      Low HDL (under 40)      Lumbar scoliosis      Lung nodule 2002    fu benign     Microhematuria 2007    ct neg cysto inflammation     Nasal congestion      Nephrolithiasis 1986    urol eval Dr. Beverly     Osteoporosis 2006, fu 2010    dexa 11/10 -1.5 fem neck and -2.9 total hip; fu 2016 and worse, -4.0 hip; seen by Dr. Gale 10/19 and started prolia     Pharyngitis 2002    hosp fsd     Pulmonary HTN (H) 2008    seen on echo, fu 12/16 not seen, nl ef, grade 1 dd     Reflex sympathetic dystrophy of lower extremity      Right ventricular hypertrophy 2008 on echo    ?due to sandie, had fu with Dr. Gaitan, never did sleep study, fu echo 12/16 no rvh seen     Sweats, sweating, excessive 2008    echo with rvh and pulm htn, ct chest, abd and pelvis with liver cysts and old lung nodule, cosyntropin stim test neg     Urethral stricture 2007    had cysto and ct done for hematuria as well     Vitamin D deficiency      Past Surgical History:   Procedure Laterality Date     GENITOURINARY SURGERY      lithortipsy,  ureteral widened     ORTHOPEDIC  SURGERY      left knee- abductors lengthed heel cords lengthened, tendons trransferred , patella lowered,     Social History     Socioeconomic History     Marital status: Single     Spouse name: Not on file     Number of children: o     Years of education: Not on file     Highest education level: Not on file   Occupational History     Employer: DISABLED   Tobacco Use     Smoking status: Never Smoker     Smokeless tobacco: Never Used   Substance and Sexual Activity     Alcohol use: No     Alcohol/week: 0.0 standard drinks     Drug use: No     Sexual activity: Not Currently   Other Topics Concern     Not on file   Social History Narrative     Not on file     Social Determinants of Health     Financial Resource Strain: Low Risk      Difficulty of Paying Living Expenses: Not hard at all   Food Insecurity: No Food Insecurity     Worried About Running Out of Food in the Last Year: Never true     Ran Out of Food in the Last Year: Never true   Transportation Needs: No Transportation Needs     Lack of Transportation (Medical): No     Lack of Transportation (Non-Medical): No   Physical Activity:      Days of Exercise per Week:      Minutes of Exercise per Session:    Stress: No Stress Concern Present     Feeling of Stress : Not at all   Social Connections: Socially Isolated     Frequency of Communication with Friends and Family: Never     Frequency of Social Gatherings with Friends and Family: More than three times a week     Attends [employer] Services: Never     Active Member of Clubs or Organizations: No     Attends Club or Organization Meetings: Never     Marital Status: Never    Intimate Partner Violence:      Fear of Current or Ex-Partner:      Emotionally Abused:      Physically Abused:      Sexually Abused:      Current Outpatient Medications   Medication Sig Dispense Refill     amLODIPine (NORVASC) 2.5 MG tablet Take 1 tablet (2.5 mg) by mouth daily 90 tablet 3     baclofen (LIORESAL) 20 MG tablet Take 1 tablet  (20 mg) by mouth 5 times daily 450 tablet 3     cetirizine (ZYRTEC) 10 MG tablet Take 10 mg by mouth daily       denosumab (PROLIA) 60 MG/ML SOSY injection Inject 1 mL (60 mg) Subcutaneous every 6 months 1 mL 1     diazepam (VALIUM) 5 MG tablet TAKE 1 TABLET BY MOUTH NIGHTLY AS NEEDED 30 tablet 0     FLONASE 50 MCG/ACT nasal spray Spray 1 spray into both nostrils daily 16 g 5     ibuprofen (ADVIL/MOTRIN) 200 MG tablet Take 400 mg by mouth every 8 hours as needed for mild pain       LIPITOR 20 MG tablet TAKE 1 TABLET (20 MG) BY MOUTH DAILY *[employer] BRAND* 90 tablet 3     order for DME Equipment being ordered: Wheelchair    Power wheelchair with tilt and recline function 1 Units 0     ORDER FOR DME Equipment being ordered: Hospital Bed 1 Units 0     ORDER FOR DME Equipment being ordered: Wheelchair 1 Units 0     potassium chloride ER (KLOR-CON) 8 MEQ CR tablet TAKE 2 TABLETS (16 MEQ) BY MOUTH DAILY *PERRIG[employer] BRAND* 180 tablet 3     sertraline (ZOLOFT) 100 MG tablet TAKE 2 TABLETS BY MOUTH DAILY *[employer] BRAND* 180 tablet 3     triamterene-HCTZ (DYAZIDE) 37.5-25 MG capsule Take 1 capsule by mouth every morning 90 capsule 3     Allergies   Allergen Reactions     Penicillins Hives     Atorvastatin      Muscle aches to generic     Simvastatin      Muscle aches     FAMILY HISTORY NOTED AND REVIEWED    REVIEW OF SYSTEMS: above    PHYSICAL EXAM    BP (!) 152/84   Pulse 98   Resp 16   SpO2 96%     Patient appears non toxic  Lungs clear, normal flow  cv reglar rate and rhythm with occasional extra beat, no murmer, rub or gallop, no jvp or edema    ASSESSMENT:  1. Hypertension, control not less adequate, avoiding acei due to cough, diarrhea with diovan  2. Cough, cause not clear, wonder about aspiration    PLAN:  norvasc 2.5mg  cxr  Swallow study  Follow up pulmohit Queveod M.D.

## 2021-11-12 ENCOUNTER — HOSPITAL ENCOUNTER (OUTPATIENT)
Dept: GENERAL RADIOLOGY | Facility: CLINIC | Age: 61
Discharge: HOME | End: 2021-11-12
Attending: INTERNAL MEDICINE | Admitting: INTERNAL MEDICINE
Payer: MEDICARE

## 2021-11-12 PROCEDURE — 71046 X-RAY EXAM CHEST 2 VIEWS: CPT

## 2021-11-13 NOTE — RESULT ENCOUNTER NOTE
José Miguel,    Your chest xray is normal.  The granulomas are just old scars.  Please be sure to follow up with the lung specialist.    Ezekiel Quevedo M.D.

## 2021-11-22 DIAGNOSIS — R25.2 SPASM: ICD-10-CM

## 2021-11-22 RX ORDER — DIAZEPAM 5 MG
TABLET ORAL
Qty: 30 TABLET | Refills: 0 | Status: SHIPPED | OUTPATIENT
Start: 2021-11-22 | End: 2022-02-25

## 2021-11-22 NOTE — TELEPHONE ENCOUNTER
Routing refill request to provider for review/approval because:  Drug not on the [facility] refill protocol   Blanquita Hercules RN

## 2021-12-21 ENCOUNTER — ALLIED HEALTH/NURSE VISIT (OUTPATIENT)
Dept: PULMONOLOGY | Facility: CLINIC | Age: 61
End: 2021-12-21
Payer: MEDICARE

## 2021-12-21 DIAGNOSIS — R05.9 COUGH: Primary | ICD-10-CM

## 2021-12-21 PROCEDURE — 94375 RESPIRATORY FLOW VOLUME LOOP: CPT | Performed by: INTERNAL MEDICINE

## 2021-12-21 PROCEDURE — 94729 DIFFUSING CAPACITY: CPT | Performed by: INTERNAL MEDICINE

## 2021-12-22 LAB
DLCOUNC-%PRED-PRE: 65 %
DLCOUNC-PRE: 13.95 ML/MIN/MMHG
DLCOUNC-PRED: 21.15 ML/MIN/MMHG
ERV-%PRED-PRE: 112 %
ERV-PRE: 0.54 L
ERV-PRED: 0.48 L
EXPTIME-PRE: 1.35 SEC
FEF2575-%PRED-PRE: 113 %
FEF2575-PRE: 2.72 L/SEC
FEF2575-PRED: 2.39 L/SEC
FEFMAX-%PRED-PRE: 41 %
FEFMAX-PRE: 3.07 L/SEC
FEFMAX-PRED: 7.45 L/SEC
FEV1-%PRED-PRE: 87 %
FEV1-PRE: 2.36 L
FEV1FEV6-PRE: 92 %
FEV1FEV6-PRED: 79 %
FEV1FVC-PRE: 94 %
FEV1FVC-PRED: 79 %
FEV1SVC-PRE: 125 %
FEV1SVC-PRED: 76 %
FIFMAX-PRE: 2.13 L/SEC
FVC-%PRED-PRE: 73 %
FVC-PRE: 2.51 L
FVC-PRED: 3.43 L
IC-%PRED-PRE: 43 %
IC-PRE: 1.34 L
IC-PRED: 3.08 L
VA-%PRED-PRE: 67 %
VA-PRE: 3.27 L
VC-%PRED-PRE: 52 %
VC-PRE: 1.89 L
VC-PRED: 3.57 L

## 2021-12-27 ENCOUNTER — OFFICE VISIT (OUTPATIENT)
Dept: PULMONOLOGY | Facility: CLINIC | Age: 61
End: 2021-12-27
Attending: INTERNAL MEDICINE
Payer: MEDICARE

## 2021-12-27 VITALS — DIASTOLIC BLOOD PRESSURE: 85 MMHG | HEART RATE: 89 BPM | OXYGEN SATURATION: 97 % | SYSTOLIC BLOOD PRESSURE: 138 MMHG

## 2021-12-27 DIAGNOSIS — R05.9 COUGH: Primary | ICD-10-CM

## 2021-12-27 DIAGNOSIS — R94.2 ABNORMAL PFT: ICD-10-CM

## 2021-12-27 PROCEDURE — 99204 OFFICE O/P NEW MOD 45 MIN: CPT | Performed by: INTERNAL MEDICINE

## 2021-12-27 ASSESSMENT — PAIN SCALES - GENERAL: PAINLEVEL: MODERATE PAIN (5)

## 2021-12-27 NOTE — LETTER
12/27/2021     RE: Ambrocio Delvalle  [address]  [facility] [address]    Dear Colleague,    Thank you for referring your patient, Ambrocio Delvalle, to the [facility] [place]. Please see a copy of my visit note below.    Pulmonary Clinic New Patient Consult  Reason for Consult: cough  History of Present Illness  Mr. Ambrocio Delvalle is a 61 yom with a past history of Cerebral Palsy who presents to pulmonary clinic today for further evaluation of cough.  He is accompanied to clinic by his mother who helps provide additional history.    Mr. Delvalle has no previously known history of lung disease.  He states that his cough started in the summer 2020.  His mom had a cough that started around the same time.  This was shortly after the neighbors tree root grew into their septic system and caused some kind of backup.  They believe that this resulted in a mold problem in their house.  When the cough started it was initially productive of clear phlegm.  The phlegm is now a thick and stringy pussy substance that is more white-tan in color.  The cough occurs throughout the day and also at night making it difficult to sleep.  It sounds like the cough will sometimes wake him up at night.  He was previously seen by his primary care doctor for this cough and Zyrtec and Flonase were recommended for possible postnasal drip.  He has continued taking these without appreciable improvement in his cough.  At night when he is coughing his mom will sometimes hear a gurgling or wheezing type sensation.  He denies any fevers, hemoptysis, or history of recurrent pneumonias.  He is not using any inhalers or nebulizers.  He denies any history of heartburn or acid reflux.  No history of allergies or hayfever.  He also notes some occasional difficulty swallowing and a sensation like food gets stuck.  They describe this is being related to thick phlegm coming up from the lungs and coating the food that he is  trying to swallow such that it gets stuck and he is unable to swallow it down.    He is a never smoker.  He does not have any significant occupational exposures.  They have no pets at home.  The house does have asbestos siding.  They do not keep birds or chickens but notes that her next-door neighbor has a chicken coop in their yard which is not far from [facility].  He denies any significant exposure to down feathers, hot tub or Jacuzzi he uses on a regular basis, or recent travel outside the area.    Family history is notable for a brother who  of lung cancer (positive tobacco exposure).      Review of Systems:  10 of 14 systems reviewed and are negative unless otherwise stated in HPI.    Past Medical History:   Diagnosis Date     Cerebral palsy (H)      Depression, major, in partial remission (H)      High cholesterol      Hypertension      Low HDL (under 40)      Lumbar scoliosis      Lung nodule 2002    fu benign     Microhematuria 2007    ct neg cysto inflammation     Nasal congestion      Nephrolithiasis     urol eval Dr. Beverly     Osteoporosis , fu     dexa 11/10 -1.5 fem neck and -2.9 total hip; fu  and worse, -4.0 hip; seen by Dr. Gale 10/19 and started prolia     Pharyngitis     hosp fsd     Pulmonary HTN (H) 2008    seen on echo, fu  not seen, nl ef, grade 1 dd     Reflex sympathetic dystrophy of lower extremity      Right ventricular hypertrophy 2008 on echo    ?due to sandie, had fu with Dr. Gaitan, never did sleep study, fu echo  no rvh seen     Sweats, sweating, excessive     echo with rvh and pulm htn, ct chest, abd and pelvis with liver cysts and old lung nodule, cosyntropin stim test neg     Urethral stricture     had cysto and ct done for hematuria as well     Vitamin D deficiency        Allergies   Allergen Reactions     Penicillins Hives     Atorvastatin      Muscle aches to generic     Simvastatin      Muscle aches         Current Outpatient  Medications:      amLODIPine (NORVASC) 2.5 MG tablet, Take 1 tablet (2.5 mg) by mouth daily, Disp: 90 tablet, Rfl: 3     baclofen (LIORESAL) 20 MG tablet, Take 1 tablet (20 mg) by mouth 5 times daily, Disp: 450 tablet, Rfl: 3     cetirizine (ZYRTEC) 10 MG tablet, Take 10 mg by mouth daily, Disp: , Rfl:      denosumab (PROLIA) 60 MG/ML SOSY injection, Inject 1 mL (60 mg) Subcutaneous every 6 months, Disp: 1 mL, Rfl: 1     diazepam (VALIUM) 5 MG tablet, TAKE 1 TABLET BY MOUTH NIGHTLY AS NEEDED, Disp: 30 tablet, Rfl: 0     FLONASE 50 MCG/ACT nasal spray, Spray 1 spray into both nostrils daily, Disp: 16 g, Rfl: 5     LIPITOR 20 MG tablet, TAKE 1 TABLET (20 MG) BY MOUTH DAILY *[employer] BRAND*, Disp: 90 tablet, Rfl: 3     order for DME, Equipment being ordered: Wheelchair  Power wheelchair with tilt and recline function, Disp: 1 Units, Rfl: 0     ORDER FOR DME, Equipment being ordered: Hospital Bed, Disp: 1 Units, Rfl: 0     ORDER FOR DME, Equipment being ordered: Wheelchair, Disp: 1 Units, Rfl: 0     potassium chloride ER (KLOR-CON) 8 MEQ CR tablet, TAKE 2 TABLETS (16 MEQ) BY MOUTH DAILY *[employer] BRAND*, Disp: 180 tablet, Rfl: 3     sertraline (ZOLOFT) 100 MG tablet, TAKE 2 TABLETS BY MOUTH DAILY *[employer] BRAND*, Disp: 180 tablet, Rfl: 3     triamterene-HCTZ (DYAZIDE) 37.5-25 MG capsule, Take 1 capsule by mouth every morning, Disp: 90 capsule, Rfl: 3     ibuprofen (ADVIL/MOTRIN) 200 MG tablet, Take 400 mg by mouth every 8 hours as needed for mild pain (Patient not taking: Reported on 12/27/2021), Disp: , Rfl:     Current Facility-Administered Medications:      denosumab (PROLIA) injection 60 mg, 60 mg, Subcutaneous, Once, Ezekiel Quevedo MD     denosumab (PROLIA) injection 60 mg, 60 mg, Subcutaneous, Q6 Months, Kurtis Gale MD, 60 mg at 12/20/19 1410      Physical Exam:  /85 (BP Location: Right arm, Patient Position: Sitting, Cuff Size: Adult Large)   Pulse 89   SpO2 97%   GENERAL: Well developed,  well nourished, alert, and in no apparent distress.  Seated in wheelchair  HEENT: Normocephalic, atraumatic. PERRL, EOMI.   NECK: supple, no obvious masses.  RESP:  Normal respiratory effort.  CTAB.  No rales, wheezes, rhonchi.  No cyanosis or clubbing.  CV: Normal S1, S2, regular rhythm, normal rate. No murmur.  No LE edema.   ABDOMEN: non-distended.   SKIN: warm and dry. No rash.  NEURO: AAOx3.  Fluent speech.  PSYCH: mentation appears normal.     Results (personally reviewed in clinic today):  PFTs: Extremely limited interpretation due to inability to meet ATS criteria for multiple maneuvers.  He was unable to get in the body box to perform Plath so lung volumes were not obtained.  Ratio 94%, FVC 73%, FEV1 87%, DLCO 65%.  Study possibly suggestive of a restrictive ventilatory defect although lung volumes would be needed for diagnosis.  Possible impairment in gas exchange.  No prior study available for comparison.  Imaging:  Chest x-ray from 11/12: Negative except for evidence of old granulomatous disease.    Assessment and Plan:   Ambrocio Delvalle is a 61 year old male with a history of cerebral palsy and chronic productive cough of approximately 1 years duration.  1. Chronic cough.  CXR is negative except for bilateral calcified granulomas.  PFTs do not really provide additional reliable information due to difficulty with maneuvers and inability to perform pleth.  Acknowledging these limitations, there is possible suggestion of restriction on spirometry.  Based on this, normal CXR and lack of response to treatment for allergic rhinitis/post nasal drip as etiology of cough, I do think it would be reasonable to proceed with HR-CT chest to better evaluate lung parenchyma and to exclude ILD as etiology of cough.  We will proceed with CT chest and be in touch with the patient regarding his results.  Given swallowing difficulties and what sounds like worsening of cough symptoms at night, I would also wonder about  acid reflux/chronic aspiration (which was also considered by his PCP). A swallow study appears to have been ordered but has not been done - I will encourage them to complete this as well.  Asthma (+/- mold sensitization) would also be a consideration.  If nothing obvious on CT chest, I would consider empiric treatment for asthma with ICS to see if this helps.  We may also consider referral to GI for further evaluation of globus sensation and dysphagia.  He should continue Flonase and Zyrtec in the interim.  I will be in touch with them pending the results of the CT chest and further work up and follow up can be determined at that time.  Questions and concerns were answered to the patient's satisfaction.  he was provided with my contact information should new questions or concerns arise in the interim.  Up to date on Pneumovax (2010) and a seasonal flu vaccine.  COVID vaccinated.  Tanesha Galloway MD  Pulmonary and Critical Care Medicine    The above note was dictated using voice recognition software and may include typographical errors. Please contact the author for any clarifications.  I spent 48 minutes on the date of encounter doing chart review, review of outside records, review of test results, conducting the patient visit, completing documentation and further activities as noted above.

## 2021-12-27 NOTE — PROGRESS NOTES
Pulmonary Clinic New Patient Consult  Reason for Consult: cough  History of Present Illness  Mr. Ambrocio Delvalle is a 61 yom with a past history of Cerebral Palsy who presents to pulmonary clinic today for further evaluation of cough.  He is accompanied to clinic by his mother who helps provide additional history.    Mr. Delvalle has no previously known history of lung disease.  He states that his cough started in the summer 2020.  His mom had a cough that started around the same time.  This was shortly after the neighbors tree root grew into their septic system and caused some kind of backup.  They believe that this resulted in a mold problem in their house.  When the cough started it was initially productive of clear phlegm.  The phlegm is now a thick and stringy pussy substance that is more white-tan in color.  The cough occurs throughout the day and also at night making it difficult to sleep.  It sounds like the cough will sometimes wake him up at night.  He was previously seen by his primary care doctor for this cough and Zyrtec and Flonase were recommended for possible postnasal drip.  He has continued taking these without appreciable improvement in his cough.  At night when he is coughing his mom will sometimes hear a gurgling or wheezing type sensation.  He denies any fevers, hemoptysis, or history of recurrent pneumonias.  He is not using any inhalers or nebulizers.  He denies any history of heartburn or acid reflux.  No history of allergies or hayfever.  He also notes some occasional difficulty swallowing and a sensation like food gets stuck.  They describe this is being related to thick phlegm coming up from the lungs and coating the food that he is trying to swallow such that it gets stuck and he is unable to swallow it down.    He is a never smoker.  He does not have any significant occupational exposures.  They have no pets at home.  The house does have asbestos siding.  They do not keep birds or  chickens but notes that her next-door neighbor has a chicken coop in their yard which is not far from [place] room.  He denies any significant exposure to down feathers, hot tub or Jacuzzi he uses on a regular basis, or recent travel outside the area.    Family history is notable for a brother who  of lung cancer (positive tobacco exposure).      Review of Systems:  10 of 14 systems reviewed and are negative unless otherwise stated in HPI.    Past Medical History:   Diagnosis Date     Cerebral palsy (H)      Depression, major, in partial remission (H)      High cholesterol      Hypertension      Low HDL (under 40)      Lumbar scoliosis      Lung nodule     fu benign     Microhematuria 2007    ct neg cysto inflammation     Nasal congestion      Nephrolithiasis     urol eval Dr. Beverly     Osteoporosis , fu     dexa 11/10 -1.5 fem neck and -2.9 total hip; fu  and worse, -4.0 hip; seen by Dr. Gale 10/19 and started prolia     Pharyngitis     hosp fsd     Pulmonary HTN (H) 2008    seen on echo, fu  not seen, nl ef, grade 1 dd     Reflex sympathetic dystrophy of lower extremity      Right ventricular hypertrophy 2008 on echo    ?due to sandie, had fu with Dr. Gaitan, never did sleep study, fu echo  no rvh seen     Sweats, sweating, excessive     echo with rvh and pulm htn, ct chest, abd and pelvis with liver cysts and old lung nodule, cosyntropin stim test neg     Urethral stricture     had cysto and ct done for hematuria as well     Vitamin D deficiency        Allergies   Allergen Reactions     Penicillins Hives     Atorvastatin      Muscle aches to generic     Simvastatin      Muscle aches         Current Outpatient Medications:      amLODIPine (NORVASC) 2.5 MG tablet, Take 1 tablet (2.5 mg) by mouth daily, Disp: 90 tablet, Rfl: 3     baclofen (LIORESAL) 20 MG tablet, Take 1 tablet (20 mg) by mouth 5 times daily, Disp: 450 tablet, Rfl: 3     cetirizine (ZYRTEC) 10 MG tablet,  Take 10 mg by mouth daily, Disp: , Rfl:      denosumab (PROLIA) 60 MG/ML SOSY injection, Inject 1 mL (60 mg) Subcutaneous every 6 months, Disp: 1 mL, Rfl: 1     diazepam (VALIUM) 5 MG tablet, TAKE 1 TABLET BY MOUTH NIGHTLY AS NEEDED, Disp: 30 tablet, Rfl: 0     FLONASE 50 MCG/ACT nasal spray, Spray 1 spray into both nostrils daily, Disp: 16 g, Rfl: 5     LIPITOR 20 MG tablet, TAKE 1 TABLET (20 MG) BY MOUTH DAILY *PFIZER BRAND*, Disp: 90 tablet, Rfl: 3     order for DME, Equipment being ordered: Wheelchair  Power wheelchair with tilt and recline function, Disp: 1 Units, Rfl: 0     ORDER FOR DME, Equipment being ordered: Hospital Bed, Disp: 1 Units, Rfl: 0     ORDER FOR DME, Equipment being ordered: Wheelchair, Disp: 1 Units, Rfl: 0     potassium chloride ER (KLOR-CON) 8 MEQ CR tablet, TAKE 2 TABLETS (16 MEQ) BY MOUTH DAILY *PERRIGO BRAND*, Disp: 180 tablet, Rfl: 3     sertraline (ZOLOFT) 100 MG tablet, TAKE 2 TABLETS BY MOUTH DAILY *NORTHSTAR BRAND*, Disp: 180 tablet, Rfl: 3     triamterene-HCTZ (DYAZIDE) 37.5-25 MG capsule, Take 1 capsule by mouth every morning, Disp: 90 capsule, Rfl: 3     ibuprofen (ADVIL/MOTRIN) 200 MG tablet, Take 400 mg by mouth every 8 hours as needed for mild pain (Patient not taking: Reported on 12/27/2021), Disp: , Rfl:     Current Facility-Administered Medications:      denosumab (PROLIA) injection 60 mg, 60 mg, Subcutaneous, Once, Ezekiel Quevedo MD     denosumab (PROLIA) injection 60 mg, 60 mg, Subcutaneous, Q6 Months, Kurtis Gale MD, 60 mg at 12/20/19 1410      Physical Exam:  /85 (BP Location: Right arm, Patient Position: Sitting, Cuff Size: Adult Large)   Pulse 89   SpO2 97%   GENERAL: Well developed, well nourished, alert, and in no apparent distress.  Seated in wheelchair  HEENT: Normocephalic, atraumatic. PERRL, EOMI.   NECK: supple, no obvious masses.  RESP:  Normal respiratory effort.  CTAB.  No rales, wheezes, rhonchi.  No cyanosis or clubbing.  CV: Normal  S1, S2, regular rhythm, normal rate. No murmur.  No LE edema.   ABDOMEN: non-distended.   SKIN: warm and dry. No rash.  NEURO: AAOx3.  Fluent speech.  PSYCH: mentation appears normal.     Results (personally reviewed in clinic today):  PFTs: Extremely limited interpretation due to inability to meet ATS criteria for multiple maneuvers.  He was unable to get in the body box to perform Plath so lung volumes were not obtained.  Ratio 94%, FVC 73%, FEV1 87%, DLCO 65%.  Study possibly suggestive of a restrictive ventilatory defect although lung volumes would be needed for diagnosis.  Possible impairment in gas exchange.  No prior study available for comparison.  Imaging:  Chest x-ray from 11/12: Negative except for evidence of old granulomatous disease.    Assessment and Plan:   Ambrocio Delvalle is a 61 year old male with a history of cerebral palsy and chronic productive cough of approximately 1 years duration.  1. Chronic cough.  CXR is negative except for bilateral calcified granulomas.  PFTs do not really provide additional reliable information due to difficulty with maneuvers and inability to perform pleth.  Acknowledging these limitations, there is possible suggestion of restriction on spirometry.  Based on this, normal CXR and lack of response to treatment for allergic rhinitis/post nasal drip as etiology of cough, I do think it would be reasonable to proceed with HR-CT chest to better evaluate lung parenchyma and to exclude ILD as etiology of cough.  We will proceed with CT chest and be in touch with the patient regarding his results.  Given swallowing difficulties and what sounds like worsening of cough symptoms at night, I would also wonder about acid reflux/chronic aspiration (which was also considered by his PCP). A swallow study appears to have been ordered but has not been done - I will encourage them to complete this as well.  Asthma (+/- mold sensitization) would also be a consideration.  If nothing  obvious on CT chest, I would consider empiric treatment for asthma with ICS to see if this helps.  We may also consider referral to GI for further evaluation of globus sensation and dysphagia.  He should continue Flonase and Zyrtec in the interim.  I will be in touch with them pending the results of the CT chest and further work up and follow up can be determined at that time.  Questions and concerns were answered to the patient's satisfaction.  he was provided with my contact information should new questions or concerns arise in the interim.  Up to date on Pneumovax (2010) and a seasonal flu vaccine.  COVID vaccinated.  Tanesha Galloway MD  Pulmonary and Critical Care Medicine    The above note was dictated using voice recognition software and may include typographical errors. Please contact the author for any clarifications.  I spent 48 minutes on the date of encounter doing chart review, review of outside records, review of test results, conducting the patient visit, completing documentation and further activities as noted above.

## 2021-12-27 NOTE — NURSING NOTE
Chief Complaint   Patient presents with     Chronic Cough       Vitals:    12/27/21 1500   BP: 138/85   BP Location: Right arm   Patient Position: Sitting   Cuff Size: Adult Large   Pulse: 89   SpO2: 97%       There is no height or weight on file to calculate BMI.    Kendy Sharpe MA

## 2022-01-21 DIAGNOSIS — R25.2 SPASM: ICD-10-CM

## 2022-01-21 RX ORDER — BACLOFEN 20 MG/1
20 TABLET ORAL
Qty: 450 TABLET | Refills: 3 | Status: SHIPPED | OUTPATIENT
Start: 2022-01-21 | End: 2022-12-28

## 2022-01-21 NOTE — TELEPHONE ENCOUNTER
Routing refill request to provider for review/approval because:  Drug not on the [facility] refill protocol   Stacy Posadas RN

## 2022-02-25 DIAGNOSIS — R25.2 SPASM: ICD-10-CM

## 2022-02-25 RX ORDER — DIAZEPAM 5 MG
TABLET ORAL
Qty: 30 TABLET | Refills: 0 | Status: SHIPPED | OUTPATIENT
Start: 2022-02-25 | End: 2022-04-05

## 2022-02-25 NOTE — TELEPHONE ENCOUNTER
Routing refill request to provider for review/approval because:  Drug not on the [facility] refill protocol     Last Written Prescription Date:  11/22/21  Last Fill Quantity: 30,  # refills: 0   Last office visit: 10/25/2021 with prescribing provider:  Dr Quevedo    Future Office Visit:            Carmen TENA RN,BSN

## 2022-04-01 DIAGNOSIS — R25.2 SPASM: ICD-10-CM

## 2022-04-05 RX ORDER — DIAZEPAM 5 MG
TABLET ORAL
Qty: 30 TABLET | Refills: 0 | Status: SHIPPED | OUTPATIENT
Start: 2022-04-05 | End: 2022-06-13

## 2022-04-29 ENCOUNTER — TELEPHONE (OUTPATIENT)
Dept: FAMILY MEDICINE | Facility: CLINIC | Age: 62
End: 2022-04-29
Payer: MEDICARE

## 2022-04-29 NOTE — TELEPHONE ENCOUNTER
Prior Authorization Retail Medication Request     Medication/Dose: Lipitor 20 mg  ICD code (if different than what is on RX):  E78.5  Previously Tried and Failed:  Atorvastatin, Simvastatin, Rosuvastatin  Rationale:  Patient developed msucle cramps while on generic Atorvastatin and has had no side effects since switching to brand name     Insurance Name:  [facility]  Insurance ID:  [id number]        Pharmacy Information (if different than what is on RX)  Name:  [facility]  Phone:  [phone]

## 2022-05-04 NOTE — TELEPHONE ENCOUNTER
PRIOR AUTHORIZATION DENIED    Medication: Lipitor 20 mg    Denial Date: 5/4/2022    Denial Rational:  Patient must have a history of trial & failure to the formulary alternative(s) or have a contraindication or intolerance to the formulary alternatives:  ezetimibe tablet, Nexletol tablet & Nexlizet tablet.                   Appeal Information:    If you would like to appeal, please supply P/A team with a letter of medical necessity with clinical reason.

## 2022-05-04 NOTE — TELEPHONE ENCOUNTER
Central Prior Authorization Team   Phone: [phone]    PA Initiation    Medication: Lipitor 20 mg  Insurance Company: [employer] - Phone [phone] Fax [phone]  Pharmacy Filling the Rx: [facility] - [place], [place] - [address] [place] [address]  Filling Pharmacy Phone: [phone]  Filling Pharmacy Fax:    Start Date: 5/4/2022

## 2022-05-05 RX ORDER — ATORVASTATIN CALCIUM 20 MG/1
20 TABLET, FILM COATED ORAL DAILY
Status: CANCELLED | OUTPATIENT
Start: 2022-05-05

## 2022-06-11 DIAGNOSIS — R25.2 SPASM: ICD-10-CM

## 2022-06-13 RX ORDER — DIAZEPAM 5 MG
TABLET ORAL
Qty: 30 TABLET | Refills: 0 | Status: SHIPPED | OUTPATIENT
Start: 2022-06-13 | End: 2022-07-25

## 2022-06-13 NOTE — TELEPHONE ENCOUNTER
Routing refill request to provider for review/approval because:  Drug not on the [facility] refill protocol     Victoriano Johnston RN  [facility] Triage Nurse

## 2022-07-22 DIAGNOSIS — R25.2 SPASM: ICD-10-CM

## 2022-07-25 RX ORDER — DIAZEPAM 5 MG
TABLET ORAL
Qty: 30 TABLET | Refills: 0 | Status: SHIPPED | OUTPATIENT
Start: 2022-07-25 | End: 2022-10-05

## 2022-08-09 ENCOUNTER — TELEPHONE (OUTPATIENT)
Dept: FAMILY MEDICINE | Facility: CLINIC | Age: 62
End: 2022-08-09

## 2022-08-09 DIAGNOSIS — M81.0 AGE-RELATED OSTEOPOROSIS WITHOUT CURRENT PATHOLOGICAL FRACTURE: Primary | ICD-10-CM

## 2022-08-09 DIAGNOSIS — Z92.29 HISTORY OF BISPHOSPHONATE THERAPY: ICD-10-CM

## 2022-08-09 NOTE — TELEPHONE ENCOUNTER
Reason for Call: Request for an order or referral:    Order or referral being requested: Would like an order for prolia injection    Date needed: as soon as possible    Has the patient been seen by the PCP for this problem? YES    Additional comments:     Phone number Patient can be reached at:  Home number on file [phone] (home)    Best Time:      Can we leave a detailed message on this number?  YES    Call taken on 8/9/2022 at 5:20 PM by Mary Coronado PTA     5

## 2022-08-10 ENCOUNTER — TELEPHONE (OUTPATIENT)
Dept: FAMILY MEDICINE | Facility: CLINIC | Age: 62
End: 2022-08-10

## 2022-08-10 DIAGNOSIS — I10 HYPERTENSION: ICD-10-CM

## 2022-08-10 DIAGNOSIS — E55.9 VITAMIN D DEFICIENCY: Primary | ICD-10-CM

## 2022-08-10 DIAGNOSIS — I10 BENIGN ESSENTIAL HYPERTENSION: ICD-10-CM

## 2022-08-10 DIAGNOSIS — E78.5 HYPERLIPIDEMIA LDL GOAL <130: ICD-10-CM

## 2022-08-10 DIAGNOSIS — Z00.00 ENCOUNTER FOR ANNUAL PHYSICAL EXAM: ICD-10-CM

## 2022-08-10 NOTE — TELEPHONE ENCOUNTER
Pended Prolia Order - please sign to MAR     Thank you   Soha OBRIEN, Triage RN  [facility]

## 2022-08-10 NOTE — TELEPHONE ENCOUNTER
Pt has future physical appt 12/22. He wants to have labs prior to visit. Future lab orders pended. Please approve if agree with orders or add any PRN labs.    Please send message back to TC to help schedule lab only visit once lab orders are approved.

## 2022-08-12 ENCOUNTER — ALLIED HEALTH/NURSE VISIT (OUTPATIENT)
Dept: NURSING | Facility: CLINIC | Age: 62
End: 2022-08-12
Payer: MEDICARE

## 2022-08-12 DIAGNOSIS — M81.0 OSTEOPOROSIS, UNSPECIFIED OSTEOPOROSIS TYPE, UNSPECIFIED PATHOLOGICAL FRACTURE PRESENCE: Primary | ICD-10-CM

## 2022-08-12 PROCEDURE — 99207 PR NO CHARGE NURSE ONLY: CPT

## 2022-08-12 PROCEDURE — 96372 THER/PROPH/DIAG INJ SC/IM: CPT | Performed by: INTERNAL MEDICINE

## 2022-08-12 NOTE — PROGRESS NOTES
Clinic Administered Medication Documentation          Prolia Documentation    Prior to injection, verified patient identity using patient's name and date of birth. Medication was administered. Please see MAR and medication order for additional information. Patient instructed to remain in clinic for 15 minutes and report any adverse reaction to staff immediately .    Indication: Prolia  (denosumab) is a prescription medicine used to treat osteoporosis in patients who:     Are at high risk for fracture, meaning patients who have had a fracture related to osteoporosis, or who have multiple risk factors for fracture.    Cannot use another osteoporosis medicine or other osteoporosis medicines did not work well.    The timeline for early/late injections would be 4 weeks early and any time after the 6 month debora. If a patient receives their injection late, then the subsequent injection would be 6 months from the date that they actually received the injection.    When was the last injection?  8/4/2021  Was the last injection at least 6 months ago? Yes  Has the prior authorization been completed?  Yes  Is there an active order (within the past 365 days) in the chart?  Yes  Patient denies any dental work involving the bone (e.g. tooth extraction or dental implants) in the past 4 weeks?  Yes  Patient denies plans for any dental work involving the bone (e.g. tooth extraction or dental implants) in the next 4 weeks? Yes    The following steps were completed to comply with the REMS program for Prolia:    Reviewed information in the Medication Guide and Patient Counseling Chart, including the serious risks of Prolia  and the symptoms of each risk.    Advised patient to seek prompt medical attention if they have signs or symptoms of any of the serious risks.    Provided each patient a copy of the Medication Guide and Patient Brochure.      Was entire vial of medication used? Yes  Vial/Syringe: Syringe  Expiration Date:   10/2024  Was this medication supplied by the patient? No     Lisa Sutherland RN BSN MSN  [facility]

## 2022-08-12 NOTE — TELEPHONE ENCOUNTER
Dr. Quevedo, Are you able to resign this order with today's date and the changed diagnosis for insurance - just need to change primary dx if appropriate, pended as requested.  Blanquita Hercules, RN  [facility] RN Triage Team

## 2022-08-19 ENCOUNTER — HOSPITAL ENCOUNTER (OUTPATIENT)
Dept: CT IMAGING | Facility: CLINIC | Age: 62
Discharge: HOME | End: 2022-08-19
Attending: INTERNAL MEDICINE | Admitting: INTERNAL MEDICINE
Payer: MEDICARE

## 2022-08-19 ENCOUNTER — TELEPHONE (OUTPATIENT)
Dept: PULMONOLOGY | Facility: CLINIC | Age: 62
End: 2022-08-19

## 2022-08-19 DIAGNOSIS — R05.9 COUGH: ICD-10-CM

## 2022-08-19 DIAGNOSIS — R94.2 ABNORMAL PFT: ICD-10-CM

## 2022-08-19 PROCEDURE — 71250 CT THORAX DX C-: CPT | Mod: ME

## 2022-08-19 NOTE — TELEPHONE ENCOUNTER
"----- Message from Tayler Chatterjee RN sent at 8/19/2022 12:14 PM CDT -----  Regarding: FW: histoplasmosis  Can we try to get him in with blake on 8/31? [facility] blocked a spot, maybe we can use that one?     ----- Message -----  From: Fiona Galloway MD  Sent: 8/18/2022   7:57 AM CDT  To: Tayler Chatterjee RN  Subject: FW: histoplasmosis                               Tayler,    It looks like I saw this luzmaria in December and then he didn't schedule his CT and got lost to follow up.  It seems his symptoms are worse now and he needs follow up.  Can you please get him scheduled for follow up?  I'm guessing I have nothing until December.  Do Addis or Blake have something sooner?  Do you think either would be willing to see him?    KP      ----- Message -----  From: Cami Carrera  Sent: 8/17/2022   3:49 PM CDT  To: Fiona Galloway MD  Subject: histoplasmosis                                   Dear Dr Galloway,     I received a call from this patient's mother, Ivette Delvalle, today in the PFT lab.  She had been on the phone for the entire afternoon trying to get a message to you.    Dr. Mark had referred her son, Ambrocio Lazaro to you.  After a 5 month wait they were able to get in for PFTs and a visit with you.  At that time you recommended a \"scan of some sort\" per the  mother.  They were not clear as to who would be scheduling the scan.  They have not heard  anything back about it and Ambrocio's symptoms have gotten significantly worse.  You had suggested that he may be presenting with histoplasmosis.  His mother reports that his symptoms match that.  Their next door neighbor keeps chickens and the coop is only 10 feet from his bedroom window.      I am going to call Mrs. Delvalle back and ask her to schedule a CT scan for Ambrocio as ordered.  Please extend the order and add any additional orders needed.      Thank you   Cami Carrera  PFT Tech  [facility] [place] [facility].        "

## 2022-08-22 NOTE — TELEPHONE ENCOUNTER
LVM for PT to [id number] to schedule an appt with Dr. Lozano for 8.31 at 8:30.  This appt is on hold please schedule.

## 2022-08-30 NOTE — PROGRESS NOTES
Pulmonary Clinic Note    Date of Service: 8/31/2022    CC: chronic cough    A/P:  62M being seen for f/u chronic cough. History today does not sound c/w asthma. No post-nasal gtt. Not on ACE inhibitor. CT chest w/ b/l lower lobe bronchiectasis. No evidence of hypersensitivity pneumonitis or asbestos-related disease on CT. I feel his cough is related to chronic aspiration and silent GERD.     - start omeprazole daily  - recommended to schedule swallow study ordered by Dr. Quevedo    RTC 6mo    History:  62M being seen for f/u chronic cough. Last seen by my colleague Dr. Galloway, 12/2021, who was concerned about asthma vs GERD/aspiration or a combination of both.     Continues to cough. Daily. Productive of stringy clear sputum. No hemoptysis. No SOB. No wheezing. Cough does wake from sleep. Is very fatigued, but again, denies any dyspnea as a contributor. Denies GERD. No post-nasal gtt.     Smoking: never     Bird exposure: neighbors w/ chickens              Animal exposure: no pets at home        Inhalation exposure: chickens and asbestos, no other significant exposures                            10 point review of systems negative, aside from that mentioned in HPI.    BP (!) 146/86 (BP Location: Left arm, Patient Position: Sitting, Cuff Size: Adult Regular)   Pulse 80   Wt 73.9 kg (163 lb)   SpO2 97%   BMI 29.81 kg/m    Gen: well-appearing  HEENT: Mallampati IV  Card: RRR  Pulm: clear bilaterally   Abd: soft  MSK: no edema  Skin: no obvious rash  Psych: normal affect  Neuro: alert and oriented     Labs:  Personally reviewed  Abs eos (8/2021) - 100    Imaging/Studies: Personally reviewed  HRCT (8/2022) - moderate areas of bronchiectasis and mild bronchial wall thickening  PFTs (12/2021) - difficulty w/ testing, may represent restriction, mild diffusion defect    TTE (12/2016) - EF 60-65%, grade I diastolic dysfunction    Past Medical History:   Diagnosis Date     Cerebral palsy (H)      Depression, major, in  partial remission (H)      High cholesterol      Hypertension      Low HDL (under 40)      Lumbar scoliosis      Lung nodule 2002    fu benign     Microhematuria 2007    ct neg cysto inflammation     Nasal congestion      Nephrolithiasis 1986    urol eval Dr. Beverly     Osteoporosis 2006, fu 2010    dexa 11/10 -1.5 fem neck and -2.9 total hip; fu 2016 and worse, -4.0 hip; seen by Dr. Gale 10/19 and started prolia     Pharyngitis 2002    hosp fsd     Pulmonary HTN (H) 2008    seen on echo, fu 12/16 not seen, nl ef, grade 1 dd     Reflex sympathetic dystrophy of lower extremity      Right ventricular hypertrophy 2008 on echo    ?due to sandie, had fu with Dr. Gaitan, never did sleep study, fu echo 12/16 no rvh seen     Sweats, sweating, excessive 2008    echo with rvh and pulm htn, ct chest, abd and pelvis with liver cysts and old lung nodule, cosyntropin stim test neg     Urethral stricture 2007    had cysto and ct done for hematuria as well     Vitamin D deficiency      Past Surgical History:   Procedure Laterality Date     GENITOURINARY SURGERY      lithortipsy,  ureteral widened     ORTHOPEDIC SURGERY      left knee- abductors lengthed heel cords lengthened, tendons trransferred , patella lowered,     Family History   Problem Relation Age of Onset     Prostate Cancer Father      Social History     Socioeconomic History     Marital status: Single     Spouse name: Not on file     Number of children: o     Years of education: Not on file     Highest education level: Not on file   Occupational History     Employer: DISABLED   Tobacco Use     Smoking status: Never Smoker     Smokeless tobacco: Never Used   Substance and Sexual Activity     Alcohol use: No     Alcohol/week: 0.0 standard drinks     Drug use: No     Sexual activity: Not Currently   Other Topics Concern     Not on file   Social History Narrative     Not on file     Social Determinants of Health     Financial Resource Strain: Low Risk      Difficulty of  Paying Living Expenses: Not hard at all   Food Insecurity: No Food Insecurity     Worried About Running Out of Food in the Last Year: Never true     Ran Out of Food in the Last Year: Never true   Transportation Needs: No Transportation Needs     Lack of Transportation (Medical): No     Lack of Transportation (Non-Medical): No   Physical Activity: Not on file   Stress: Not on file   Social Connections: Not on file   Intimate Partner Violence: Not on file   Housing Stability: Not on file       35 minutes spent reviewing chart, reviewing test results, talking with and examining patient, formulating plan, and documentation on the day of the encounter.    Tristan Lozano MD  Pulmonary and Critical Care Medicine  [facility]

## 2022-08-31 ENCOUNTER — OFFICE VISIT (OUTPATIENT)
Dept: PULMONOLOGY | Facility: CLINIC | Age: 62
End: 2022-08-31
Payer: MEDICARE

## 2022-08-31 VITALS
HEART RATE: 80 BPM | BODY MASS INDEX: 29.81 KG/M2 | DIASTOLIC BLOOD PRESSURE: 86 MMHG | WEIGHT: 163 LBS | SYSTOLIC BLOOD PRESSURE: 146 MMHG | OXYGEN SATURATION: 97 %

## 2022-08-31 DIAGNOSIS — R05.3 CHRONIC COUGH: Primary | ICD-10-CM

## 2022-08-31 PROCEDURE — 99214 OFFICE O/P EST MOD 30 MIN: CPT | Performed by: STUDENT IN AN ORGANIZED HEALTH CARE EDUCATION/TRAINING PROGRAM

## 2022-08-31 NOTE — LETTER
8/31/2022         RE: Ambrocio Delvalle  [address] [facility] [id number]        Dear Colleague,    Thank you for referring your patient, Ambrocio Delvalle, to the [facility] [place]. Please see a copy of my visit note below.    Pulmonary Clinic Note    Date of Service: 8/31/2022    CC: chronic cough    A/P:  62M being seen for f/u chronic cough. History today does not sound c/w asthma. No post-nasal gtt. Not on ACE inhibitor. CT chest w/ b/l lower lobe bronchiectasis. No evidence of hypersensitivity pneumonitis or asbestos-related disease on CT. I feel his cough is related to chronic aspiration and silent GERD.     - start omeprazole daily  - recommended to schedule swallow study ordered by Dr. Quevedo    RTC 6mo    History:  62M being seen for f/u chronic cough. Last seen by my colleague Dr. Galloway, 12/2021, who was concerned about asthma vs GERD/aspiration or a combination of both.     Continues to cough. Daily. Productive of stringy clear sputum. No hemoptysis. No SOB. No wheezing. Cough does wake from sleep. Is very fatigued, but again, denies any dyspnea as a contributor. Denies GERD. No post-nasal gtt.     Smoking: never     Bird exposure: neighbors w/ chickens              Animal exposure: no pets at home        Inhalation exposure: chickens and asbestos, no other significant exposures                            10 point review of systems negative, aside from that mentioned in HPI.    BP (!) 146/86 (BP Location: Left arm, Patient Position: Sitting, Cuff Size: Adult Regular)   Pulse 80   Wt 73.9 kg (163 lb)   SpO2 97%   BMI 29.81 kg/m    Gen: well-appearing  HEENT: Mallampati IV  Card: RRR  Pulm: clear bilaterally   Abd: soft  MSK: no edema  Skin: no obvious rash  Psych: normal affect  Neuro: alert and oriented     Labs:  Personally reviewed  Abs eos (8/2021) - 100    Imaging/Studies: Personally reviewed  HRCT (8/2022) - moderate areas of bronchiectasis and mild  bronchial wall thickening  PFTs (12/2021) - difficulty w/ testing, may represent restriction, mild diffusion defect    TTE (12/2016) - EF 60-65%, grade I diastolic dysfunction    Past Medical History:   Diagnosis Date     Cerebral palsy (H)      Depression, major, in partial remission (H)      High cholesterol      Hypertension      Low HDL (under 40)      Lumbar scoliosis      Lung nodule 2002    fu benign     Microhematuria 2007    ct neg cysto inflammation     Nasal congestion      Nephrolithiasis 1986    urol eval Dr. Beverly     Osteoporosis 2006, fu 2010    dexa 11/10 -1.5 fem neck and -2.9 total hip; fu 2016 and worse, -4.0 hip; seen by Dr. Gale 10/19 and started prolia     Pharyngitis 2002    hosp fsd     Pulmonary HTN (H) 2008    seen on echo, fu 12/16 not seen, nl ef, grade 1 dd     Reflex sympathetic dystrophy of lower extremity      Right ventricular hypertrophy 2008 on echo    ?due to sandie, had fu with Dr. Gaitan, never did sleep study, fu echo 12/16 no rvh seen     Sweats, sweating, excessive 2008    echo with rvh and pulm htn, ct chest, abd and pelvis with liver cysts and old lung nodule, cosyntropin stim test neg     Urethral stricture 2007    had cysto and ct done for hematuria as well     Vitamin D deficiency      Past Surgical History:   Procedure Laterality Date     GENITOURINARY SURGERY      lithortipsy,  ureteral widened     ORTHOPEDIC SURGERY      left knee- abductors lengthed heel cords lengthened, tendons trransferred , patella lowered,     Family History   Problem Relation Age of Onset     Prostate Cancer Father      Social History     Socioeconomic History     Marital status: Single     Spouse name: Not on file     Number of children: o     Years of education: Not on file     Highest education level: Not on file   Occupational History     Employer: DISABLED   Tobacco Use     Smoking status: Never Smoker     Smokeless tobacco: Never Used   Substance and Sexual Activity     Alcohol use: No      Alcohol/week: 0.0 standard drinks     Drug use: No     Sexual activity: Not Currently   Other Topics Concern     Not on file   Social History Narrative     Not on file     Social Determinants of Health     Financial Resource Strain: Low Risk      Difficulty of Paying Living Expenses: Not hard at all   Food Insecurity: No Food Insecurity     Worried About Running Out of Food in the Last Year: Never true     Ran Out of Food in the Last Year: Never true   Transportation Needs: No Transportation Needs     Lack of Transportation (Medical): No     Lack of Transportation (Non-Medical): No   Physical Activity: Not on file   Stress: Not on file   Social Connections: Not on file   Intimate Partner Violence: Not on file   Housing Stability: Not on file       35 minutes spent reviewing chart, reviewing test results, talking with and examining patient, formulating plan, and documentation on the day of the encounter.    Tristan Lozano MD  Pulmonary and Critical Care Medicine  [facility]

## 2022-08-31 NOTE — PATIENT INSTRUCTIONS
Your chronic cough seems related to chronic aspiration and acid reflux. I would like to start you on an acid blocking medication and I recommend you schedule your swallow study. I will see you back in 6 months.

## 2022-08-31 NOTE — NURSING NOTE
No chief complaint on file.      Vitals:    08/31/22 0825   BP: (!) 146/86   BP Location: Left arm   Patient Position: Sitting   Cuff Size: Adult Regular   Pulse: 80   SpO2: 97%   Weight: 73.9 kg (163 lb)       Body mass index is 29.81 kg/m .      EDGARD Torres

## 2022-09-16 ENCOUNTER — TELEPHONE (OUTPATIENT)
Dept: FAMILY MEDICINE | Facility: CLINIC | Age: 62
End: 2022-09-16

## 2022-09-16 DIAGNOSIS — G80.1 SPASTIC DIPLEGIC CEREBRAL PALSY (H): Primary | ICD-10-CM

## 2022-09-16 NOTE — TELEPHONE ENCOUNTER
Order/Referral Request    Who is requesting: Patient    Orders being requested: Wheelchair, bed, shower chair    Reason service is needed/diagnosis: Pt states that he has talked to Dr. Quevedo about these requests    When are orders needed by: As soon as possible. Pt and his mother, Ivette, state frustration with how long this process has been taking and feel as though they have exhausted all of their options with trying to get an evaluation for appropriate equipment. Pt states that he has contacted  [employer], [facility] [employer], and [facility] [employer] to try to set up an evaluation.     Has this been discussed with Provider: Yes    Does patient have a preference on a Group/Provider/Facility? [facility]     Does patient have an appointment scheduled?: No    Where to send orders: Fax    Could we send this information to you [facility] or would you prefer to receive a phone call?:   Patient would prefer a phone call   Okay to leave a detailed message?: Yes at Cell number on file:    Telephone Information:   Mobile [phone]     Viktoria Burden,  [facility]

## 2022-09-19 NOTE — TELEPHONE ENCOUNTER
Spoke with mother Ivette and she called  [facility] and they suggested in home therapist to evaluate equipment at home to see what the needs might be for updating.     Mother feels that the therapist from [facility] [place]  did not do a satisfactory job in the wheelchair suggested.   Went back to using old chair which is falling apart.      Mother Ivette does not feel a used refurbished bed from [facility] is going to fit the needs of Ambrocio.  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     Ivette has always like [facility] if a referral could be placed for them thru [employer] .    Routing to Dr. Quevedo (previous message not routed)     Antonietta DAVILA MA

## 2022-09-21 NOTE — TELEPHONE ENCOUNTER
"Patient and patient's mother calling. They state they need a new wheelchair, bed, and shower chair.     They state \"Years ago we got equipment prescribed by [facility] who is now [facility]. Jhon  suggested in-home therapist come in to assess needs.\" Patient and mother want to know if [facility] has an equivalent to [facility] with [facility] or if they can be referred to [facility].     They do not want [employer] or [employer] [employer] \"because they are sales people. We want a therapist to do this.\"    Routing to provide and care coordination to please review and advise if [facility] has an equivalent to [facility] or if they can be referred to [facility]. Does patient need a face-to-face visit for this?     Thank you!    Lisa Sutherland, RN BSN MSN  [facility]            "

## 2022-09-21 NOTE — TELEPHONE ENCOUNTER
No C2C on file. Mother Ivette answered. States she is with pt in a bus. Asked triage to call pt back in 10 minutes.

## 2022-09-23 NOTE — TELEPHONE ENCOUNTER
Ezekiel Quevedo MD Lang, Stephanie J, RN 2 days ago     PG    Done     Thank you     AMALIA Montoya RN

## 2022-09-29 ENCOUNTER — ALLIED HEALTH/NURSE VISIT (OUTPATIENT)
Dept: FAMILY MEDICINE | Facility: CLINIC | Age: 62
End: 2022-09-29
Payer: MEDICARE

## 2022-09-29 ENCOUNTER — HOSPITAL ENCOUNTER (EMERGENCY)
Facility: CLINIC | Age: 62
Discharge: HOME | End: 2022-09-29
Attending: EMERGENCY MEDICINE | Admitting: EMERGENCY MEDICINE
Payer: COMMERCIAL

## 2022-09-29 ENCOUNTER — APPOINTMENT (OUTPATIENT)
Dept: GENERAL RADIOLOGY | Facility: CLINIC | Age: 62
End: 2022-09-29
Attending: EMERGENCY MEDICINE
Payer: COMMERCIAL

## 2022-09-29 ENCOUNTER — TELEPHONE (OUTPATIENT)
Dept: FAMILY MEDICINE | Facility: CLINIC | Age: 62
End: 2022-09-29

## 2022-09-29 VITALS
OXYGEN SATURATION: 95 % | DIASTOLIC BLOOD PRESSURE: 69 MMHG | SYSTOLIC BLOOD PRESSURE: 183 MMHG | TEMPERATURE: 99.2 F | HEART RATE: 89 BPM | RESPIRATION RATE: 17 BRPM

## 2022-09-29 DIAGNOSIS — S82.301A CLOSED FRACTURE OF DISTAL END OF RIGHT TIBIA, UNSPECIFIED FRACTURE MORPHOLOGY, INITIAL ENCOUNTER: ICD-10-CM

## 2022-09-29 DIAGNOSIS — Z23 NEED FOR PROPHYLACTIC VACCINATION AND INOCULATION AGAINST INFLUENZA: Primary | ICD-10-CM

## 2022-09-29 PROCEDURE — 250N000013 HC RX MED GY IP 250 OP 250 PS 637: Performed by: EMERGENCY MEDICINE

## 2022-09-29 PROCEDURE — 73630 X-RAY EXAM OF FOOT: CPT | Mod: 50

## 2022-09-29 PROCEDURE — G0008 ADMIN INFLUENZA VIRUS VAC: HCPCS

## 2022-09-29 PROCEDURE — 90682 RIV4 VACC RECOMBINANT DNA IM: CPT

## 2022-09-29 PROCEDURE — 99207 PR NO CHARGE NURSE ONLY: CPT

## 2022-09-29 PROCEDURE — 99285 EMERGENCY DEPT VISIT HI MDM: CPT | Mod: 25

## 2022-09-29 PROCEDURE — 27530 TREAT KNEE FRACTURE: CPT | Mod: RT

## 2022-09-29 PROCEDURE — 73560 X-RAY EXAM OF KNEE 1 OR 2: CPT | Mod: 50

## 2022-09-29 PROCEDURE — 73610 X-RAY EXAM OF ANKLE: CPT | Mod: 50

## 2022-09-29 RX ORDER — BACLOFEN 10 MG/1
5 TABLET ORAL ONCE
Status: COMPLETED | OUTPATIENT
Start: 2022-09-29 | End: 2022-09-29

## 2022-09-29 RX ORDER — ACETAMINOPHEN 325 MG/1
650 TABLET ORAL ONCE
Status: COMPLETED | OUTPATIENT
Start: 2022-09-29 | End: 2022-09-29

## 2022-09-29 RX ADMIN — ACETAMINOPHEN 650 MG: 325 TABLET ORAL at 17:08

## 2022-09-29 RX ADMIN — BACLOFEN 5 MG: 10 TABLET ORAL at 20:12

## 2022-09-29 ASSESSMENT — ENCOUNTER SYMPTOMS
NUMBNESS: 0
WOUND: 0
ARTHRALGIAS: 1

## 2022-09-29 ASSESSMENT — ACTIVITIES OF DAILY LIVING (ADL)
ADLS_ACUITY_SCORE: 35
ADLS_ACUITY_SCORE: 33
ADLS_ACUITY_SCORE: 35

## 2022-09-29 NOTE — TELEPHONE ENCOUNTER
Patient has been sitting at ED since about 11:10 am today. Patient was injured while being unloaded by [employer].     Patient has been sitting in his wheel chair since then. Mother states that he is usually only able to sit in the chair for an hour and a half at a time.  Has not taken baclofen yet today.    Mother states that she spoke with the people at the desk several times. She states that people come and go and patient is not being seen.     While writer was on the phone, patient was called to a room. Arabella Shelby RN

## 2022-09-29 NOTE — ED NOTES
Patient and wife states patient is having pain from his ankles to his knee. Wife is requesting x-rays to be taken of the legs and knees. Wife reports that she was told patient can have his x-rays done in wheel chair. Wife informed that is not the case due to positioning challenges of his legs and the metal in the chair interfering with appropriate x-rays.    Wife also complaining of patient getting tired due to being in his chair for so long.

## 2022-09-29 NOTE — ED NOTES
"Jamie from xray reports \"that patient and mother are reporting dissatisfaction with direction of care and would like speak with a dr or nurse prior to imaging\". Refusing at this time.   "

## 2022-09-29 NOTE — ED PROVIDER NOTES
History     Chief Complaint:  Foot Injury       HPI   Ambrocio Delvalle is a 62 year old male who presents with bilateral lower extremity pain, right greater than left, after injury while getting out of [employer] vehicle.  Patient has history of cerebral palsy and is not ambulatory at baseline.  While attempting to get the patient's wheelchair out of the vehicle the patient's legs were twisted in an unusual fashion causing sudden onset pain to both lower extremities greatest in the right ankle.  Patient has history of osteopenia and notes previous fractures with minimal trauma.  Notes tenderness to palpation and pain to the right ankle.  Baseline sensation and motor function to lower extremities.    ROS:  Review of Systems   Musculoskeletal: Positive for arthralgias.   Skin: Negative for wound.   Neurological: Negative for numbness.   All other systems reviewed and are negative.      Allergies:  Penicillins  Atorvastatin  Simvastatin     Medications:    amLODIPine (NORVASC) 2.5 MG tablet  baclofen (LIORESAL) 20 MG tablet  cetirizine (ZYRTEC) 10 MG tablet  denosumab (PROLIA) 60 MG/ML SOSY injection  diazepam (VALIUM) 5 MG tablet  FLONASE 50 MCG/ACT nasal spray  ibuprofen (ADVIL/MOTRIN) 200 MG tablet  LIPITOR 20 MG tablet  omeprazole (PRILOSEC) 20 MG DR capsule  order for DME  ORDER FOR DME  ORDER FOR DME  potassium chloride ER (KLOR-CON) 8 MEQ CR tablet  sertraline (ZOLOFT) 100 MG tablet  triamterene-HCTZ (DYAZIDE) 37.5-25 MG capsule  Vitamin D3 (CHOLECALCIFEROL) 125 MCG (5000 UT) tablet        Past Medical History:    Past Medical History:   Diagnosis Date     Cerebral palsy (H)      Depression, major, in partial remission (H)      High cholesterol      Hypertension      Low HDL (under 40)      Lumbar scoliosis      Lung nodule 2002     Microhematuria 2007     Nasal congestion      Nephrolithiasis 1986     Osteoporosis 2006, fu 2010     Pharyngitis 2002     Pulmonary HTN (H) 2008     Reflex sympathetic  dystrophy of lower extremity      Right ventricular hypertrophy 2008 on echo     Sweats, sweating, excessive 2008     Urethral stricture 2007     Vitamin D deficiency        Past Surgical History:    Past Surgical History:   Procedure Laterality Date     GENITOURINARY SURGERY      lithortipsy,  ureteral widened     ORTHOPEDIC SURGERY      left knee- abductors lengthed heel cords lengthened, tendons trransferred , patella lowered,        Family History:    family history includes Prostate Cancer in his father.    Social History:   reports that he has never smoked. He has never used smokeless tobacco. He reports that he does not drink alcohol and does not use drugs.  PCP: Ezekiel Quevedo     Physical Exam     Patient Vitals for the past 24 hrs:   BP Temp Temp src Pulse Resp SpO2   09/29/22 1309 -- 99.2  F (37.3  C) Temporal -- -- --   09/29/22 1308 (!) 183/69 -- -- -- -- --   09/29/22 1305 -- -- -- 89 17 95 %        Physical Exam  General: Patient in mild distress.  Alert and cooperative with exam. Normal mentation  HEENT: NC/AT. Conjunctiva without injection or scleral icterus. External ears normal.  Respiratory: Breathing comfortably on room air  CV: Normal rate, all extremities well perfused  GI:  Non-distended abdomen  Skin: Warm, dry, no rashes/open wounds on exposed skin  Musculoskeletal: Lower extremities: Atrophy and clubfeet bilaterally.  Right lower extremity with tenderness at the distal tibia medially with mild swelling/bruising to the distal foot.  Knee joint stable without evidence of significant effusion or instability.  Left lower extremity demonstrates mild bruising/swelling to the distal foot.  CMS at baseline to lower extremities.  Neuro: Alert, answers questions appropriately. No gross motor deficits      Emergency Department Course     Imaging:  XR Knee Bilateral 1/2 Views   Final Result   IMPRESSION:    Postoperative changes to the anterior tibial tubercle bilaterally. Diffuse  demineralization. No evidence for acute fracture on either side. Tiny right knee joint effusion.      XR Foot Bilateral G/E 3 Views   Final Result   IMPRESSION: There is an oblique fracture of the distal right tibia without significant angulation deformity or foreshortening. Diffuse demineralization bilaterally. There is abnormal angulation of both tibiotalar joints and subtalar joints. Bilateral pes    cavus. Findings are likely secondary to chronic neuromuscular disease, and correlation is recommended. No additional fractures are identified. Advanced degenerative change at both 1st MTP joints.         XR Ankle Bilateral G/E 3 Views   Final Result   IMPRESSION: There is an oblique fracture of the distal right tibia without significant angulation deformity or foreshortening. Diffuse demineralization bilaterally. There is abnormal angulation of both tibiotalar joints and subtalar joints. Bilateral pes    cavus. Findings are likely secondary to chronic neuromuscular disease, and correlation is recommended. No additional fractures are identified. Advanced degenerative change at both 1st MTP joints.            Report per radiology      Emergency Department Course:    Reviewed:  I reviewed nursing notes, vitals and past medical history    Interventions:  Medications   acetaminophen (TYLENOL) tablet 650 mg (650 mg Oral Given 9/29/22 1708)        Disposition:  The patient was discharged to home.     Impression & Plan      Medical Decision Making:  Patient is a 62-year-old male with history of cerebral palsy (nonambulatory) who presents with injury to his lower extremities while getting out of [employer] vehicle.  On evaluation patient has mild tenderness to his right medial ankle and mild bruising/swelling to his feet bilaterally.  Imaging obtained as above demonstrates right distal tibia fracture which is not significantly displaced.  No evidence of other significant injury on exam or imaging.  Given patient's  nonambulatory status and issues with muscle spasms, rather than splinting, patient's right lower extremity was placed in cam boot.  I did recommend close follow-up with orthopedics for continued care.  Supportive care and return precautions were discussed.  Patient discharged with his mother.    Diagnosis:    ICD-10-CM    1. Closed fracture of distal end of right tibia, unspecified fracture morphology, initial encounter  S82.301A            Jacob Meyer, DO  09/29/22 2249

## 2022-09-29 NOTE — ED TRIAGE NOTES
Patient was getting out of a [employer] van when his feet got caught on the wheelchair lift and got pulled on aggressively. Patient non- weight bearing, wheelchair bound. Pt reports bi lateral ankle and foot pain.     Triage Assessment     Row Name 09/29/22 1801       Triage Assessment (Adult)    Airway WDL WDL       Respiratory WDL    Respiratory WDL WDL       Skin Circulation/Temperature WDL    Skin Circulation/Temperature WDL WDL       Cardiac WDL    Cardiac WDL WDL       Peripheral/Neurovascular WDL    Peripheral Neurovascular WDL WDL       Cognitive/Neuro/Behavioral WDL    Cognitive/Neuro/Behavioral WDL WDL

## 2022-09-29 NOTE — DISCHARGE INSTRUCTIONS
Recommend Tylenol and/or ibuprofen as needed for pain control  Ice area pain, 20 minutes, 4 times per day

## 2022-10-04 DIAGNOSIS — R25.2 SPASM: ICD-10-CM

## 2022-10-05 RX ORDER — DIAZEPAM 5 MG
TABLET ORAL
Qty: 30 TABLET | Refills: 0 | Status: SHIPPED | OUTPATIENT
Start: 2022-10-05 | End: 2022-11-23

## 2022-11-02 DIAGNOSIS — I10 BENIGN ESSENTIAL HYPERTENSION: ICD-10-CM

## 2022-11-04 RX ORDER — TRIAMTERENE AND HYDROCHLOROTHIAZIDE 37.5; 25 MG/1; MG/1
CAPSULE ORAL
Qty: 90 CAPSULE | Refills: 3 | Status: SHIPPED | OUTPATIENT
Start: 2022-11-04 | End: 2022-12-28

## 2022-11-04 NOTE — TELEPHONE ENCOUNTER
Routing refill request to provider for review/approval because:  BP Readings from Last 6 Encounters:   09/29/22 (!) 183/69   08/31/22 (!) 146/86   12/27/21 138/85   10/25/21 (!) 152/84   07/19/21 (!) 169/100   03/12/21 (!) 175/104       Blanquita Hercules RN

## 2022-11-09 NOTE — TELEPHONE ENCOUNTER
Medication is being filled for 1 time refill only due to:  Patient needs labs at upcoming OV. . Patient needs to be seen because it has been more than one year since last visit.     Caremn TENA RN,BSN       [Negative] : Skin

## 2022-11-23 DIAGNOSIS — R25.2 SPASM: ICD-10-CM

## 2022-11-23 RX ORDER — DIAZEPAM 5 MG
TABLET ORAL
Qty: 30 TABLET | Refills: 0 | Status: SHIPPED | OUTPATIENT
Start: 2022-11-23 | End: 2023-01-06

## 2022-11-29 ENCOUNTER — TELEPHONE (OUTPATIENT)
Dept: FAMILY MEDICINE | Facility: CLINIC | Age: 62
End: 2022-11-29

## 2022-11-29 NOTE — TELEPHONE ENCOUNTER
"Pt called with wife on line     Sept 29th - patient was injured trying to get into building by [employer]     PCP called ambulance to take him to emergency room     [facility] could assess equipment he needs     But would need order for provider to do so - then would be out within 48 hours     Requesting: \"ambulatory consult to home care physical therapy\"     Reason: Needs bed with trapeze with \"shorter arm rests\" and special type of mattress - pt is in bed with cast always     [facility] did not provide patient with the fax number     Did advise F2F needed for Medicare reimbursement generally     Please advise    Appointments in Next Year    Dec 20, 2022 11:45 AM  LAB with CS LAB  [facility] ([facility] ) [phone]   Dec 22, 2022 11:30 AM  (Arrive by 11:10 AM)  Annual Wellness Visit with Ezekiel Quevedo MD  [facility] ([facility] ) [phone]   Feb 15, 2023 11:00 AM  Return Visit with Tristan Lozano MD  [facility] [place] ([facility] ) [phone]        Anny Pena RN  [facility]     "

## 2022-12-01 ENCOUNTER — TELEPHONE (OUTPATIENT)
Dept: ENDOCRINOLOGY | Facility: CLINIC | Age: 62
End: 2022-12-01

## 2022-12-01 NOTE — TELEPHONE ENCOUNTER
[facility] Call Center    Phone Message    May a detailed message be left on voicemail: yes     Reason for Call: Other: .      Per Patients Mother, Ivette is wanting to get a call back. Ivette states patient had an accident with [employer]. Ivette states patient has a tooth infection and patient is currently on Prolia and not able to get the tooth pulled. Ivette states patient is needing to see Dr. Gale from the accident. Ivette states patient cannot wait till April 2023, which was the first availability to be seen. Ivette states patient cannot stand or bare weight.  Please advise.       Action Taken: Message routed to:  [facility] ([facility] Endo    Travel Screening: Not Applicable

## 2022-12-02 NOTE — TELEPHONE ENCOUNTER
Message noted.  Details of patient's recent health issues unclear to me.  I saw him for an endocrinology evaluation over 3 years ago (10/2019), but no followup.    Unfortunately, I have no appointment options in the next few months. I recommend he see another endocrinologist at [facility] with in-person or video visit, first available. Please relay message.    ALFONSO Gale MD, MS  Endocrinology  [facility]

## 2022-12-05 ENCOUNTER — TELEPHONE (OUTPATIENT)
Dept: FAMILY MEDICINE | Facility: CLINIC | Age: 62
End: 2022-12-05

## 2022-12-05 NOTE — TELEPHONE ENCOUNTER
Patient's mother called and C2C was given by the patient over the phone. Mother stated frustration and concern regarding multiple different circumstances that has happened. Some of them including that they did not receive the correct wheelchair that they ordered and the difficulty of getting in for an appointment. Patient is currently in the need of a new shower chair and a new hospital bed but they are struggling to obtain these. Mother is wondering if an appointment can be scheduled for her son to see his PCP to talk about these health concerns. Mother stated that it is not possible for them to wait many months for an appointment. She stated that if the provider can not fit them in, they would like suggestions of other places they can go to for care. Please review and advise.    Shayna Gudino Triage Team

## 2022-12-28 ENCOUNTER — VIRTUAL VISIT (OUTPATIENT)
Dept: FAMILY MEDICINE | Facility: CLINIC | Age: 62
End: 2022-12-28
Payer: MEDICARE

## 2022-12-28 ENCOUNTER — TELEPHONE (OUTPATIENT)
Dept: FAMILY MEDICINE | Facility: CLINIC | Age: 62
End: 2022-12-28

## 2022-12-28 DIAGNOSIS — E78.5 HYPERLIPIDEMIA LDL GOAL <130: ICD-10-CM

## 2022-12-28 DIAGNOSIS — R05.3 CHRONIC COUGH: ICD-10-CM

## 2022-12-28 DIAGNOSIS — R25.2 SPASM: ICD-10-CM

## 2022-12-28 DIAGNOSIS — G90.523 COMPLEX REGIONAL PAIN SYNDROME TYPE 1 OF BOTH LOWER EXTREMITIES: ICD-10-CM

## 2022-12-28 DIAGNOSIS — I10 PRIMARY HYPERTENSION: ICD-10-CM

## 2022-12-28 DIAGNOSIS — E55.9 VITAMIN D DEFICIENCY: ICD-10-CM

## 2022-12-28 DIAGNOSIS — G80.1 SPASTIC DIPLEGIC CEREBRAL PALSY (H): ICD-10-CM

## 2022-12-28 DIAGNOSIS — M62.838 MUSCLE SPASM: Primary | ICD-10-CM

## 2022-12-28 DIAGNOSIS — I10 BENIGN ESSENTIAL HYPERTENSION: ICD-10-CM

## 2022-12-28 DIAGNOSIS — F33.41 RECURRENT MAJOR DEPRESSIVE DISORDER, IN PARTIAL REMISSION (H): ICD-10-CM

## 2022-12-28 DIAGNOSIS — M81.0 OSTEOPOROSIS, UNSPECIFIED OSTEOPOROSIS TYPE, UNSPECIFIED PATHOLOGICAL FRACTURE PRESENCE: ICD-10-CM

## 2022-12-28 DIAGNOSIS — G89.29 OTHER CHRONIC PAIN: ICD-10-CM

## 2022-12-28 DIAGNOSIS — M62.838 MUSCLE SPASM: ICD-10-CM

## 2022-12-28 PROCEDURE — 99207 PR NO CHARGE LOS: CPT | Performed by: INTERNAL MEDICINE

## 2022-12-28 RX ORDER — AMLODIPINE BESYLATE 2.5 MG/1
2.5 TABLET ORAL DAILY
Qty: 90 TABLET | Refills: 3 | Status: ON HOLD | OUTPATIENT
Start: 2022-12-28 | End: 2023-08-04

## 2022-12-28 RX ORDER — POTASSIUM CHLORIDE 600 MG/1
TABLET, FILM COATED, EXTENDED RELEASE ORAL
Qty: 180 TABLET | Refills: 3 | Status: CANCELLED | OUTPATIENT
Start: 2022-12-28

## 2022-12-28 RX ORDER — ATORVASTATIN CALCIUM 20 MG
TABLET ORAL
Qty: 90 TABLET | Refills: 3 | Status: ON HOLD | OUTPATIENT
Start: 2022-12-28 | End: 2023-08-05

## 2022-12-28 RX ORDER — TRIAMTERENE AND HYDROCHLOROTHIAZIDE 37.5; 25 MG/1; MG/1
CAPSULE ORAL
Qty: 90 CAPSULE | Refills: 3 | Status: SHIPPED | OUTPATIENT
Start: 2022-12-28 | End: 2024-04-08

## 2022-12-28 RX ORDER — BACLOFEN 20 MG/1
20 TABLET ORAL
Qty: 450 TABLET | Refills: 3 | Status: ON HOLD | OUTPATIENT
Start: 2022-12-28 | End: 2023-08-04

## 2022-12-28 RX ORDER — SERTRALINE HYDROCHLORIDE 100 MG/1
TABLET, FILM COATED ORAL
Qty: 180 TABLET | Refills: 3 | Status: SHIPPED | OUTPATIENT
Start: 2022-12-28 | End: 2024-04-08

## 2022-12-28 RX ORDER — POTASSIUM CHLORIDE 600 MG/1
TABLET, FILM COATED, EXTENDED RELEASE ORAL
Qty: 180 TABLET | Refills: 3 | Status: SHIPPED | OUTPATIENT
Start: 2022-12-28 | End: 2022-12-30

## 2022-12-28 RX ORDER — DIAZEPAM 5 MG
5 TABLET ORAL
Qty: 30 TABLET | Refills: 0 | Status: CANCELLED | OUTPATIENT
Start: 2022-12-28

## 2022-12-28 NOTE — PROGRESS NOTES
Ambrocio is a 62 year old who is being evaluated via a billable video visit.      How would you like to obtain your AVS? Mail a copy  If the video visit is dropped, the invitation should be resent by: Text to cell phone: [phone]  Will anyone else be joining your video visit? No              Subjective   Ambrocio is a 62 year old, presenting for the following health issues:  Orders (Statement of approval for [facility] home therapist for assessment for medical equipment (shower chair and hospital bed) fax [phone])    The patient was not able to do the video so this was done as a phone visit.    He is having pain and muscle spasms of his legs.  Each time he sneezes or coughs his legs jump.  This has worsened since his accident in September with fx leg, seeing Dr. Lizama for that.  He has pains in his hips and knees making it difficult to transfer from the bed into wheelchair and is therefore stuck in bed.  This has also worsened since his fx and emergency room visit in September.  He has not had any therapy.  Is extremely difficult for him to get into the office and he would likely need to transport on a stretcher.  His other medical issues have been stable.  I have not seen him in the office in quite some time.  We need to do his labs.    Last prolia shot 8/12/22.    ASSESSMENT:  1.  Recent fracture with ongoing and worsening pain likely due to fracture with reflex sympathetic dystrophy of his lower extremity in a patient with cerebral palsy.  This is quite serious as noted and he is not able to get in and out of bed because of the pain.  Ultimately I would like Occupational Therapy to see him and we need to get things done so that he can get out of bed and I think they could recommend options for this.  I wanted to do a face-to-face visit today in order to certify him for home care.  They are not able to get their computer working and her cell phone does not support [employer].  We tried a variety of  other modalities for me to see him but we are unable to do so.  I then canceled the home care referral and we let the patient know that if we are unable to do this and he will need to schedule an in person visit.  2. mmp that will require follow up at his in person office visit.  Labs will need to be done.    PLAN:  Schedule in person office visit.    Ezekiel Quevedo M.D.  31 minutes on the day of the encounter doing chart review, history and exam, documentation and further activities as noted above.

## 2022-12-28 NOTE — NURSING NOTE
"During call for check in -   Pt mother expressed concerns and frustratuion w waiting time ER visits w ana lilia.  \"I would not feel certain that I would be left in a place that is somewhere would wanted to be\"  Pt and mother have many complaints and are planning [facility] if [facility] dont help   Thinking of filing complaints would like pcp 2nd opinion   Per Ivette \"I've been taking care of him 62 years. He's not getting the help that he needs\"  Breana Adamson, [facility]     "

## 2022-12-28 NOTE — TELEPHONE ENCOUNTER
Outpatient Medication Detail     Disp Refills Start End PALMER   potassium chloride ER (KLOR-CON) 8 MEQ CR tablet 180 tablet 3 12/28/2022  No   Sig: TAKE 2 TABLETS (16 MEQ) BY MOUTH DAILY *PERRIGO BRAND*     Fax from pharmacy  Perrigo brand is no longer available  Patient is wondering what other  to use?    [facility]    Please communicate with patient/pharmacy    RT Adam (R)

## 2022-12-29 ENCOUNTER — TELEPHONE (OUTPATIENT)
Dept: FAMILY MEDICINE | Facility: CLINIC | Age: 62
End: 2022-12-29

## 2022-12-29 NOTE — TELEPHONE ENCOUNTER
Called and left detailed message regarding MD message below per ok to leave detailed message. Advised they call back if they need a face to face visit.     Mary Mirza RN on 12/29/2022 at 4:14 PM

## 2022-12-29 NOTE — TELEPHONE ENCOUNTER
That is fine but please make sure home care knows that I did not do a face-to-face on this patient it was only a phone visit.    Thank you    Ezekiel Quevedo M.D.

## 2022-12-30 RX ORDER — POTASSIUM CHLORIDE 600 MG/1
TABLET, FILM COATED, EXTENDED RELEASE ORAL
Qty: 180 TABLET | Refills: 3 | Status: SHIPPED | OUTPATIENT
Start: 2022-12-30 | End: 2024-04-08

## 2022-12-30 NOTE — TELEPHONE ENCOUNTER
"To PCP  See Below.     Rx was sent stating, \"Perrigo Brand\" ...in instructions.     RX pended... with \"Perrigo Brand\" removed.. for your Review.      Mary Mirza RN on 12/30/2022 at 11:30 AM        "

## 2023-01-02 ENCOUNTER — MEDICAL CORRESPONDENCE (OUTPATIENT)
Dept: HEALTH INFORMATION MANAGEMENT | Facility: CLINIC | Age: 63
End: 2023-01-02

## 2023-01-03 ENCOUNTER — TELEPHONE (OUTPATIENT)
Dept: FAMILY MEDICINE | Facility: CLINIC | Age: 63
End: 2023-01-03

## 2023-01-03 ENCOUNTER — MEDICAL CORRESPONDENCE (OUTPATIENT)
Dept: HEALTH INFORMATION MANAGEMENT | Facility: CLINIC | Age: 63
End: 2023-01-03

## 2023-01-03 DIAGNOSIS — E55.9 VITAMIN D DEFICIENCY: Primary | ICD-10-CM

## 2023-01-03 DIAGNOSIS — I10 PRIMARY HYPERTENSION: ICD-10-CM

## 2023-01-03 DIAGNOSIS — E78.6 LOW HDL (UNDER 40): ICD-10-CM

## 2023-01-03 DIAGNOSIS — E78.5 HYPERLIPIDEMIA LDL GOAL <130: ICD-10-CM
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
## 2023-01-06 ENCOUNTER — VIRTUAL VISIT (OUTPATIENT)
Dept: FAMILY MEDICINE | Facility: CLINIC | Age: 63
End: 2023-01-06
Payer: MEDICARE

## 2023-01-06 DIAGNOSIS — Z23 NEED FOR COVID-19 VACCINE: ICD-10-CM

## 2023-01-06 DIAGNOSIS — G80.1 SPASTIC DIPLEGIC CEREBRAL PALSY (H): ICD-10-CM

## 2023-01-06 DIAGNOSIS — I10 PRIMARY HYPERTENSION: ICD-10-CM

## 2023-01-06 DIAGNOSIS — E55.9 VITAMIN D DEFICIENCY: ICD-10-CM

## 2023-01-06 DIAGNOSIS — G90.523 COMPLEX REGIONAL PAIN SYNDROME TYPE 1 OF BOTH LOWER EXTREMITIES: ICD-10-CM

## 2023-01-06 DIAGNOSIS — F33.41 RECURRENT MAJOR DEPRESSIVE DISORDER, IN PARTIAL REMISSION (H): ICD-10-CM

## 2023-01-06 DIAGNOSIS — M62.838 MUSCLE SPASM: ICD-10-CM

## 2023-01-06 DIAGNOSIS — G89.29 OTHER CHRONIC PAIN: ICD-10-CM

## 2023-01-06 DIAGNOSIS — M81.0 OSTEOPOROSIS, UNSPECIFIED OSTEOPOROSIS TYPE, UNSPECIFIED PATHOLOGICAL FRACTURE PRESENCE: ICD-10-CM

## 2023-01-06 DIAGNOSIS — E78.5 HYPERLIPIDEMIA LDL GOAL <130: Primary | ICD-10-CM

## 2023-01-06 DIAGNOSIS — R25.2 SPASM: ICD-10-CM

## 2023-01-06 PROCEDURE — 99214 OFFICE O/P EST MOD 30 MIN: CPT | Mod: 95 | Performed by: INTERNAL MEDICINE

## 2023-01-06 RX ORDER — DIAZEPAM 5 MG
5 TABLET ORAL
Qty: 30 TABLET | Refills: 0 | Status: SHIPPED | OUTPATIENT
Start: 2023-01-06 | End: 2023-04-08

## 2023-01-06 RX ORDER — GABAPENTIN 100 MG/1
100 CAPSULE ORAL 3 TIMES DAILY
Qty: 90 CAPSULE | Refills: 0 | Status: SHIPPED | OUTPATIENT
Start: 2023-01-06 | End: 2023-07-29

## 2023-01-06 ASSESSMENT — PATIENT HEALTH QUESTIONNAIRE - PHQ9: SUM OF ALL RESPONSES TO PHQ QUESTIONS 1-9: 6

## 2023-01-06 NOTE — TELEPHONE ENCOUNTER
Call to Radha with [facility] at [phone]. Radha informed of Dr. Quevedo's below response and verbalized understanding.       Lisa Sutherland, RN BSN MSN  [facility]
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
## 2023-01-09 ENCOUNTER — PATIENT OUTREACH (OUTPATIENT)
Dept: CARE COORDINATION | Facility: CLINIC | Age: 63
End: 2023-01-09

## 2023-01-09 NOTE — PROGRESS NOTES
Community Paramedic Program   COVID Vaccine & Booster Clinic    Referred for: bivalent booster    Date and type of last vaccine/booster: 12/16/2021, first monovalent booster    Signed order for Pfizer vaccine or bivalent booster in chart?: yes    Visit scheduled for Wednesday, January 18th at 2:30 pm with CP Tereza    Additional information:     Unable to leave vm on pt's phone because vm box is full. Sent [place] message with my contact information and offered CP visit next week during traveling COVID clinic.     Will attempt to reach pt in 1-2 days by phone.       Bridgette Kumar  Community Health Worker  Community Paramedic program  [phone]  [email or web address]

## 2023-01-10 ENCOUNTER — LAB REQUISITION (OUTPATIENT)
Dept: LAB | Facility: CLINIC | Age: 63
End: 2023-01-10
Payer: MEDICARE

## 2023-01-10 DIAGNOSIS — E55.9 VITAMIN D DEFICIENCY, UNSPECIFIED: ICD-10-CM

## 2023-01-10 DIAGNOSIS — E78.5 HYPERLIPIDEMIA, UNSPECIFIED: ICD-10-CM

## 2023-01-10 DIAGNOSIS — M81.0 AGE-RELATED OSTEOPOROSIS WITHOUT CURRENT PATHOLOGICAL FRACTURE: ICD-10-CM

## 2023-01-10 DIAGNOSIS — I10 ESSENTIAL (PRIMARY) HYPERTENSION: ICD-10-CM

## 2023-01-10 LAB
ALBUMIN SERPL-MCNC: 3.5 G/DL (ref 3.4–5)
ALP SERPL-CCNC: 116 U/L (ref 40–150)
ALT SERPL W P-5'-P-CCNC: 19 U/L (ref 0–70)
ANION GAP SERPL CALCULATED.3IONS-SCNC: 6 MMOL/L (ref 3–14)
AST SERPL W P-5'-P-CCNC: 12 U/L (ref 0–45)
BILIRUB SERPL-MCNC: 1.2 MG/DL (ref 0.2–1.3)
BUN SERPL-MCNC: 22 MG/DL (ref 7–30)
CALCIUM SERPL-MCNC: 8.9 MG/DL (ref 8.5–10.1)
CHLORIDE BLD-SCNC: 105 MMOL/L (ref 94–109)
CHOLEST SERPL-MCNC: 173 MG/DL
CO2 SERPL-SCNC: 31 MMOL/L (ref 20–32)
CREAT SERPL-MCNC: 0.72 MG/DL (ref 0.66–1.25)
ERYTHROCYTE [DISTWIDTH] IN BLOOD BY AUTOMATED COUNT: 13.2 % (ref 10–15)
GFR SERPL CREATININE-BSD FRML MDRD: >90 ML/MIN/1.73M2
GLUCOSE BLD-MCNC: 92 MG/DL (ref 70–99)
HCT VFR BLD AUTO: 51.7 % (ref 40–53)
HDLC SERPL-MCNC: 37 MG/DL
HGB BLD-MCNC: 16.8 G/DL (ref 13.3–17.7)
LDLC SERPL CALC-MCNC: 113 MG/DL
MCH RBC QN AUTO: 29.5 PG (ref 26.5–33)
MCHC RBC AUTO-ENTMCNC: 32.5 G/DL (ref 31.5–36.5)
MCV RBC AUTO: 91 FL (ref 78–100)
NONHDLC SERPL-MCNC: 136 MG/DL
PLATELET # BLD AUTO: 308 10E3/UL (ref 150–450)
POTASSIUM BLD-SCNC: 3.6 MMOL/L (ref 3.4–5.3)
PROT SERPL-MCNC: 7.2 G/DL (ref 6.8–8.8)
RBC # BLD AUTO: 5.7 10E6/UL (ref 4.4–5.9)
SODIUM SERPL-SCNC: 142 MMOL/L (ref 133–144)
TRIGL SERPL-MCNC: 114 MG/DL
TSH SERPL DL<=0.005 MIU/L-ACNC: 2.67 MU/L (ref 0.4–4)
WBC # BLD AUTO: 7.8 10E3/UL (ref 4–11)

## 2023-01-10 PROCEDURE — 80061 LIPID PANEL: CPT | Mod: ORL | Performed by: INTERNAL MEDICINE

## 2023-01-10 PROCEDURE — 82306 VITAMIN D 25 HYDROXY: CPT | Mod: ORL | Performed by: INTERNAL MEDICINE

## 2023-01-10 PROCEDURE — 84443 ASSAY THYROID STIM HORMONE: CPT | Mod: ORL | Performed by: INTERNAL MEDICINE

## 2023-01-10 PROCEDURE — 85027 COMPLETE CBC AUTOMATED: CPT | Mod: ORL | Performed by: INTERNAL MEDICINE

## 2023-01-10 PROCEDURE — 80053 COMPREHEN METABOLIC PANEL: CPT | Mod: ORL | Performed by: INTERNAL MEDICINE

## 2023-01-11 ENCOUNTER — TELEPHONE (OUTPATIENT)
Dept: FAMILY MEDICINE | Facility: CLINIC | Age: 63
End: 2023-01-11

## 2023-01-11 DIAGNOSIS — G90.523 COMPLEX REGIONAL PAIN SYNDROME TYPE 1 OF BOTH LOWER EXTREMITIES: ICD-10-CM

## 2023-01-11 DIAGNOSIS — G89.29 OTHER CHRONIC PAIN: ICD-10-CM

## 2023-01-11 DIAGNOSIS — G80.1 SPASTIC DIPLEGIC CEREBRAL PALSY (H): Primary | ICD-10-CM

## 2023-01-11 LAB — DEPRECATED CALCIDIOL+CALCIFEROL SERPL-MC: 37 UG/L (ref 20–75)

## 2023-01-11 NOTE — TELEPHONE ENCOUNTER
If he has no way of getting this done, then he should go to emergency room via 911    Ezekiel Quevedo M.D.

## 2023-01-11 NOTE — TELEPHONE ENCOUNTER
Called homecare and relayed message on vm.  Tried to call residence, and phone went to voicemail after ringing, voicemail full. Tried twice.    Will need to try home again in morning.  Pt will most likely need to call 911 so he can be evaluated.     Blanquita Hercules, RN  [facility] RN Triage Team

## 2023-01-11 NOTE — PROGRESS NOTES
"Community Paramedic Program   COVID Vaccine & Booster Clinic    Referred for: bivalent booster    Date and type of last vaccine/booster: 12/16/21, first monovalent booster    Signed order for Pfizer vaccine or bivalent booster in chart?: yes    Visit scheduled for Wednesday, January 18th at 2:30 pm with CP Tereza    Additional information:   Spoke with pt's mother Ivette. She shared that she's pt's PCA and caregiver. She confirmed the referral information and expressed appreciation for the CP program so pt can get his COVID booster at home.     Confirmed pt's home address. Ivette said the CP can park in the driveway or on the street and come to the front door when she arrives. Pt's mother wrote down the visit details during the call, including my contact information. I asked her to reach out with questions or if they need to reschedule the visit. She agreed.    Described what Ivette and pt can expect during the visit. She declined having any questions and shared that Ambrocio \"has a few fractures and is in bed most of the time now.\" I thanked her for sharing this and for her help to schedule the visit.      Bridgette Kumar  Community Health Worker  Community Paramedic program  [phone]  [email or web address]      "

## 2023-01-12 ENCOUNTER — PATIENT OUTREACH (OUTPATIENT)
Dept: CARE COORDINATION | Facility: CLINIC | Age: 63
End: 2023-01-12

## 2023-01-12 ENCOUNTER — TELEPHONE (OUTPATIENT)
Dept: FAMILY MEDICINE | Facility: CLINIC | Age: 63
End: 2023-01-12

## 2023-01-12 NOTE — TELEPHONE ENCOUNTER
[facility] spoke with Araseli. She stated that they would like an order for their home care  to see pt. She is requesting Clinic staff call her, so she can take a verbal order over the phone.     [facility] to send message to  triage team.     Vannesa Valdivia, [facility] Care Coordination   [place] [facility]  Social Work Care Coordinator  [phone]

## 2023-01-12 NOTE — TELEPHONE ENCOUNTER
I placed the referral.  Please send that to whoever they would like it sent to.  Please let me know if she has other questions.    With respect to going to the emergency room that was in response to the previous occupational therapist note.  He really does need to get the leg looked at so he should get into see orthopedics as soon as possible.  I would much prefer he not go to the emergency room for this if they can arrange transportation.    Thank you.    Ezekiel Quevedo M.D.

## 2023-01-12 NOTE — TELEPHONE ENCOUNTER
Pt and his mother are returning call.  No CTC on file, but he is there and gives verbal permission to speak to his mother.    Message below relayed to his mother per Dr Quevedo. If unable to get him up and in that much pain, he needs to be seen in the ED and taken there via ambulance, 911. He is on the line as well, on speaker phone.    She stated that his pain medications are helping.  He has been seen on three separate occasions through ortho, and no xray's have been done on the area that is painful. His left hip.  She is upset that they have never xray' ed that side.  His mother stated that she thinks that she may be able to get him up, but due to pain, she would lust like someone to be there with her, when she gets him up and to help, once properly medicated.    She stated that they did not like the OT that was there today, as she was wanting him to do things that he isn't supposed to be doing.  She was supposed to be there for an assessment for equipment, but she stated that it was for OT and not an assessment, and that she couldn't do her job.  His mother is very upset by this.  It was a waste of time.  Trying to get an in home assessment done, to get suggestions for different equipment.  [facility] could come out for this in a timely manner, but Dr Quevedo was wanting someone from [place] to come out instead, [facility].  This OT told her that she could not do the assessment that was needed.   A nurse was there yesterday. They are requesting to have a nurse in again, and then once he is properly medicated, have the nurse there to help, his mother get him up and they can re-evaluate things at that time.    She agrees that if the nurse and herself cannot get him up at that time, they then agree to have him go to the ED via 911. They refuse to go to [facility], as he has had very bad experiences in their ED.   She stated that they will ask him to be taken somewhere else.      Called a pain clinic, and they cannot get him in, until 05/23/2023. He is unable to wait until then.    I advised her that I would take this message, and send it through to the clinic, and have them give her a call back with further care advice, if needed. If pain becomes severe, I advised her to contact 911 and have them take him by ambulance for further assessment at the hospital/ED.      Arabella Blake RN  [facility] Nurse Advisor  1/11/2023 at 6:21 PM

## 2023-01-12 NOTE — RESULT ENCOUNTER NOTE
José Miguel,    You should be able to view your test results which look quite good for the most part.    Your CBC your blood count is normal with no signs of anemia or blood disorders.  Your chemistry panel shows no diabetes.  Your blood salts, kidney tests, liver tests, proteins, vitamin D level, and thyroid test are normal.    Your total cholesterol is very good at 173.  Your HDL cholesterol is just a bit low, the good cholesterol but not a problem.  Your LDL or your bad cholesterol is adequate at 113.    I am happy to bring you this overall excellent report.  Please let me know if you have questions.    Ezekiel Quevedo M.D.
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
## 2023-01-12 NOTE — TELEPHONE ENCOUNTER
[facility] called Araseli and left a vm asking for more information.     If [facility] does not hear from Araseli today, [facility] Neelima Bourgeois will call again tomorrow 1/13/22.    Vannesa Valdivia [facility] Care Coordination   [place] [facility]  Social Work Care Coordinator  [phone]

## 2023-01-12 NOTE — PROGRESS NOTES
Clinic Care Coordination Contact  Care Coordination Clinician Chart Review    Situation: Patient chart reviewed by care coordinator.    Background: Clinic Care Coordination Referral placed by PCP team.    Assessment: Upon chart review, patient is not a candidate for Primary Care Clinic Care Coordination enrollment due to reason stated below:  Home care called needing an order for their SW'er to assess pt.     Plan/Recommendations: Clinic Care Coordination Referral/order cancelled. RN/SW CC will perform no further monitoring/outreaches at this time and will remain available as needed. If new needs arise, a new Care Coordination Referral may be placed.    Vannesa Valdivia [facility] Care Coordination   [facility]  Social Work Care Coordinator  [phone]

## 2023-01-12 NOTE — TELEPHONE ENCOUNTER
Called and spoke to patient and gave him PCP's detailed message below.     Patient would like to speak directly to PCP. Mother very angry and upset regarding unable to talk directly to MD.     Writer advised patient and mother that  PCP was seeing patients in clinic at the moment. Mother continued with verbal aggression regarding care of patient not being met by her standards. Mother states she has concerns and multiple questions. Mother not able to elaborate further on questions when asked and resorted back to previous phone calls stating they were not addressed. Writer went over calls with mother and informed of calls being addressed as it relates to each question or concern relayed. Mother did not have any further questions but continued to resort back to visits from OT and them not being able to assess for DME at their visit.     Writer offered a Telephone visit with PCP for Monday 1/17/23.  Patient accepted.     Mother would like an appointment via phone call sooner.     Please advise.

## 2023-01-12 NOTE — TELEPHONE ENCOUNTER
Patient Contact    Attempt # 2    Was call answered? No.    Both numbers had full mailboxes and was unable to leave a voicemail.    Sent [email or web address] message to call back.    Mary Almanza RN

## 2023-01-12 NOTE — TELEPHONE ENCOUNTER
Araseli RN from [facility] calling requesting referral for Social Work for further programs available through [facility]. RN also stated possible concern for vulnerable adult.       Ok to leave detailed message at [phone]      Josephine LAMAR RN  EP Triage

## 2023-01-12 NOTE — TELEPHONE ENCOUNTER
Patient mother called back with son on line, okay from son to talk to mom     She reiterated the concerned below and states the OT from [facility] had no business being in their home and told them that she did not know why she was there and is not able to do assessments or order equipment     Mother reiterated that the left hip has never been Xrayed even though they have been to the ER and [facility] many times the cause of his pain has never been explored so she does not know if getting him up while on the pain medication will further aggravate the injury    They state the pain is not any worse then their 1/6 visit with Dr. Quevedo so they don't understand why ER is needed now    They are wanting a new OT referral to assess for equipment with deloris pettit and are not wanting [facility] back out to their home     They also would like a call back from Dr. Quevedo himself    informed VAN abraham provider himself will call but someone from team can call back

## 2023-01-14 ENCOUNTER — MEDICAL CORRESPONDENCE (OUTPATIENT)
Dept: HEALTH INFORMATION MANAGEMENT | Facility: CLINIC | Age: 63
End: 2023-01-14

## 2023-01-18 ENCOUNTER — ALLIED HEALTH/NURSE VISIT (OUTPATIENT)
Dept: OTHER | Facility: CLINIC | Age: 63
End: 2023-01-18
Payer: MEDICARE

## 2023-01-18 VITALS
HEART RATE: 84 BPM | DIASTOLIC BLOOD PRESSURE: 82 MMHG | TEMPERATURE: 98.2 F | OXYGEN SATURATION: 98 % | SYSTOLIC BLOOD PRESSURE: 132 MMHG

## 2023-01-18 DIAGNOSIS — Z23 NEED FOR COVID-19 VACCINE: ICD-10-CM

## 2023-01-18 PROCEDURE — 91312 COVID-19 VACCINE BIVALENT BOOSTER 12+ (PFIZER): CPT | Performed by: INTERNAL MEDICINE

## 2023-01-18 PROCEDURE — 99207 PR COMMUNITY PARAMEDIC - PATIENT NOT BILLABLE: CPT

## 2023-01-18 PROCEDURE — 0124A COVID-19 VACCINE BIVALENT BOOSTER 12+ (PFIZER): CPT | Performed by: INTERNAL MEDICINE

## 2023-01-19 ENCOUNTER — TELEPHONE (OUTPATIENT)
Dept: FAMILY MEDICINE | Facility: CLINIC | Age: 63
End: 2023-01-19
Payer: MEDICARE

## 2023-01-19 DIAGNOSIS — M79.661 PAIN OF RIGHT LOWER LEG: Primary | ICD-10-CM

## 2023-01-19 DIAGNOSIS — M25.552 HIP PAIN, LEFT: ICD-10-CM

## 2023-01-19 NOTE — TELEPHONE ENCOUNTER
[facility] PCP:     Rosario from [facility], Dr Land's office called      Pt's mother was calling them trying to get patient in to see provider at [facility]. However. Patient complains of unrelenting hip pain and has been bed bound since 12/23/22. He cannot currently even transfer into a wheelchair.     Patient does not have a way to get into their office. Pt's mom wants orders for x-ray sent to the hospital for patient to have imaging there. But they advised patient would need to be assessed and rule out fracture. He will need to go to the hospital via ambulance to be evaluated as he is a [facility] lift patient     [facility] will call pt's mother and advise they have patient go to the ED for evaluation     Soha OBRIEN, Triage RN  [facility]

## 2023-01-19 NOTE — TELEPHONE ENCOUNTER
Patient called back. Given message from Dr. Quevedo. Patient agrees to schedule x rays at [facility].  Patient states that he needs a stretcher to get to the hospital. Advised patient to call 911 non-emergency once appointment is scheduled. Patient verbalizes understanding and agrees with plan. Arabella Shelby RN

## 2023-01-19 NOTE — TELEPHONE ENCOUNTER
Patient Contact    Attempt # 1    Was call answered? No.    Left message for patient to call triage back.    Mary Almanza RN

## 2023-01-19 NOTE — PROGRESS NOTES
"Community Paramedic One Time Visit    January 18, 2023; 1:32 PM    Ambrocio Delvalle is a 62 year old year old adult being seen at home visit    Present at appointment:  Patient, mother    Vitals:  BP Readings from Last 1 Encounters:   01/18/23 132/82     Pulse Readings from Last 1 Encounters:   01/18/23 84     Wt Readings from Last 1 Encounters:   08/31/22 73.9 kg (163 lb)     Ht Readings from Last 1 Encounters:   07/19/21 1.575 m (5' 2\")         Clinical Concerns:  Current Medical Concerns:  Need for (Vaccination; Lab draw/sample)    Education Provided to patient: Monitor for signs and symptoms of allergic reaction to vaccine, call 911 if needed   Flu Shot given: Yes  COVID Vaccine Given: Yes  Lab draw or specimen collection: No      Plan:     Time spent with patient: 30    The patient meets one or more of the following criteria:  * Requires services to prevent readmission to a nursing home or hospital    Acute concern/Follow-up recommendations: One- time visit, will need new CP referral if additional needs arise    Issues for Provider to follow up on: Community Paramedic visited patient in home, provided one-time visit.     Flu and Covid vaccinations were provided without issues.   "

## 2023-01-19 NOTE — TELEPHONE ENCOUNTER
Please call [facility] and ask what area of xray they would want done if I send patient to hospital for xray and let me know please    Ezekiel Quevedo M.D.

## 2023-01-19 NOTE — TELEPHONE ENCOUNTER
I have ordered the x-rays.  Please let the family know.  If they need help arranging transportation let us know.  Please let them know that they have to call radiology to schedule the x-rays, they cannot just go in.  Please give them the number.  They are to be done at the hospital.    Thank you.    Ezekiel Quevedo M.D.

## 2023-01-19 NOTE — TELEPHONE ENCOUNTER
Received a callback from Dr. Shanda Nguyễn office ([facility]), stating she contacted the patient's Mother and advised the patient go to the emergency room since he has not been out of bed since December 23. Delma states the Mother is refusing to take the patient to the emergency room and Delma advised the Mom to contact the clinic for other options.     Will route OSORIO ZHAO to PCP.     Leonor Gallego RN
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
## 2023-01-19 NOTE — TELEPHONE ENCOUNTER
"Ivette Hickey (pts mom- pt gave verbal consent to speak to his mom about his health information) called the clinic.     When writer asked what this was regarding, pts mom stated \"[facility] is concerned about his pain all over and want him to go to the emergency room, but he is not going to [facility] emergency room to sit for 7hrs. But he can go to [facility] radiology, that's a different story\". Pts mom stated they are looking for instructions from Dr. Quevedo.         Pts mom was making comments to writer during the phone call such as, \"do you want this to go public\", \"do you want me to call an  or the police\"? \"theres going to be hell to pay if he doesn't get proper care\". Informed pts mom that writer is trying to assist her.         Pts mom stated the pt is upset hes shaking and something about were going to give him a heart attack.     Spoke with Dr. Quevedo. Per Dr. Quevedo, pt needs to call 911 now and go to ER now.     Routing to PCP to review and advise.   Please call Ivette back asap with PCPs recommendations   [phone]  "

## 2023-01-19 NOTE — TELEPHONE ENCOUNTER
I called patient just now, his mother answered, I spoke with her and José Miguel.  I reviewed the prior notes from today.  He does not want to go to the emergency room due to prior bad experiences there.  WILLEMO wants xrays and told him he needs to go there to get them as he can not get out of bed on his own.  He would need to call stretcher to get him there.      I explained to the patient and then his mother and then of them that I did not think that this was the optimal way to get care.  He has had great amounts of pain and difficulty getting out of bed because of his joint pains.  I explained to him that my concern is he would go by stretcher and get the x-rays but this will not clinically evaluate him.  I explained that I really thought he needed to be seen in the ER and probably admitted.  That way he could get his x-rays and evaluation in the ER at the least and likely be admitted to have physical therapy, Occupational Therapy, and orthopedic evaluation.  Simply doing the x-rays will not likely tell us what needs to be done and then he will end up having to go in by stretcher a second time.  I told him I did not think this was ideal care but they are insistent about getting the x-rays this way.  They understand the likelihood that he will need to go back yet.  I did order the x-rays.    I spent over 20 minutes on the phone with them today.  And, it was my recommendation that they go by ambulance to the emergency room today.    Ezekiel Quevedo M.D.

## 2023-01-24 ENCOUNTER — HOSPITAL ENCOUNTER (OUTPATIENT)
Dept: GENERAL RADIOLOGY | Facility: CLINIC | Age: 63
Discharge: HOME | End: 2023-01-24
Attending: INTERNAL MEDICINE
Payer: MEDICARE

## 2023-01-24 ENCOUNTER — TELEPHONE (OUTPATIENT)
Dept: FAMILY MEDICINE | Facility: CLINIC | Age: 63
End: 2023-01-24
Payer: MEDICARE

## 2023-01-24 DIAGNOSIS — M25.552 HIP PAIN, LEFT: ICD-10-CM

## 2023-01-24 DIAGNOSIS — M79.661 PAIN OF RIGHT LOWER LEG: ICD-10-CM

## 2023-01-24 PROCEDURE — 73502 X-RAY EXAM HIP UNI 2-3 VIEWS: CPT

## 2023-01-24 PROCEDURE — 73590 X-RAY EXAM OF LOWER LEG: CPT | Mod: RT

## 2023-01-24 PROCEDURE — 73610 X-RAY EXAM OF ANKLE: CPT | Mod: RT

## 2023-01-24 PROCEDURE — 73560 X-RAY EXAM OF KNEE 1 OR 2: CPT | Mod: RT

## 2023-01-24 PROCEDURE — 73630 X-RAY EXAM OF FOOT: CPT | Mod: RT

## 2023-01-24 PROCEDURE — 72100 X-RAY EXAM L-S SPINE 2/3 VWS: CPT

## 2023-01-24 NOTE — RESULT ENCOUNTER NOTE
José Miguel,    You should be able to view your x-ray results.  I have sent a note to Dr. Lizama's office asking him to review the x-rays and get back to me.  I will let you know once he does.  At this point I do not really have any current recommendations.    Ezekiel Quevedo M.D.

## 2023-01-24 NOTE — TELEPHONE ENCOUNTER
Please call Dr Osorio's office, believe his coordinator is Shanon.  The patient is unable to get into his office so we did x-rays at [facility].  The family understands that I have told him they really need to be seen by orthopedics but they insisted on the x-ray.  Could Dr. Lizama please reviewed the x-rays or at least the x-ray report and let me know what he feels the next step is.  The patient is unable to get out of bed due to pain in his hip, knee, and foot I believe.    Please let me know what he says.    Thank you.    Ezekiel Quevedo M.D.

## 2023-01-24 NOTE — TELEPHONE ENCOUNTER
Gloria called back     Several providers are out of the office this week, Dr Lizama is out of office and unable check messages, back 2/6/23    She will check to see if another provider is able/willing to view the images     She said likely follow up will be tomorrow. Will leave in basket to follow up tomorrow     Soha OBRIEN, Triage RN  [facility]

## 2023-01-24 NOTE — TELEPHONE ENCOUNTER
TO PCP:     Called [facility], was transferred to [facility]. Her message states she is out of the office, returning 1/30/23 and in her absence someone else is checking her messages     Left a detailed message regarding patient and with your below request, asking that someone please call our office back regarding this     Soha OBRIEN, Triage RN  [facility]

## 2023-01-25 NOTE — TELEPHONE ENCOUNTER
Please set up a 8am video or phone call tomorrow to discuss his xray and plan of care    Thank you    Ezekiel Quevedo M.D.

## 2023-01-25 NOTE — TELEPHONE ENCOUNTER
Patient Contact    Attempt # 1    Was call answered?  No. VM is full unable to leave a message. No alternative number listed for pt/his mother     Will leave in basket to try recalling     Soha OBRIEN, Triage RN  [facility]

## 2023-01-25 NOTE — TELEPHONE ENCOUNTER
Arlin Raya (Lead Care Coordinator with [facility]) asking that you call Dr Gonzaels on his mobile phone regarding Ambrocio  -  Provider direct number: [phone]     Soha OBRIEN, Triage RN  [facility]

## 2023-01-26 ENCOUNTER — TELEPHONE (OUTPATIENT)
Dept: FAMILY MEDICINE | Facility: CLINIC | Age: 63
End: 2023-01-26

## 2023-01-26 ENCOUNTER — VIRTUAL VISIT (OUTPATIENT)
Dept: FAMILY MEDICINE | Facility: CLINIC | Age: 63
End: 2023-01-26
Payer: MEDICARE

## 2023-01-26 ENCOUNTER — MEDICAL CORRESPONDENCE (OUTPATIENT)
Dept: FAMILY MEDICINE | Facility: CLINIC | Age: 63
End: 2023-01-26

## 2023-01-26 DIAGNOSIS — G80.1 SPASTIC DIPLEGIC CEREBRAL PALSY (H): Primary | ICD-10-CM

## 2023-01-26 DIAGNOSIS — M79.661 PAIN OF RIGHT LOWER LEG: ICD-10-CM

## 2023-01-26 DIAGNOSIS — M25.552 HIP PAIN, LEFT: Primary | ICD-10-CM

## 2023-01-26 PROCEDURE — 99443 PR PHYSICIAN TELEPHONE EVALUATION 21-30 MIN: CPT | Mod: 95 | Performed by: INTERNAL MEDICINE

## 2023-01-26 NOTE — TELEPHONE ENCOUNTER
"Patient and patient's wife (patient gave verbal C2C) calling to follow-up on VV with PCP from earlier today.    Patient's wife stated that an in-home nurse was scheduled to come today, but that she called to cancel the visit, saying that PCP canceled in-home care. Patient and patient's wife state that they do not know why PCP would have canceled home care, as the importance of getting the patient up and out of bed was stressed during the appointment today.    Patient's wife also states that PCP recommended that José Miguel get an ortho eval with [facility], but that he won't be able to do that if they do not have help getting the patient out of bed.    Writer reviewed with patient and patient's wife PCP's recommendation from today's VV that patient be admitted for further evaluation, and that nursing or therapy coming to the home is not adequate. Patient's wife stated that she contacted the health department today and was told that the patient \"should have trial runs at getting out of bed\" before being seen at [facility] for evaluation.    Patient's wife requesting that Home health care be reinstated so that they can \"get him reconditioned\" in order to leave the house and be evaluated. Routing to PCP for advice or recommendations on further action.    Callback: [phone] ok to leave a detailed vm    Lilian Parikh RN  -[facility]        "

## 2023-01-26 NOTE — TELEPHONE ENCOUNTER
Writer called and left  for Radha - Care Coordinator from [facility]. Per Radha's voicemail, she is out of office until February 7th.    Triage to recall [facility] main line during office hours to follow up on below.    Haritha Perez RN  [facility]
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
## 2023-01-26 NOTE — TELEPHONE ENCOUNTER
I do not recall cancelling home care, no intention to do that, please contact home care and let them know I want them to continue seeing the patient.    In terms of seeing ortho, I do not think he would need more than a couple of times getting into a chair in order for him to go there.    Also, this is his mother, he has no wife.    Thanks    Ezekiel Quevedo M.D.

## 2023-01-27 NOTE — TELEPHONE ENCOUNTER
So does this mean things have been taking care of?  If not please let me know.  Either way please let the patient know.    Thank you.    Ezekiel Quevedo M.D.

## 2023-01-27 NOTE — TELEPHONE ENCOUNTER
"Called Radha, who was not out of office this week. She states that it was decided while she was on vacation last week. She states OT decided they did not want to continue because mom wanted new equipment to be ordered but OT felt it was not necessary because he wasn't getting out of bed to use it.  According to Radha, mom then decided that they would pursue [facility] for therapies. At that time, Home Care was closed for services because \"he can't have both. \"    Home Care supervisor will also be returning call for more clarification as Radha was out of town when this happened.     Mary Mirza RN on 1/27/2023 at 8:58 AM    "

## 2023-01-27 NOTE — TELEPHONE ENCOUNTER
Called LORI Payne for her to call back to verify if things are taken care of or if anything further needed from provider regarding this     Soha OBRIEN, Triage RN  [facility]

## 2023-01-27 NOTE — TELEPHONE ENCOUNTER
Received call from Kerry BARTON from [facility] calling back to provide clarifications as she was filling for Radha.     1. Original referral sent to [facility] in error, mother and Client wanted [facility]    2. Client was open to [facility]. Mother and Client were educated that in order to determine need would need to have an Xray of L hip. [facility] tried to assist getting pt Xray at [facility]--all were unable to get pt appointment due to medical complexities. Mother and client educated that would need to take pt to ER. Mother did not want pt taken to ER for xray    3. Educated mother and client unable to provide home care through [facility] and be seen at [facility] outpatient due to pairing.     Client and mother officially dishcarged on 1/26/ d/t unable to provided skilled nursing at home and mother choosing [facility].     All information was faxed to [facility] already. Faxed initial referral to [facility].     ORALIA Payne to send discharge report to provider.     If any questions from provider, please call:   [phone] ([facility] home care RN) confidential number. On vacation next week.    Dominga Zavala, RN

## 2023-01-30 NOTE — TELEPHONE ENCOUNTER
"Patient Contact    Attempt # 1    Was call answered?  Yes. Spoke to patient and patient's mother concerning ongoing needs.     Patient's mother states that they still are in need of:  \"a one line sentence that states that Dr. Quevedo approves for an in-home therapist to make an assessment for in-home equipment\".     Writer attempted to determine what the patient was requesting (home care, OT/PT home care eval, etc) but could not determine what type of referral/letter was required. Writer contacted [facility] (see below).    Patient's mother also asked what PCP's office was going to do about discontinuation of home health services. Writer notified patient and patient's mother that services were discontinued by [facility] and that patient should reach out to [facility] or an alternative home care agency to establish care. Writer notified patient that PCP and PCP's office do not coordinate home care services/visits.    [facility] Care Contact    Attempt # 1    Was call answered?  Yes. Writer attempted to contacted [facility], specifically Jaleel who the patient requested writer speak to and patient provided phone number for.     Jaleel stated, \"I don't know why people are asking me about this chart, I don't know anything about this patient\". Writer asked if there was someone familiar with the patient, Jaleel stated that \"no one knows what is going on with this patient\", but agreed to take a look at the chart. After reviewing the chart, Jaleel stated that writer should be speaking with Jaleel at the [facility].    [facility] Contact    Attempt # 1    Was call answered?  Yes. Writer contacted [facility] upon direction from [facility] and was told that no Jaleel worked at this office. Writer was given an alternative number for [place] office.    [facility] Contact    Attempt # 1    Was call answered? No. Writer left a " voicemail with Jaleel ECKERT at [facility] ([phone]) with information to call clinic back.    On callback, please determine what is requested from PCP/clinic in regards to referral, letter, etc.      Lilian Parikh RN  -[facility]

## 2023-02-02 ENCOUNTER — PATIENT OUTREACH (OUTPATIENT)
Dept: CARE COORDINATION | Facility: CLINIC | Age: 63
End: 2023-02-02
Payer: MEDICARE

## 2023-02-02 ASSESSMENT — ACTIVITIES OF DAILY LIVING (ADL): DEPENDENT_IADLS:: TRANSPORTATION

## 2023-02-02 NOTE — TELEPHONE ENCOUNTER
Patient Contact    Attempt # 2    Was call answered? No.    Left message for patient to call triage back.    Mary Almanza RN      ON CALL BACK, PLEASE GET DETAILED INFO REGARDING WHAT IS NEEDED FOR HOME VISIT AND WHERE IT CAN BE FAXED TO.     Mary Mirza RN on 2/2/2023 at 11:46 AM

## 2023-02-02 NOTE — TELEPHONE ENCOUNTER
Just received a call back from [place].  This looks from below beyond triage capacity, and does not look like Care coord has been brought in here.  Referring to our Care Coord to help with this

## 2023-02-02 NOTE — PROGRESS NOTES
Clinic Care Coordination Contact  Care Team Conversations    RNCC received an in-basket message asking for care coordination to assess if patient is requesting home care services with equipment of continuing with [facility]/OT services and working with TOMEKA Castillo [phone].  RNCC reached out to Jaleel and left vm for a return call.  After RNCC speaking to TOMEKA Marmolejo, will then reach out to family after understanding what is needed-home care or outpatient therapies.     Yudith Oliver RN, BSN, PHN  Primary Care / Care Coordinator   [facility]  [email or web address] [email or web address]   [phone]

## 2023-02-03 ENCOUNTER — PATIENT OUTREACH (OUTPATIENT)
Dept: NURSING | Facility: CLINIC | Age: 63
End: 2023-02-03
Payer: MEDICARE

## 2023-02-03 ASSESSMENT — ACTIVITIES OF DAILY LIVING (ADL): DEPENDENT_IADLS:: TRANSPORTATION

## 2023-02-03 NOTE — PROGRESS NOTES
"Clinic Care Coordination Contact  Care Team Conversations    RNCC reached out to TOMEKA Castillo, [phone], inpatient at [facility], per Jaleel she has no idea who patient is and she works inpatient at the [employer] which is like a TCU.  Jaleel yomaira this is the second time someone has reached out about this patient.     RNCC reached out to patient/Ellen, mother, verbalized consent to communicate with mother given by patient.  Mother states the [facility] provides a therapist to come into a patients home to assess for home equipment; wheelchair, hospital bed, shower bench...etc. and in order for Medicare to cover these costs the PCP needs to place a referral stating \"PCP approves an in home therapist to assess for home equipment\" as stated by mother.  Mother is very upset and states   \"Why can't you people understand this!\"  Mother gave RNCC the phone number to [facility], [phone], and fax [phone].  RNCC relayed to the patient/mother of needed research on the facility to ensure proper referral process adding, it may take a day or two and RNCC will get back to them once informed of process.    RNCC called the number and it answered, \"[facility]\" and then call was rolled into intake and after 35 minutes of being on hold, RNCC hung up.    RNCC reached back out TOMEKA Castillo to obtain outpatient resource/name/number to see if this a service they provide and to inquire on process of referring.   Jaleel transferred RNCC to [facility] outpatient [facility], without giving a contact number and RNCC left contact information for a return call.  RNCC will wait for a return call.    Update 2/6/2023:   RNCC received a vm on 2/3/2023 at 2:20 pm  from [facility] outpatient [facility], stating since the merge of [facility] and [facility], they no longer " provide an in home assessment for home equipment.       RNCC reached out to patient/mother and left vm with the above information and suggesting home care Physical Therapy; RNCC left contact information for a return call.  RNCC will wait for a return call.    1:33 pm:  RNCC reached out to patient x 2 and left vm with contact information to call back.    Yudith Oliver RN, BSN, PHN  Primary Care / Care Coordinator   [facility]  [email or web address]   [phone]

## 2023-02-07 ENCOUNTER — PATIENT OUTREACH (OUTPATIENT)
Dept: NURSING | Facility: CLINIC | Age: 63
End: 2023-02-07
Payer: MEDICARE

## 2023-02-07 ASSESSMENT — ACTIVITIES OF DAILY LIVING (ADL): DEPENDENT_IADLS:: TRANSPORTATION

## 2023-02-07 NOTE — LETTER
[facility] CARE COORDINATION  [address]  [place] [address]    February 7, 2023    Ambrocio Delvalle  [address]  [place] [address]      Dear Ambrocio,    I am a clinic care coordinator who works with Ezekiel Quevedo MD with the [facility]. I wanted to thank you for spending the time to talk with me.  Below is a description of clinic care coordination and how I can further assist you.       The clinic care coordination team is made up of a registered nurse, , financial resource worker and community health worker who understand the health care system. The goal of clinic care coordination is to help you manage your health and improve access to the health care system. Our team works alongside your provider to assist you in determining your health and social needs. We can help you obtain health care and community resources, providing you with necessary information and education. We can work with you through any barriers and develop a care plan that helps coordinate and strengthen the communication between you and your care team.    Please feel free to contact me with any questions or concerns regarding care coordination and what we can offer.      We are focused on providing you with the highest-quality healthcare experience possible.    Sincerely,      Yudith Oliver RN, BSN, PHN  Primary Care / Care Coordinator   [facility]  [email or web address]   [phone]

## 2023-02-07 NOTE — PROGRESS NOTES
"Clinic Care Coordination Contact  Care Team Conversations    RNCC reached out to patient/mother to apprise that due to the [facility], [facility] no longer does in home equipment assessments and offered patient/mother home care Physical Therapy home safety evaluation.  Mother is unsure about home care at this time and would like time to \"research\" the [facility] merge.  Per mother, PCP will not let patient utilize another home care agency; meaning only able to use [facility] and RNCC relayed they have a choice of home care agency they can use.    Ivette, mother, verbalized her frustration with PCP and made offensive remarks toward PCP; RNCC redirected mother yet she continued.  RNCC gave patient/mother RNCC contact information and also sent information via [employer] and requested to call back once they decide how to move forward.  Ivette/patient verbalized their understanding, stating \"This could take some time to research\".  No further questions/concerns by patient/mother.  No further care coordination outreaches at this time.     Yudith Oliver RN, BSN, PHN  Primary Care / Care Coordinator   [facility]  E-mail [email or web address]   [phone]      "

## 2023-02-14 ENCOUNTER — TELEPHONE (OUTPATIENT)
Dept: FAMILY MEDICINE | Facility: CLINIC | Age: 63
End: 2023-02-14

## 2023-02-14 ENCOUNTER — PATIENT OUTREACH (OUTPATIENT)
Dept: NURSING | Facility: CLINIC | Age: 63
End: 2023-02-14
Payer: MEDICARE

## 2023-02-14 NOTE — PROGRESS NOTES
"Clinic Care Coordination Contact  Care Team Conversations    RNCC received an in-basket message from PCP team that patient/mother would like a call from RNCC.  RNCC reached out to mother/patient (Consent to Communicate in EMR) who immediately discussed their \"disgust\" and frustration with [facility] and lack of \"humanity\".  Mother made off-putting comments; mother stated they no longer call [facility], they now call it \"[facility]\".   Mother explained the average time to wait in the ER is \"5 hours!\" and patient will not go to [facility].    RNCC inquired if mother/patient would like home care for Physical Therapy/OT and mother, again, shared her \"disgust\" with [facility] and that they are no longer able to use this home care agency.  RNCC relayed they can choose which ever home care agency they would like.  Per Mother, she wants [facility] with \"Radha, the nurse only to work with José Miguel to get him out of the bed and in a randa\".  Mother stated they had a good experience with Radha, on the first visit yet on the second visit with \"[place] [facility]-forget it!\"    RNCC relayed that home care nurses don't do therapies; Physical Therapy and OT assess and recommend treatment.  This made the mother upset and stated that \"Radha said she would do therapy\" and if a patient is in the hospital, \"A nurse will help a patient get out of bed\".  RNCC relayed that home care nursing will assess vital signs, address medications, assess wounds and provide wound care.    Mother became angry with RNCC when writer asked if patient would like home care RN/PT/OT services and mother, again, said, she wants [facility] and only wants Radha to do RN and Physical Therapy/OT therapies.  Mother made derogatory remarks about PCP and reiterated how terrible the care is with [facility]. RNCC asked if writer should reach out to [facility] to see if they would be able to assess " "patient at home.  Mother yelled at RNCC stating, \"You're not listening to me!  I just told you they won't take José Miguel!    Mother stated she has reached out to her  \"friend who now lives in [place]\" and the \"Department of Health\" about [facility]'s lack of care and threatened to ana this writer. RNCC suggested they call [facility] Patient Relations at [phone] yet mother would not take the phone number stating, \"That would just get [facility] off the hook!\"  Mother threatened RNCC again stating she was going to get an independent  to ana writer.    RNCC asked again, if mother/patient would like a referral for home RN/PT/OT care services or the phone number to Patient Relations and mother declined adding how \"terrible\" [facility] treats patients.    RNCC suggested patient to go to any ER to have patient evaluated for bed bound wounds and inability to get out of bed and mother/patient refused.  RNCC reiterated they can go to any ER; [facility].  Mother refused adding derogatory remarks and RNCC stated writer was going to hang up the phone and mother stated, \"You'll be hearing from a \" and RNCC ended the phone call.    No further care coordination outreaches at this time.  RNCC will reach out to writer's Manger about this situation.     Yudith Oliver RN, BSN, PHN  Primary Care / Care Coordinator   [facility]  E-mail [email or web address]   [phone]      "

## 2023-02-14 NOTE — TELEPHONE ENCOUNTER
CC: Patient's mother Ivette (C2C) calling on behalf of the patient.    Ivette states they having been trying to contact Yudith - care coordinator. Ivette would not provide further information.    Routing to Yudith to please advise if able to reach out to patient and mother directly? They are requesting a call from you as soon as possible. Callback [phone].     Thank you!     Haritha Perez RN  [facility]

## 2023-02-15 ENCOUNTER — TELEPHONE (OUTPATIENT)
Dept: FAMILY MEDICINE | Facility: CLINIC | Age: 63
End: 2023-02-15

## 2023-02-15 NOTE — TELEPHONE ENCOUNTER
TO PROVIDER:     Hayley with [facility] Calling     [phone] - detailed message okay     PCP/Pt's mom trying to get patient enrolled with home care. Previously there was some confusion over where to send the orders so pt has not yet gotten the start of home care     REQUESTING VERBAL ORDER TO START HOME CARE THROUGH [facility]     Please advise - ok to give verbal on this? Requesting ASAP     Soha OBRIEN Triage RN  [facility]

## 2023-02-15 NOTE — TELEPHONE ENCOUNTER
Left a detailed message for Hayley OBRIEN, Triage RN  [facility]

## 2023-02-17 ENCOUNTER — MEDICAL CORRESPONDENCE (OUTPATIENT)
Dept: HEALTH INFORMATION MANAGEMENT | Facility: CLINIC | Age: 63
End: 2023-02-17

## 2023-02-17 ENCOUNTER — TELEPHONE (OUTPATIENT)
Dept: FAMILY MEDICINE | Facility: CLINIC | Age: 63
End: 2023-02-17
Payer: MEDICARE

## 2023-02-17 ENCOUNTER — PATIENT OUTREACH (OUTPATIENT)
Dept: CARE COORDINATION | Facility: CLINIC | Age: 63
End: 2023-02-17
Payer: MEDICARE

## 2023-02-17 DIAGNOSIS — G80.1 SPASTIC DIPLEGIC CEREBRAL PALSY (H): Primary | ICD-10-CM
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
## 2023-02-20 ENCOUNTER — PATIENT OUTREACH (OUTPATIENT)
Dept: OTHER | Facility: CLINIC | Age: 63
End: 2023-02-20
Payer: MEDICARE

## 2023-02-20 ENCOUNTER — TELEPHONE (OUTPATIENT)
Dept: FAMILY MEDICINE | Facility: CLINIC | Age: 63
End: 2023-02-20
Payer: MEDICARE

## 2023-02-20 DIAGNOSIS — T14.8XXA WOUND INFECTION: Primary | ICD-10-CM

## 2023-02-20 DIAGNOSIS — L08.9 WOUND INFECTION: Primary | ICD-10-CM

## 2023-02-20 NOTE — TELEPHONE ENCOUNTER
Yudith Oliver RN Gotlieb, Paul Steven, MD; [facility]; [facility] [place] Triage  Hi Dr. Quevedo,     Would you be willing to refer this patient to the Community Paramedic to assess patient for possible wounds due to be bound status?  I think this would be great step in getting medical eyes on the patient since the patient is not willing to come into the clinic or go to any ER facility?     Through Kika Felix, she has apprised CP about this patient's situation and are able to see him Monday or Tuesday of next week if we could obtain a referral for CP.       Yudith Oliver RN, BSN, PHN   Primary Care / Care Coordinator   [facility]   E-mail [email or web address]   [phone]

## 2023-02-20 NOTE — PROGRESS NOTES
12:30 pm: Left a message x 1 for a call back to attempt scheduling a home visit tomorrow.    5:00 pm:  Left message with call back number.  Will try again tomorrow morning.    Tereza Jimenez NRP, CP  [facility]  Phone number [phone]  [email or web address]

## 2023-02-20 NOTE — TELEPHONE ENCOUNTER
PCP  See below.   Please advise.       Order pended for your Review.     Mary Mirza RN on 2/20/2023 at 8:58 AM

## 2023-02-23 ENCOUNTER — PATIENT OUTREACH (OUTPATIENT)
Dept: OTHER | Facility: CLINIC | Age: 63
End: 2023-02-23
Payer: MEDICARE

## 2023-02-23 NOTE — PROGRESS NOTES
I spoke to Zainab Steele mother, and offered Community Paramedic services, to which she declined.  She said she remembers me and will reach out if she changes her mind.     No additional outreach planned and Community Paramedic referral closed.    Please place a new referral if future needs arise.    Tereza Jimenez NRP, CP  Community Paramedic  Phone number [phone]  [email or web address]

## 2023-02-24 ENCOUNTER — TELEPHONE (OUTPATIENT)
Dept: FAMILY MEDICINE | Facility: CLINIC | Age: 63
End: 2023-02-24
Payer: MEDICARE

## 2023-02-24 DIAGNOSIS — G80.1 SPASTIC DIPLEGIC CEREBRAL PALSY (H): Primary | ICD-10-CM

## 2023-02-24 NOTE — TELEPHONE ENCOUNTER
Per chart review, care coordinator attempted patient outreach on 2/17/23.    Signing this encounter.    Haritha Perez RN  [facility]

## 2023-02-24 NOTE — TELEPHONE ENCOUNTER
OT Shanon with [facility] called requesting the following orders     1) Verbal approval for OT visits 1 x a wk for 4 wks.     Verbal approval given.     2)DME order for an electric hospital bed with bilateral half bed rails, over head trapeze bar and air pressure mattress overlay.    DME order needs to be faxed to [facility] at ([phone]  (P)[phone]    [facility] will be faxing supporting information for hospital bed that needs to be included on providers notes and faxed to [facility].    Faxed will be sent today or Monday.   [facility] can be contacted at [phone]. Ok to leave a detailed voice message.

## 2023-02-28 NOTE — TELEPHONE ENCOUNTER
Faxed over DME orders and last 2 visit notes to [facility]     Soha OBRIEN, Triage RN  [facility]

## 2023-03-02 ENCOUNTER — MEDICAL CORRESPONDENCE (OUTPATIENT)
Dept: HEALTH INFORMATION MANAGEMENT | Facility: CLINIC | Age: 63
End: 2023-03-02

## 2023-03-07 ENCOUNTER — MEDICAL CORRESPONDENCE (OUTPATIENT)
Dept: HEALTH INFORMATION MANAGEMENT | Facility: CLINIC | Age: 63
End: 2023-03-07

## 2023-03-13 ENCOUNTER — MEDICAL CORRESPONDENCE (OUTPATIENT)
Dept: HEALTH INFORMATION MANAGEMENT | Facility: CLINIC | Age: 63
End: 2023-03-13

## 2023-03-15 ENCOUNTER — MEDICAL CORRESPONDENCE (OUTPATIENT)
Dept: HEALTH INFORMATION MANAGEMENT | Facility: CLINIC | Age: 63
End: 2023-03-15

## 2023-03-20 ENCOUNTER — APPOINTMENT (OUTPATIENT)
Dept: ULTRASOUND IMAGING | Facility: CLINIC | Age: 63
End: 2023-03-20
Attending: EMERGENCY MEDICINE
Payer: MEDICARE

## 2023-03-20 ENCOUNTER — APPOINTMENT (OUTPATIENT)
Dept: CT IMAGING | Facility: CLINIC | Age: 63
End: 2023-03-20
Attending: EMERGENCY MEDICINE
Payer: MEDICARE

## 2023-03-20 ENCOUNTER — APPOINTMENT (OUTPATIENT)
Dept: GENERAL RADIOLOGY | Facility: CLINIC | Age: 63
End: 2023-03-20
Attending: EMERGENCY MEDICINE
Payer: MEDICARE

## 2023-03-20 ENCOUNTER — HOSPITAL ENCOUNTER (EMERGENCY)
Facility: CLINIC | Age: 63
Discharge: HOME | End: 2023-03-20
Attending: EMERGENCY MEDICINE | Admitting: EMERGENCY MEDICINE
Payer: MEDICARE

## 2023-03-20 ENCOUNTER — MEDICAL CORRESPONDENCE (OUTPATIENT)
Dept: HEALTH INFORMATION MANAGEMENT | Facility: CLINIC | Age: 63
End: 2023-03-20

## 2023-03-20 VITALS
SYSTOLIC BLOOD PRESSURE: 147 MMHG | WEIGHT: 160 LBS | OXYGEN SATURATION: 97 % | RESPIRATION RATE: 18 BRPM | DIASTOLIC BLOOD PRESSURE: 94 MMHG | HEART RATE: 84 BPM | TEMPERATURE: 98.9 F | BODY MASS INDEX: 29.26 KG/M2

## 2023-03-20 DIAGNOSIS — S82.245A CLOSED NONDISPLACED SPIRAL FRACTURE OF SHAFT OF LEFT TIBIA, INITIAL ENCOUNTER: ICD-10-CM

## 2023-03-20 LAB
ANION GAP SERPL CALCULATED.3IONS-SCNC: 12 MMOL/L (ref 7–15)
BASOPHILS # BLD AUTO: 0.1 10E3/UL (ref 0–0.2)
BASOPHILS NFR BLD AUTO: 1 %
BUN SERPL-MCNC: 19.2 MG/DL (ref 8–23)
CALCIUM SERPL-MCNC: 9.2 MG/DL (ref 8.8–10.2)
CHLORIDE SERPL-SCNC: 102 MMOL/L (ref 98–107)
CREAT SERPL-MCNC: 0.77 MG/DL (ref 0.67–1.17)
DEPRECATED HCO3 PLAS-SCNC: 27 MMOL/L (ref 22–29)
EOSINOPHIL # BLD AUTO: 0.1 10E3/UL (ref 0–0.7)
EOSINOPHIL NFR BLD AUTO: 1 %
ERYTHROCYTE [DISTWIDTH] IN BLOOD BY AUTOMATED COUNT: 13.2 % (ref 10–15)
GFR SERPL CREATININE-BSD FRML MDRD: >90 ML/MIN/1.73M2
GLUCOSE SERPL-MCNC: 99 MG/DL (ref 70–99)
HCT VFR BLD AUTO: 52.2 % (ref 40–53)
HGB BLD-MCNC: 17 G/DL (ref 13.3–17.7)
IMM GRANULOCYTES # BLD: 0.1 10E3/UL
IMM GRANULOCYTES NFR BLD: 1 %
LYMPHOCYTES # BLD AUTO: 1.4 10E3/UL (ref 0.8–5.3)
LYMPHOCYTES NFR BLD AUTO: 12 %
MCH RBC QN AUTO: 29.1 PG (ref 26.5–33)
MCHC RBC AUTO-ENTMCNC: 32.6 G/DL (ref 31.5–36.5)
MCV RBC AUTO: 89 FL (ref 78–100)
MONOCYTES # BLD AUTO: 0.9 10E3/UL (ref 0–1.3)
MONOCYTES NFR BLD AUTO: 7 %
NEUTROPHILS # BLD AUTO: 9.1 10E3/UL (ref 1.6–8.3)
NEUTROPHILS NFR BLD AUTO: 78 %
NRBC # BLD AUTO: 0 10E3/UL
NRBC BLD AUTO-RTO: 0 /100
PLATELET # BLD AUTO: 320 10E3/UL (ref 150–450)
POTASSIUM SERPL-SCNC: 3.8 MMOL/L (ref 3.4–5.3)
RBC # BLD AUTO: 5.84 10E6/UL (ref 4.4–5.9)
SODIUM SERPL-SCNC: 141 MMOL/L (ref 136–145)
WBC # BLD AUTO: 11.7 10E3/UL (ref 4–11)

## 2023-03-20 PROCEDURE — 73630 X-RAY EXAM OF FOOT: CPT | Mod: RT

## 2023-03-20 PROCEDURE — 99285 EMERGENCY DEPT VISIT HI MDM: CPT | Mod: 25

## 2023-03-20 PROCEDURE — 85025 COMPLETE CBC W/AUTO DIFF WBC: CPT | Performed by: EMERGENCY MEDICINE

## 2023-03-20 PROCEDURE — G1010 CDSM STANSON: HCPCS

## 2023-03-20 PROCEDURE — 36415 COLL VENOUS BLD VENIPUNCTURE: CPT | Performed by: EMERGENCY MEDICINE

## 2023-03-20 PROCEDURE — 71045 X-RAY EXAM CHEST 1 VIEW: CPT

## 2023-03-20 PROCEDURE — 93970 EXTREMITY STUDY: CPT

## 2023-03-20 PROCEDURE — 73502 X-RAY EXAM HIP UNI 2-3 VIEWS: CPT

## 2023-03-20 PROCEDURE — 250N000013 HC RX MED GY IP 250 OP 250 PS 637: Performed by: EMERGENCY MEDICINE

## 2023-03-20 PROCEDURE — 27750 TREATMENT OF TIBIA FRACTURE: CPT | Mod: LT

## 2023-03-20 PROCEDURE — 80048 BASIC METABOLIC PNL TOTAL CA: CPT | Performed by: EMERGENCY MEDICINE

## 2023-03-20 PROCEDURE — 999N000065 XR TIBIA AND FIBULA RIGHT 2 VIEWS

## 2023-03-20 PROCEDURE — 73590 X-RAY EXAM OF LOWER LEG: CPT | Mod: RT

## 2023-03-20 RX ORDER — HYDROCODONE BITARTRATE AND ACETAMINOPHEN 5; 325 MG/1; MG/1
1 TABLET ORAL ONCE
Status: COMPLETED | OUTPATIENT
Start: 2023-03-20 | End: 2023-03-20

## 2023-03-20 RX ADMIN — HYDROCODONE BITARTRATE AND ACETAMINOPHEN 1 TABLET: 5; 325 TABLET ORAL at 15:07

## 2023-03-20 ASSESSMENT — ACTIVITIES OF DAILY LIVING (ADL)
ADLS_ACUITY_SCORE: 35

## 2023-03-20 ASSESSMENT — ENCOUNTER SYMPTOMS
MYALGIAS: 1
ARTHRALGIAS: 1
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
## 2023-03-21 ENCOUNTER — MEDICAL CORRESPONDENCE (OUTPATIENT)
Dept: HEALTH INFORMATION MANAGEMENT | Facility: CLINIC | Age: 63
End: 2023-03-21

## 2023-03-21 NOTE — DISCHARGE INSTRUCTIONS
Return to the ER for worsening pain, numbness or discoloration of the toes, or for any new concerns.

## 2023-03-23 ENCOUNTER — MEDICAL CORRESPONDENCE (OUTPATIENT)
Dept: HEALTH INFORMATION MANAGEMENT | Facility: CLINIC | Age: 63
End: 2023-03-23

## 2023-03-27 ENCOUNTER — MEDICAL CORRESPONDENCE (OUTPATIENT)
Dept: HEALTH INFORMATION MANAGEMENT | Facility: CLINIC | Age: 63
End: 2023-03-27

## 2023-04-01 ENCOUNTER — HEALTH MAINTENANCE LETTER (OUTPATIENT)
Age: 63
End: 2023-04-01

## 2023-04-03 ENCOUNTER — TELEPHONE (OUTPATIENT)
Dept: FAMILY MEDICINE | Facility: CLINIC | Age: 63
End: 2023-04-03
Payer: MEDICARE

## 2023-04-03 NOTE — TELEPHONE ENCOUNTER
[facility] Supports- form requesting ICD codes. Complete form and copy of problem /med list faxed to [phone].  Copy sent to scanning and copy placed in accordion folder blue pod.  Breana Adamson, CMA

## 2023-04-04 ENCOUNTER — MEDICAL CORRESPONDENCE (OUTPATIENT)
Dept: HEALTH INFORMATION MANAGEMENT | Facility: CLINIC | Age: 63
End: 2023-04-04

## 2023-04-06 ENCOUNTER — MEDICAL CORRESPONDENCE (OUTPATIENT)
Dept: HEALTH INFORMATION MANAGEMENT | Facility: CLINIC | Age: 63
End: 2023-04-06

## 2023-04-08 DIAGNOSIS — R25.2 SPASM: ICD-10-CM

## 2023-04-08 RX ORDER — DIAZEPAM 5 MG
5 TABLET ORAL
Qty: 30 TABLET | Refills: 0 | Status: ON HOLD | OUTPATIENT
Start: 2023-04-08 | End: 2023-08-04

## 2023-07-29 ENCOUNTER — HOSPITAL ENCOUNTER (OUTPATIENT)
Facility: CLINIC | Age: 63
Setting detail: OBSERVATION
Discharge: HOME | End: 2023-08-08
Attending: EMERGENCY MEDICINE | Admitting: HOSPITALIST
Payer: MEDICARE

## 2023-07-29 DIAGNOSIS — R25.2 SPASM: ICD-10-CM

## 2023-07-29 DIAGNOSIS — E78.5 HYPERLIPIDEMIA LDL GOAL <130: ICD-10-CM

## 2023-07-29 DIAGNOSIS — I10 BENIGN ESSENTIAL HYPERTENSION: ICD-10-CM

## 2023-07-29 DIAGNOSIS — G80.9 CEREBRAL PALSY, UNSPECIFIED TYPE (H): ICD-10-CM

## 2023-07-29 LAB
ANION GAP SERPL CALCULATED.3IONS-SCNC: 15 MMOL/L (ref 7–15)
BASOPHILS # BLD AUTO: 0.1 10E3/UL (ref 0–0.2)
BASOPHILS NFR BLD AUTO: 1 %
BUN SERPL-MCNC: 9.9 MG/DL (ref 8–23)
CALCIUM SERPL-MCNC: 9.5 MG/DL (ref 8.8–10.2)
CHLORIDE SERPL-SCNC: 101 MMOL/L (ref 98–107)
CREAT SERPL-MCNC: 0.76 MG/DL (ref 0.67–1.17)
DEPRECATED HCO3 PLAS-SCNC: 25 MMOL/L (ref 22–29)
EOSINOPHIL # BLD AUTO: 0 10E3/UL (ref 0–0.7)
EOSINOPHIL NFR BLD AUTO: 0 %
ERYTHROCYTE [DISTWIDTH] IN BLOOD BY AUTOMATED COUNT: 12.8 % (ref 10–15)
GFR SERPL CREATININE-BSD FRML MDRD: >90 ML/MIN/1.73M2
GLUCOSE SERPL-MCNC: 95 MG/DL (ref 70–99)
HCT VFR BLD AUTO: 52.1 % (ref 40–53)
HGB BLD-MCNC: 17 G/DL (ref 13.3–17.7)
IMM GRANULOCYTES # BLD: 0.1 10E3/UL
IMM GRANULOCYTES NFR BLD: 1 %
LYMPHOCYTES # BLD AUTO: 0.9 10E3/UL (ref 0.8–5.3)
LYMPHOCYTES NFR BLD AUTO: 9 %
MCH RBC QN AUTO: 29.1 PG (ref 26.5–33)
MCHC RBC AUTO-ENTMCNC: 32.6 G/DL (ref 31.5–36.5)
MCV RBC AUTO: 89 FL (ref 78–100)
MONOCYTES # BLD AUTO: 0.7 10E3/UL (ref 0–1.3)
MONOCYTES NFR BLD AUTO: 7 %
NEUTROPHILS # BLD AUTO: 8.5 10E3/UL (ref 1.6–8.3)
NEUTROPHILS NFR BLD AUTO: 82 %
NRBC # BLD AUTO: 0 10E3/UL
NRBC BLD AUTO-RTO: 0 /100
PLATELET # BLD AUTO: 355 10E3/UL (ref 150–450)
POTASSIUM SERPL-SCNC: 3.8 MMOL/L (ref 3.4–5.3)
RBC # BLD AUTO: 5.84 10E6/UL (ref 4.4–5.9)
SODIUM SERPL-SCNC: 141 MMOL/L (ref 136–145)
WBC # BLD AUTO: 10.3 10E3/UL (ref 4–11)

## 2023-07-29 PROCEDURE — 99285 EMERGENCY DEPT VISIT HI MDM: CPT | Mod: 25

## 2023-07-29 PROCEDURE — 99221 1ST HOSP IP/OBS SF/LOW 40: CPT | Mod: AI | Performed by: HOSPITALIST

## 2023-07-29 PROCEDURE — G0378 HOSPITAL OBSERVATION PER HR: HCPCS

## 2023-07-29 PROCEDURE — 250N000013 HC RX MED GY IP 250 OP 250 PS 637: Performed by: HOSPITALIST

## 2023-07-29 PROCEDURE — 82310 ASSAY OF CALCIUM: CPT | Performed by: EMERGENCY MEDICINE

## 2023-07-29 PROCEDURE — 85025 COMPLETE CBC W/AUTO DIFF WBC: CPT | Performed by: EMERGENCY MEDICINE

## 2023-07-29 PROCEDURE — 36415 COLL VENOUS BLD VENIPUNCTURE: CPT | Performed by: EMERGENCY MEDICINE

## 2023-07-29 RX ORDER — SERTRALINE HYDROCHLORIDE 100 MG/1
100 TABLET, FILM COATED ORAL DAILY
Status: DISCONTINUED | OUTPATIENT
Start: 2023-07-30 | End: 2023-07-30

## 2023-07-29 RX ORDER — BISACODYL 10 MG
10 SUPPOSITORY, RECTAL RECTAL DAILY PRN
Status: DISCONTINUED | OUTPATIENT
Start: 2023-07-29 | End: 2023-08-08 | Stop reason: HOSPADM

## 2023-07-29 RX ORDER — AMLODIPINE BESYLATE 2.5 MG/1
2.5 TABLET ORAL DAILY
Status: DISCONTINUED | OUTPATIENT
Start: 2023-07-30 | End: 2023-08-05

## 2023-07-29 RX ORDER — AMOXICILLIN 250 MG
1 CAPSULE ORAL 2 TIMES DAILY PRN
Status: DISCONTINUED | OUTPATIENT
Start: 2023-07-29 | End: 2023-08-08 | Stop reason: HOSPADM

## 2023-07-29 RX ORDER — ONDANSETRON 2 MG/ML
4 INJECTION INTRAMUSCULAR; INTRAVENOUS EVERY 6 HOURS PRN
Status: DISCONTINUED | OUTPATIENT
Start: 2023-07-29 | End: 2023-08-08 | Stop reason: HOSPADM

## 2023-07-29 RX ORDER — DIAZEPAM 5 MG
5 TABLET ORAL
Status: DISCONTINUED | OUTPATIENT
Start: 2023-07-29 | End: 2023-08-08 | Stop reason: HOSPADM

## 2023-07-29 RX ORDER — ACETAMINOPHEN 325 MG/1
650 TABLET ORAL EVERY 6 HOURS PRN
Status: DISCONTINUED | OUTPATIENT
Start: 2023-07-29 | End: 2023-08-08 | Stop reason: HOSPADM

## 2023-07-29 RX ORDER — BACLOFEN 10 MG/1
20 TABLET ORAL
Status: DISCONTINUED | OUTPATIENT
Start: 2023-07-29 | End: 2023-08-08 | Stop reason: HOSPADM

## 2023-07-29 RX ORDER — LIDOCAINE 40 MG/G
CREAM TOPICAL
Status: DISCONTINUED | OUTPATIENT
Start: 2023-07-29 | End: 2023-08-08 | Stop reason: HOSPADM

## 2023-07-29 RX ORDER — TRIAMTERENE AND HYDROCHLOROTHIAZIDE 37.5; 25 MG/1; MG/1
1 CAPSULE ORAL DAILY
Status: DISCONTINUED | OUTPATIENT
Start: 2023-07-30 | End: 2023-07-29

## 2023-07-29 RX ORDER — ACETAMINOPHEN 325 MG/1
325 TABLET ORAL EVERY 6 HOURS PRN
COMMUNITY

## 2023-07-29 RX ORDER — ACETAMINOPHEN 650 MG/1
650 SUPPOSITORY RECTAL EVERY 6 HOURS PRN
Status: DISCONTINUED | OUTPATIENT
Start: 2023-07-29 | End: 2023-08-08 | Stop reason: HOSPADM

## 2023-07-29 RX ORDER — ONDANSETRON 4 MG/1
4 TABLET, ORALLY DISINTEGRATING ORAL EVERY 6 HOURS PRN
Status: DISCONTINUED | OUTPATIENT
Start: 2023-07-29 | End: 2023-08-08 | Stop reason: HOSPADM

## 2023-07-29 RX ORDER — POLYETHYLENE GLYCOL 3350 17 G/17G
17 POWDER, FOR SOLUTION ORAL DAILY PRN
Status: DISCONTINUED | OUTPATIENT
Start: 2023-07-29 | End: 2023-08-08 | Stop reason: HOSPADM

## 2023-07-29 RX ORDER — AMOXICILLIN 250 MG
2 CAPSULE ORAL 2 TIMES DAILY PRN
Status: DISCONTINUED | OUTPATIENT
Start: 2023-07-29 | End: 2023-08-08 | Stop reason: HOSPADM

## 2023-07-29 RX ORDER — TRIAMTERENE/HYDROCHLOROTHIAZID 37.5-25 MG
1 TABLET ORAL DAILY
Status: DISCONTINUED | OUTPATIENT
Start: 2023-07-30 | End: 2023-08-08 | Stop reason: HOSPADM

## 2023-07-29 RX ADMIN — BACLOFEN 20 MG: 10 TABLET ORAL at 22:27

## 2023-07-29 ASSESSMENT — ACTIVITIES OF DAILY LIVING (ADL)
ADLS_ACUITY_SCORE: 35

## 2023-07-29 NOTE — ED PROVIDER NOTES
History     Chief Complaint:  vulnerable adult     The history is provided by the patient.      Ambrocio Delvalle is a 63 year old male with a history of cerebral palsy and hypertension who presents to the emergency department for vulnerable adult. The patient states that today his mother had to present to the emergency department for what he adds might have been a stroke. He reports that once police and EMS sent his mother they figured he'd be best sent to the emergency department today. He notes he was born with cerebral palsy and uses a motorized wheelchair.    Independent Historian:   None - Patient Only    Review of External Notes:   I reviewed the office note from 5/23/2023    Medications:    Amlodipine  Baclofen  Cetirizine  Denosumab  Diazepam  Flonase  Gabapentin  Lipitor  Omeprazole  Sertraline  Triamterene-hydrochlorothiazide     Past Medical History:    Cerebral palsy  Chronic cough  Depression  High cholesterol  Hypertension  Lumbar scoliosis  Microhematuria  Nephrolithiasis   Osteoporosis  Pharyngitis  Pulmonary HTN   Right ventricular hypertrophy  Urethral stricture  Vitamin D deficiency    Past Surgical History:    Lithotripsy, ureteral widened  Left knee and heel abductor surgery     Physical Exam   Patient Vitals for the past 24 hrs:   BP Temp Temp src Pulse Resp SpO2   07/29/23 1646 (!) 143/99 (P) 98  F (36.7  C) (P) Oral 112 20 96 %      Physical Exam  Nursing note and vitals reviewed.    Constitutional:  Appears comfortable.    HENT:                Nose normal.  No discharge.      Oral mucosa is moist.  Eyes:    Conjunctivae are normal without injection.  Pupils are equal.  Disconjugate gaze.  Cardiovascular:  Normal rate, regular rhythm with normal S1 and S2.      Normal heart sounds and peripheral pulses 2+ and equal.    Pulmonary:  Effort normal and breath sounds clear to auscultation bilaterally.    No respiratory distress.    GI:    Soft. No distension and no mass. No tenderness.    Musculoskeletal:  Atrophic lower extremities.  Able to move upper extremities but generally weak.  Neurological:   Alert and oriented.  CP noted with extremity weakness.  Skin:    Skin is warm and dry. No rash noted.   Psychiatric:   Behavior is normal. Appropriate mood and affect.     Judgment and thought content normal.       Emergency Department Course     Emergency Department Course & Assessments:    Interventions:  Medications - No data to display     Assessments:  1727 I obtained history and examined the patient as noted above.     Independent Interpretation (X-rays, CTs, rhythm strip):  None    Consultations/Discussion of Management or Tests:  1738 I spoke with Dr. Piña, hospitalist, regarding the patient. He accepts admission.    Social Determinants of Health affecting care:   None, Transportation, and Social Connections/Isolation    Disposition:  The patient was admitted observation to Dr. Piña.    Impression & Plan      Medical Decision Making:  Patient brought to the ER because his mom takes care of him and she is now being hospitalized today.  He needs care and lives with her and cannot be on his own.  He is a vulnerable adult.  I ordered some basic labs and talk with Dr. Piña.  The patient will be admitted observation and if his mom gets discharged tomorrow he will be discharged with her.  If she has to stay in the hospital,  will be consulted and look for TCU for him.  They need to have a long-term plan for when his mom is unable to care for him any longer.  His CBC is normal.    Diagnosis:    ICD-10-CM    1. Cerebral palsy, unspecified type (H)  G80.9          Scribe Disclosure:  I, Graham Gen, am serving as a scribe at 5:27 PM on 7/29/2023 to document services personally performed by Valerie Andrade MD, based on my observations and the provider's statements to me.     7/29/2023   Valerie Andrade MD Powell, Tracy Alan, MD  07/29/23 6121

## 2023-07-29 NOTE — H&P
[facility]    History and Physical - Hospitalist Service       Date of Admission:  7/29/2023    Assessment & Plan      Ambrocio Delvalle is a 63 year old male admitted on 7/29/2023.     Vulnerable adult, history of cerebral palsy: Patient presenting to the emergency department after his mother had to present to the emergency department for concern of stroke, patient with no caregiver at home while his mother is being admitted for stroke evaluation.  -Supportive management.    History of cerebral palsy  -Continue home medications when verified.    Cardiac, hypertension, hyperlipidemia  -Continue prior to admission medications including Lipitor, amlodipine when verified.    History of anxiety, depression: Stable.  -Continue prior to admission sertraline when verified.    History of GERD: Patient previously on omeprazole.  -Continue prior to admission omeprazole when verified.         DVT Prophylaxis: Pneumatic Compression Devices    Code Status:  full.    Clinically Significant Risk Factors Present on Admission                  # Hypertension: Noted on problem list               Disposition Plan           Moose Piña DO  Hospitalist Service  [facility]  Securely message with [employer] (more info)  Text page via [employer] Paging/Directory     ______________________________________________________________________    Chief Complaint   Vulnerable adult        History of Present Illness   Ambrocio Delvalle is a 63 year old male admitted on 7/29/2023.     Patient presenting to the emergency department after his mother had to present to the emergency department for concern of stroke, patient with no caregiver at home while his mother is being admitted for stroke evaluation.      Past Medical History    Past Medical History:   Diagnosis Date    Cerebral palsy (H)     Chronic cough 2021    seen by pulm in 2022, ct chest done, felt due to chronic aspiration and reflux     Depression, major, in partial remission (H)     High cholesterol     Hypertension     diarrhea with diovan, added norvasc 2021    Low HDL (under 40)     Lumbar scoliosis     Lung nodule 2002    fu benign    Microhematuria 2007    ct neg cysto inflammation    Nasal congestion     Nephrolithiasis 1986    urol eval Dr. Beverly    Osteoporosis 2006, fu 2010    dexa 11/10 -1.5 fem neck and -2.9 total hip; fu 2016 and worse, -4.0 hip; seen by Dr. Gale 10/19 and started prolia    Pharyngitis 2002    hosp fsd    Pulmonary HTN (H) 2008    seen on echo, fu 12/16 not seen, nl ef, grade 1 dd    Reflex sympathetic dystrophy of lower extremity     Right ventricular hypertrophy 2008 on echo    ?due to sandie, had fu with Dr. Gaitan, never did sleep study, fu echo 12/16 no rvh seen    Sweats, sweating, excessive 2008    echo with rvh and pulm htn, ct chest, abd and pelvis with liver cysts and old lung nodule, cosyntropin stim test neg    Urethral stricture 2007    had cysto and ct done for hematuria as well    Vitamin D deficiency        Past Surgical History   Past Surgical History:   Procedure Laterality Date    GENITOURINARY SURGERY      lithortipsy,  ureteral widened    ORTHOPEDIC SURGERY      left knee- abductors lengthed heel cords lengthened, tendons trransferred , patella lowered,       Prior to Admission Medications   Prior to Admission Medications   Prescriptions Last Dose Informant Patient Reported? Taking?   FLONASE 50 MCG/ACT nasal spray   No No   Sig: Spray 1 spray into both nostrils daily   LIPITOR 20 MG tablet   No No   Sig: TAKE 1 TABLET (20 MG) BY MOUTH DAILY *PFIZER BRAND* Strength: 20 mg   ORDER FOR DME   No No   Sig: Equipment being ordered: Hospital Bed   ORDER FOR DME   No No   Sig: Equipment being ordered: Wheelchair   Vitamin D3 (CHOLECALCIFEROL) 125 MCG (5000 UT) tablet   Yes No   Sig: Take by mouth daily   amLODIPine (NORVASC) 2.5 MG tablet   No No   Sig: Take 1 tablet (2.5 mg) by mouth daily   baclofen  (LIORESAL) 20 MG tablet   No No   Sig: Take 1 tablet (20 mg) by mouth 5 times daily   cetirizine (ZYRTEC) 10 MG tablet   Yes No   Sig: Take 10 mg by mouth daily   denosumab (PROLIA) 60 MG/ML SOSY injection   No No   Sig: Inject 1 mL (60 mg) Subcutaneous every 6 months   diazepam (VALIUM) 5 MG tablet   No No   Sig: TAKE 1 TABLET (5 MG) BY MOUTH NIGHTLY AS NEEDED   gabapentin (NEURONTIN) 100 MG capsule   No No   Sig: Take 1 capsule (100 mg) by mouth 3 times daily   ibuprofen (ADVIL/MOTRIN) 200 MG tablet   Yes No   Sig: Take 400 mg by mouth every 8 hours as needed for mild pain   omeprazole (PRILOSEC) 20 MG DR capsule   No No   Sig: Take 1 capsule (20 mg) by mouth daily   order for DME   No No   Sig: Equipment being ordered: Wheelchair    Power wheelchair with tilt and recline function   potassium chloride ER (KLOR-CON) 8 MEQ CR tablet   No No   Sig: TAKE 2 TABLETS (16 MEQ) BY MOUTH DAILY.   sertraline (ZOLOFT) 100 MG tablet   No No   Sig: TAKE 2 TABLETS BY MOUTH DAILY *[facility]*   triamterene-HCTZ (DYAZIDE) 37.5-25 MG capsule   No No   Sig: TAKE 1 CAPSULE BY MOUTH EVERY MORNING *SANDOZ* Strength: 37.5-25 mg      Facility-Administered Medications Last Administration Doses Remaining   denosumab (PROLIA) injection 60 mg 8/12/2022  1:42 PM               Physical Exam   Vital Signs: Temp: (P) 98  F (36.7  C) Temp src: (P) Oral BP: (!) 143/99 Pulse: 112   Resp: 20 SpO2: 96 % O2 Device: (P) None (Room air)    Weight: 0 lbs 0 oz    GENERAL: Alert. NAD. Conversational, appropriate. Cerebral palsy.   HEENT: Normocephalic. Nares normal.   LUNGS: Clear to auscultation. No dyspnea at rest.   HEART: Regular rate. Extremities perfused.   ABDOMEN: Soft, nontender, and nondistended. Positive bowel sounds.   NEUROLOGIC: cerebral palsy.         Medical Decision Making       42 MINUTES SPENT BY ME on the date of service doing chart review, history, exam, documentation & further activities per the note.      Data     I have  personally reviewed the following data over the past 24 hrs:    10.3  \   17.0   / 355     N/A N/A N/A /  N/A   N/A N/A N/A \

## 2023-07-29 NOTE — ED NOTES
[facility]  ED Nurse Handoff Report    ED Chief complaint: vulnerable adult      ED Diagnosis:   Final diagnoses:   Cerebral palsy, unspecified type (H)       Code Status: To be addressed by admitting provider    Allergies:   Allergies   Allergen Reactions    Penicillins Hives    Atorvastatin      Muscle aches to generic    Simvastatin      Muscle aches       Patient Story: Hx of cerebral palsy, mother is primary caretaker. EMS called to house to take mother into ED for medical concerns. EMS/police unable to locate family or friends to assist pt and he was brought in as vulnerable adult.   Focused Assessment:  Alert and oriented X 4. Up with lift. Continent. Denies pain, nausea. Concerned about mom. Needs SW for safe discharge plan while mom is hospitalized.    Treatments and/or interventions provided: labs  Patient's response to treatments and/or interventions: TBD    To be done/followed up on inpatient unit:  Admission orders    Does this patient have any cognitive concerns?: Forgetful    Activity level - Baseline/Home:  Total Care  Activity Level - Current:   Total Care    Patient's Preferred language: English   Needed?: No    Isolation: None  Infection: Not Applicable  Patient tested for COVID 19 prior to admission: NO  Bariatric?: No    Vital Signs:   Vitals:    07/29/23 1646   BP: (!) 143/99   Pulse: 112   Resp: 20   Temp: (P) 98  F (36.7  C)   TempSrc: (P) Oral   SpO2: 96%       Cardiac Rhythm:     Was the PSS-3 completed:   Yes  What interventions are required if any?               Family Comments: Mother also hospitalized in ED, updated.  OBS brochure/video discussed/provided to patient/family: Yes              Name of person given brochure if not patient: na              Relationship to patient: na    For the majority of the shift this patient's behavior was Green.   Behavioral interventions performed were na.    ED NURSE PHONE NUMBER: [phone]

## 2023-07-29 NOTE — ED TRIAGE NOTES
Per EMS: BIBA after mother, who is primary caretaker was taken into [facility] ER for medical concerns. EMS/police attempted to locate other family members/friends that could assist with pt but they were unsuccessful. Pt does not have any medical complaints upon arrival, just concerned about mother's status.

## 2023-07-29 NOTE — PHARMACY-ADMISSION MEDICATION HISTORY
Pharmacist Admission Medication History    Admission medication history is complete. The information provided in this note is only as accurate as the sources available at the time of the update.    Medication reconciliation/reorder completed by provider prior to medication history? No    Information Source(s): Patient, Family member, and CareEverywhere/SureScripts via in-person    Pertinent Information:   Med history completed with patient and his mother who is his primary caretaker and helps with his medications. Per his mother, he is supposed to take brand name lipitor although has not been taking it because it is not covered by insurance. No recent Surescripts fills.   Both patient and his mother report he is not taking omeprazole so this was marked as not taking. Last filled 12/28/22.   Last dose of Prolia was August 2022. The delay is due to difficulty scheduling with his clinic.    Changes made to PTA medication list:  Added: apap  Deleted: ibuprofen, gabapentin  Changed: flonase    Medication Affordability:  Not including over the counter (OTC) medications, was there a time in the past 3 months when you did not take your medications as prescribed because of cost?: Yes    Allergies reviewed with patient and updates made in EHR: yes    Medication History Completed By: Lisa Figueredo RPH 7/29/2023 6:11 PM    Prior to Admission medications    Medication Sig Last Dose Taking? Auth Provider Long Term End Date   acetaminophen (TYLENOL) 325 MG tablet Take 325 mg by mouth every 6 hours as needed for mild pain Unknown at PRN Yes Unknown, Entered By History     amLODIPine (NORVASC) 2.5 MG tablet Take 1 tablet (2.5 mg) by mouth daily 7/28/2023 Yes Ezekiel Quevedo MD Yes    baclofen (LIORESAL) 20 MG tablet Take 1 tablet (20 mg) by mouth 5 times daily 7/29/2023 at 2-3 doses so far Yes Ezekiel Quevedo MD Yes    cetirizine (ZYRTEC) 10 MG tablet Take 10 mg by mouth daily 7/28/2023 Yes Reported, Patient     denosumab  (PROLIA) 60 MG/ML SOSY injection Inject 1 mL (60 mg) Subcutaneous every 6 months 8/12/2022 Yes Kurtis Gale MD Yes    diazepam (VALIUM) 5 MG tablet TAKE 1 TABLET (5 MG) BY MOUTH NIGHTLY AS NEEDED Unknown at PRN Yes Ezekiel Quevedo MD Yes    FLONASE 50 MCG/ACT nasal spray Spray 1 spray into both nostrils daily  Patient taking differently: Spray 1-2 sprays into both nostrils daily 7/28/2023 Yes Jaymie Alberto, APRN CNP Yes    potassium chloride ER (KLOR-CON) 8 MEQ CR tablet TAKE 2 TABLETS (16 MEQ) BY MOUTH DAILY. 7/28/2023 Yes Ezekiel Quevedo MD Yes    sertraline (ZOLOFT) 100 MG tablet TAKE 2 TABLETS BY MOUTH DAILY *[employer] BRAND* 7/28/2023 Yes Ezekiel Quevedo MD Yes    triamterene-HCTZ (DYAZIDE) 37.5-25 MG capsule TAKE 1 CAPSULE BY MOUTH EVERY MORNING *SANDOZ* Strength: 37.5-25 mg 7/28/2023 Yes Ezekiel Quevedo MD Yes    Vitamin D3 (CHOLECALCIFEROL) 125 MCG (5000 UT) tablet Take by mouth daily 7/28/2023 Yes Reported, Patient     LIPITOR 20 MG tablet TAKE 1 TABLET (20 MG) BY MOUTH DAILY *PFIZER BRAND* Strength: 20 mg  Patient not taking: Reported on 7/29/2023 Not Taking  Ezekiel Quevedo MD Yes    omeprazole (PRILOSEC) 20 MG DR capsule Take 1 capsule (20 mg) by mouth daily  Patient not taking: Reported on 7/29/2023 Not Taking  Ezekiel Quevedo MD     order for DME Equipment being ordered: Wheelchair    Power wheelchair with tilt and recline function   Ezekiel Quevedo MD     ORDER FOR DME Equipment being ordered: Hospital Bed   Ezekiel Quevedo MD     ORDER FOR DME Equipment being ordered: Wheelchair   Ezekiel Quevedo MD

## 2023-07-29 NOTE — ED NOTES
Bed: ED13  Expected date: 7/29/23  Expected time: 4:27 PM  Means of arrival: Ambulance  Comments:  624 64m bed ridden, needs placement eta 1638

## 2023-07-30 LAB
ALBUMIN UR-MCNC: NEGATIVE MG/DL
APPEARANCE UR: CLEAR
BILIRUB UR QL STRIP: NEGATIVE
COLOR UR AUTO: ABNORMAL
GLUCOSE UR STRIP-MCNC: NEGATIVE MG/DL
HGB UR QL STRIP: ABNORMAL
KETONES UR STRIP-MCNC: 10 MG/DL
LEUKOCYTE ESTERASE UR QL STRIP: NEGATIVE
MUCOUS THREADS #/AREA URNS LPF: PRESENT /LPF
NITRATE UR QL: NEGATIVE
PH UR STRIP: 6 [PH] (ref 5–7)
RBC URINE: 9 /HPF
SP GR UR STRIP: 1.01 (ref 1–1.03)
UROBILINOGEN UR STRIP-MCNC: NORMAL MG/DL
WBC URINE: <1 /HPF

## 2023-07-30 PROCEDURE — 258N000003 HC RX IP 258 OP 636: Performed by: INTERNAL MEDICINE

## 2023-07-30 PROCEDURE — 99232 SBSQ HOSP IP/OBS MODERATE 35: CPT | Performed by: INTERNAL MEDICINE

## 2023-07-30 PROCEDURE — G0378 HOSPITAL OBSERVATION PER HR: HCPCS

## 2023-07-30 PROCEDURE — 81001 URINALYSIS AUTO W/SCOPE: CPT | Performed by: EMERGENCY MEDICINE

## 2023-07-30 PROCEDURE — 250N000013 HC RX MED GY IP 250 OP 250 PS 637: Performed by: HOSPITALIST

## 2023-07-30 RX ORDER — SERTRALINE HYDROCHLORIDE 100 MG/1
200 TABLET, FILM COATED ORAL DAILY
Status: DISCONTINUED | OUTPATIENT
Start: 2023-07-31 | End: 2023-08-08 | Stop reason: HOSPADM

## 2023-07-30 RX ADMIN — BACLOFEN 20 MG: 10 TABLET ORAL at 15:00

## 2023-07-30 RX ADMIN — SODIUM CHLORIDE 500 ML: 9 INJECTION, SOLUTION INTRAVENOUS at 17:34

## 2023-07-30 RX ADMIN — BACLOFEN 20 MG: 10 TABLET ORAL at 12:12

## 2023-07-30 RX ADMIN — SERTRALINE HYDROCHLORIDE 100 MG: 100 TABLET ORAL at 08:03

## 2023-07-30 RX ADMIN — AMLODIPINE BESYLATE 2.5 MG: 2.5 TABLET ORAL at 08:04

## 2023-07-30 RX ADMIN — TRIAMTERENE AND HYDROCHLOROTHIAZIDE 1 TABLET: 37.5; 25 TABLET ORAL at 08:04

## 2023-07-30 RX ADMIN — BACLOFEN 20 MG: 10 TABLET ORAL at 08:04

## 2023-07-30 RX ADMIN — BACLOFEN 20 MG: 10 TABLET ORAL at 21:52

## 2023-07-30 RX ADMIN — BACLOFEN 20 MG: 10 TABLET ORAL at 18:45

## 2023-07-30 ASSESSMENT — ACTIVITIES OF DAILY LIVING (ADL)
ADLS_ACUITY_SCORE: 35
ADLS_ACUITY_SCORE: 41
ADLS_ACUITY_SCORE: 39
ADLS_ACUITY_SCORE: 39
ADLS_ACUITY_SCORE: 35
ADLS_ACUITY_SCORE: 39
ADLS_ACUITY_SCORE: 41
ADLS_ACUITY_SCORE: 39

## 2023-07-30 NOTE — PLAN OF CARE
[facility]: [facility]. Mother admitted to hospital and no one to care for pt. Pt has CP and Mother is primary caregiver.     Orientation: A&Ox4  Observation Goals (met & not met): Not met  Activity Level: Lift. WC Bound at BL  Fall Risk: Yes  Behavior & Aggression Tool Color: Green  Pain Management: Denies  ABNL VS/O2: VSS on RA ex. Elevated BP  ABNL Lab/BG: UA still needs to be collected  Diet: Reg  Bowel/Bladder: Cont. Voiding. Uses urinal in bed  Drains/Devices: PIV SL  Tests/Procedures for next shift: SW consult  Anticipated DC date: TBD  Other Important Info:

## 2023-07-30 NOTE — UTILIZATION REVIEW
Concurrent stay review; Secondary Review Determination - [facility]        Under the authority of the Utilization Management Committee, the utilization review process indicated a secondary review on the above patient.  The review outcome is based on review of the medical records, discussions with staff, and applying clinical experience noted on the date of the review.        (x) Observation/outpatient Status Appropriate - Concurrent stay review       RATIONALE FOR DETERMINATION:   63-year-old male with a history of cerebral palsy, cardiac issues, hypertension, hyperlipidemia, anxiety, depression, and GERD was admitted on 7/29/2023. His mother, the primary caregiver, was also admitted for stroke evaluation, leaving him vulnerable without a caregiver at home. Supportive management was provided for the patient.  Patient delayed discharge is related to disposition, there is no medical necessity for inpatient admission at the time of this review. If there is a change in patient status, please resend for review.    The information on this document is developed by the utilization review team in order for the business office to ensure compliance.  This only denotes the appropriateness of proper admission status and does not reflect the quality of care rendered.       The definitions of Inpatient Status and Observation Status used in making the determination above are those provided in the CMS Coverage Manual, Chapter 1 and Chapter 6, section 70.4.       Sincerely,    Arias Chen MD

## 2023-07-30 NOTE — PLAN OF CARE
Observation Goals:     -diagnostic tests and consults completed and resulted: Not met  -vital signs normal or at patient baseline: Not met, HTN  -safe disposition plan has been identified: Not met  Nurse to notify provider when observation goals have been met and patient is ready for discharge.

## 2023-07-30 NOTE — PROGRESS NOTES
SW: [facility] report made. #[id number]    TOMEKA Villarreal  Social Work  [facility]

## 2023-07-30 NOTE — PLAN OF CARE
Observation Goals:     -diagnostic tests and consults completed and resulted: met  -vital signs normal or at patient baseline: Not met  -safe disposition plan has been identified: Not met  Nurse to notify provider when observation goals have been met and patient is ready for discharge.

## 2023-07-30 NOTE — PLAN OF CARE
Summary: Pt has CP, his mother was admitted to hospital and is his primary caregiver. (Placement)  Date & Time: 6449-9108 7/30  Orientation: A&Ox4  Activity Level: Assist of 2 lift (wheel chair baseline)  Fall Risk: Yes  Behavior & Aggression: Green  Diet: Regular  Bowel/Bladder: uses urinal   Drains/Devices: PIV SL  ABNL VS/O2: VSS on RA ex HR elevated (gave bolus)  Anticipated  DC Date: Pending placement

## 2023-07-30 NOTE — PROGRESS NOTES
[facility]    Medicine Progress Note - Hospitalist Service    Date of Admission:  7/29/2023    Assessment & Plan     Ambrocio Delvalle is a 63 year old male admitted on 7/29/2023.      Vulnerable adult, history of cerebral palsy: Patient presenting to the emergency department after his mother had to present to the emergency department for concern of stroke, patient with no caregiver at home while his mother is being admitted for stroke evaluation.  -Supportive management.     History of cerebral palsy  -Continue home medications when verified.     Cardiac, hypertension, hyperlipidemia  -Continue prior to admission medications including Lipitor, amlodipine when verified.     History of anxiety, depression: Stable.  -Continue prior to admission sertraline when verified.     History of GERD: Patient previously on omeprazole.  -Continue prior to admission omeprazole when verified.              DVT Prophylaxis: Pneumatic Compression Devices     Code Status:  full.        Clinically Significant Risk Factors Present on Admission                  # Hypertension: Noted on problem list                   Disposition Plan     Diet: Regular Diet Adult    DVT Prophylaxis: Pneumatic Compression Devices  Vargas Catheter: Not present  Lines: None     Cardiac Monitoring: None  Code Status: Full Code      Clinically Significant Risk Factors Present on Admission                  # Hypertension: Noted on problem list               Disposition Plan      Expected Discharge Date: 07/31/2023                  Ana Farmer MD  Hospitalist Service  [facility]  Securely message with [employer] (more info)  Text page via [employer] Paging/Directory   ______________________________________________________________________    Interval History   Resting in bed. Denies any new complaints. Stabke since admission     Physical Exam   Vital Signs: Temp: 98.4  F (36.9  C) Temp src: Oral BP: (!) 164/91 Pulse: 81    Resp: 18 SpO2: (P) 96 % O2 Device: (P) None (Room air)    Weight: 143 lbs 4.78 oz    General Appearance: Alert, awake, NAD   Respiratory: CTA b/l   Cardiovascular: RRR  GI: soft, NT, ND, BS+  Skin:   Other:      Medical Decision Making       45 MINUTES SPENT BY ME on the date of service doing chart review, history, exam, documentation & further activities per the note.      Data     I have personally reviewed the following data over the past 24 hrs:    10.3  \   17.0   / 355     141 101 9.9 /  95   3.8 25 0.76 \       Imaging results reviewed over the past 24 hrs:   No results found for this or any previous visit (from the past 24 hour(s)).

## 2023-07-30 NOTE — PROGRESS NOTES
MD Notification    Notified Person: MD    Notified Person Name: Ana Farmer    Notification Date/Time: 7/30/23     Notification Interaction: vocera     Purpose of Notification:     Orders Received: [facility] of     Comments:

## 2023-07-30 NOTE — PLAN OF CARE
Observation Goals:     -diagnostic tests and consults completed and resulted: met  -vital signs normal or at patient baseline: Not met, HTN  -safe disposition plan has been identified: Not met  Nurse to notify provider when observation goals have been met and patient is ready for discharge.

## 2023-07-31 PROCEDURE — 99239 HOSP IP/OBS DSCHRG MGMT >30: CPT | Performed by: INTERNAL MEDICINE

## 2023-07-31 PROCEDURE — 250N000011 HC RX IP 250 OP 636: Performed by: HOSPITALIST

## 2023-07-31 PROCEDURE — 250N000013 HC RX MED GY IP 250 OP 250 PS 637: Performed by: HOSPITALIST

## 2023-07-31 PROCEDURE — 250N000013 HC RX MED GY IP 250 OP 250 PS 637: Performed by: INTERNAL MEDICINE

## 2023-07-31 PROCEDURE — G0378 HOSPITAL OBSERVATION PER HR: HCPCS

## 2023-07-31 RX ADMIN — ONDANSETRON 4 MG: 4 TABLET, ORALLY DISINTEGRATING ORAL at 20:46

## 2023-07-31 RX ADMIN — BACLOFEN 20 MG: 10 TABLET ORAL at 11:54

## 2023-07-31 RX ADMIN — BACLOFEN 20 MG: 10 TABLET ORAL at 21:00

## 2023-07-31 RX ADMIN — SERTRALINE HYDROCHLORIDE 200 MG: 100 TABLET ORAL at 09:56

## 2023-07-31 RX ADMIN — BACLOFEN 20 MG: 10 TABLET ORAL at 14:31

## 2023-07-31 RX ADMIN — BACLOFEN 20 MG: 10 TABLET ORAL at 09:57

## 2023-07-31 RX ADMIN — BACLOFEN 20 MG: 10 TABLET ORAL at 17:14

## 2023-07-31 RX ADMIN — AMLODIPINE BESYLATE 2.5 MG: 2.5 TABLET ORAL at 09:57

## 2023-07-31 RX ADMIN — TRIAMTERENE AND HYDROCHLOROTHIAZIDE 1 TABLET: 37.5; 25 TABLET ORAL at 09:57

## 2023-07-31 ASSESSMENT — ACTIVITIES OF DAILY LIVING (ADL)
ADLS_ACUITY_SCORE: 50
ADLS_ACUITY_SCORE: 41
ADLS_ACUITY_SCORE: 41
ADLS_ACUITY_SCORE: 46
ADLS_ACUITY_SCORE: 41
ADLS_ACUITY_SCORE: 41
ADLS_ACUITY_SCORE: 46
ADLS_ACUITY_SCORE: 41
ADLS_ACUITY_SCORE: 46
ADLS_ACUITY_SCORE: 46

## 2023-07-31 NOTE — PLAN OF CARE
Goal Outcome Evaluation:      Plan of Care Reviewed With: patient    Overall Patient Progress: improvingOverall Patient Progress: improving    Summary:  7/29/23 admit for vulnerable adult-Mother admitted for evaluation-she is his primary caregiver at home.  Care Plan Summary Note:  Orientation: A&Ox4  Observation Goals:   PRIOR TO DISCHARGE       -diagnostic tests and consults completed and resulted-Met.  -vital signs normal or at patient baseline-Met.  -safe disposition plan has been identified-Not met.  Nurse to notify provider when observation goals have been met and patient is ready for discharge.  Activity Level: Lift. Mechanical W/C at baseline.  Fall Risk: Yes  Behavior & Aggression Tool Color: Green  Pain Management: Denies.  ABNL VS/O2: VSS on room air, lungs clear.  ABNL Lab/BG:   Diet: Regular-feeds self, takes pills with pop/juice/applesauce at times.  Bowel/Bladder: Continent, uses urinal in bed. No BM.  Drains/Devices: PIV SL Right arm.  Tests/Procedures for next shift: SW consult  Anticipated DC date: TBD  Other Important Info: will discharge when his mother discharges.    Summary: Pt has CP, his mother was admitted to hospital and is his primary caregiver. (Placement)

## 2023-07-31 NOTE — PLAN OF CARE
[id number]    Pt A/Ox4, Regular diet, Mechanical wheelchair at baseline. Continent of bowel and bladder.

## 2023-07-31 NOTE — PROGRESS NOTES
Summary:    Care Plan Summary Note:  Orientation: A&Ox4  Observation Goals (met & not met): Not met  Activity Level: Lift. WC Bound at BL  Fall Risk: Yes  Behavior & Aggression Tool Color: Green  Pain Management: Denies  ABNL VS/O2: VSS on RA ex. Elevated HR  ABNL Lab/BG: See Chart  Diet: Reg  Bowel/Bladder: Cont. Voiding. Uses urinal in bed  Drains/Devices: PIV SL  Tests/Procedures for next shift: SW consult  Anticipated DC date: TBD  Other Important Info:       Summary: Pt has CP, his mother was admitted to hospital and is his primary caregiver. (Placement)

## 2023-07-31 NOTE — CONSULTS
Care Management Initial Consult    General Information  Assessment completed with: Patient,    Type of CM/SW Visit: Initial Assessment    Primary Care Provider verified and updated as needed:     Readmission within the last 30 days:        Reason for Consult: discharge planning  Advance Care Planning:            Communication Assessment  Patient's communication style: spoken language (English or Bilingual)    Hearing Difficulty or Deaf: yes     Cognitive  Cognitive/Neuro/Behavioral: WDL                      Living Environment:   People in home: parent(s)  Ivette  Current living Arrangements: house      Able to return to prior arrangements: yes     Family/Social Support:  Care provided by: parent(s)  Provides care for:    Marital Status: Single  Parent(s)          Description of Support System: Supportive , Involved      Current Resources:   Patient receiving home care services:       Community Resources:    Equipment currently used at home:    Supplies currently used at home:      Employment/Financial:  Employment Status:          Financial Concerns:   Medicare Medicaid    Does the patient's insurance plan have a 3 day qualifying hospital stay waiver?      Lifestyle & Psychosocial Needs:  Social Determinants of Health     Tobacco Use: Low Risk  (3/20/2023)    Patient History     Smoking Tobacco Use: Never     Smokeless Tobacco Use: Never     Passive Exposure: Not on file   Alcohol Use: Not on file   Financial Resource Strain: Low Risk  (11/24/2020)    Overall Financial Resource Strain (CARDIA)     Difficulty of Paying Living Expenses: Not hard at all   Food Insecurity: No Food Insecurity (11/24/2020)    Hunger Vital Sign     Worried About Running Out of Food in the Last Year: Never true     Ran Out of Food in the Last Year: Never true   Transportation Needs: No Transportation Needs (11/24/2020)    PRAPARE - Transportation     Lack of Transportation (Medical): No     Lack of Transportation (Non-Medical): No   Physical  "Activity: Not on file   Stress: No Stress Concern Present (11/24/2020)    [employer] [place] [employer] - Occupational Stress Questionnaire     Feeling of Stress : Not at all   Social Connections: Socially Isolated (11/24/2020)    Social Connection and Isolation Panel [NHANES]     Frequency of Communication with Friends and Family: Never     Frequency of Social Gatherings with Friends and Family: More than three times a week     Attends Scientologist Services: Never     Active Member of Clubs or Organizations: No     Attends Club or Organization Meetings: Never     Marital Status: Never    Intimate Partner Violence: Not on file   Depression: Not at risk (1/26/2023)    PHQ-2     PHQ-2 Score: 0   Housing Stability: Not on file       Functional Status:  Prior to admission patient needed assistance:   Pt lives at home with his mother assisting him. He has cerebral palsy and has been admitted due to his mother needing hospital care.  He has an electric w/c at home, ramp,  hospital bed, sliding shower chair, mendez lift. Pt states that he does not lack any equipment or care.   Pt states that he has been to [facility] before and it was the worst experience of his life. He said \"the care was terrible, almost non existent .\"     Mental Health Status:        Chemical Dependency Status:         Values/Beliefs:  Spiritual, Cultural Beliefs, Scientologist Practices, Values that affect care:      Pt and his mother are members of [employer] He states they are active with the [employer] and they are looking into getting a Care-n-Share        Additional Information:  SW received call from AP worker following up on report. Pt is eligible for PCA services but pt's mother has declined the services. SP worker is attempting to contact his  to follow up with him again regarding services.  Pt states that he and his mother are talking about ACD and what plan would be if his mother can no longer care for him at " home. He states that no decision has been made about HCA yet but if his mother could no longer care for him, he would prefer to be able to stay in his home.  SW updated pt that he would be able to discharge once his mother is discharged. He agrees to this plan and has no further questions.Discharge orders in [facility].  APS worker updated    TOMEKA Au  [facility]  Care Transitions  [phone]

## 2023-07-31 NOTE — DISCHARGE SUMMARY
"[facility]  Hospitalist Discharge Summary      Date of Admission:  7/29/2023  Date of Discharge:  8/4/2023  Discharging Provider: Ana Farmer MD  Discharge Service: Hospitalist Service    Discharge Diagnoses   Please see hospital course    Clinically Significant Risk Factors     # Overweight: Estimated body mass index is 27.51 kg/m  as calculated from the following:    Height as of this encounter: 1.676 m (5' 6\").    Weight as of this encounter: 77.3 kg (170 lb 6.7 oz).       Follow-ups Needed After Discharge   Follow-up Appointments     Follow-up and recommended labs and tests       F/u with PCP as scheduled or as needed           Unresulted Labs Ordered in the Past 30 Days of this Admission       No orders found from 6/29/2023 to 7/30/2023.            Discharge Disposition   Discharged to home  Condition at discharge: Stable    Hospital Course   Ambrocio Delvalle is a 63 year old male admitted on 7/29/2023.      Vulnerable adult, history of cerebral palsy: Patient presenting to the emergency department after his mother had to present to the emergency department for concern of stroke, patient with no caregiver at home while his mother is being admitted for stroke evaluation.  -Supportive management.  His mother is being discharged from hospital today and she wants to take him home and take care of him at home care coordinator arranging transportation for both patient and his mother today  Patient is being discharged to long-term care facility as his mother is going to TCU       History of cerebral palsy  -Continue home medications      Cardiac, hypertension, hyperlipidemia  -Continue prior to admission medications amlodipine and Lipitor       History of anxiety, depression: Stable.  -Continue prior to admission sertraline when verified.     History of GERD: Patient previously on omeprazole.  -Continue prior to admission omeprazole when verified.              DVT Prophylaxis: Pneumatic " Compression Devices     Code Status:  full.        Clinically Significant Risk Factors Present on Admission                  # Hypertension: Noted on problem list                   Disposition Plan    Consultations This Hospital Stay   CARE MANAGEMENT / SOCIAL WORK IP CONSULT    Code Status   Full Code    Time Spent on this Encounter   I, Ana Farmer MD, personally saw the patient today and spent greater than 30 minutes discharging this patient.       Ana Farmer MD  [facility] NEUROSCIENCE UNIT  [address] [place] [address]  Phone: [phone]  ______________________________________________________________________    Physical Exam   Vital Signs: Temp: 97.9  F (36.6  C) Temp src: Oral BP: (!) 153/92 Pulse: 88   Resp: 18 SpO2: 96 % O2 Device: None (Room air)    Weight: 170 lbs 6.65 oz  General Appearance: Resting comfortably in bed, not in acute distress  Respiratory: Clear to auscultation bilaterally  Cardiovascular: Normal rate rhythm regular  GI: Soft, nontender, nondistended, bowel sounds positive  Skin:   Other:         Primary Care Physician   Ezekiel Quevedo    Discharge Orders      Reason for your hospital stay    Mother was hospitalized due to health  issues     Follow-up and recommended labs and tests     F/u with PCP as scheduled or as needed     Activity    Your activity upon discharge: activity as tolerated     Diet    Follow this diet upon discharge: Orders Placed This Encounter      Regular Diet Adult       Significant Results and Procedures   Most Recent 3 CBC's:  Recent Labs   Lab Test 07/29/23 1737 03/20/23  1516 01/10/23  0950   WBC 10.3 11.7* 7.8   HGB 17.0 17.0 16.8   MCV 89 89 91    320 308     Most Recent 3 BMP's:  Recent Labs   Lab Test 07/29/23 1737 03/20/23  1516 01/10/23  0950    141 142   POTASSIUM 3.8 3.8 3.6   CHLORIDE 101 102 105   CO2 25 27 31   BUN 9.9 19.2 22   CR 0.76 0.77 0.72   ANIONGAP 15 12 6   ROBINA 9.5 9.2 8.9   GLC 95 99 92     Most  Recent 2 LFT's:  Recent Labs   Lab Test 01/10/23  0950 07/16/19  1310   AST 12 20   ALT 19 31   ALKPHOS 116 126   BILITOTAL 1.2 0.4   ,   Results for orders placed or performed during the hospital encounter of 03/20/23   US Lower Extremity Venous Duplex Bilateral    Narrative    [facility]  LOCATION: [facility]  DATE: 3/20/2023    EXAM: BILATERAL LOWER EXTREMITY VENOUS ULTRASOUND    INDICATION: Leg pain and swelling     TECHNIQUE: Duplex imaging was performed utilizing gray-scale,  compression, augmentation as appropriate, color-flow, Doppler waveform  analysis, and spectral Doppler imaging.    COMPARISON: None.    FINDINGS:  RIGHT LEG: The common femoral, profunda femoral, femoral, popliteal,  and segmentally visualized calf veins are patent and compressible with  appropriate vascular waveforms. No deep venous thrombus is identified.    LEFT LEG: The common femoral, profunda femoral, femoral, popliteal,  and segmentally visualized calf veins are patent and compressible with  appropriate vascular waveforms. No deep venous thrombus is identified.      Impression    IMPRESSION:   1.  RIGHT LEG: Negative for deep vein thrombosis.  2.  LEFT LEG: Negative for deep vein thrombosis.    CONNER SUNG MD         SYSTEM ID:  [id number]   XR Foot Right 3 Views    Narrative    FOOT RIGHT THREE OR MORE VIEWS   TIBIA AND FIBULA RIGHT TWO VIEWS  DATE/TIME: 3/20/2023 4:24 PM     INDICATION: Right lower extremity and foot pain.   COMPARISON: None.      Impression    IMPRESSION:  1.  Acute mildly displaced spiral fracture of the right tibia distal  metadiaphysis. Possible fracture extension into the tibial plafond and  ankle joint space. This could be further evaluated with noncontrast CT  if clinically indicated.  2.  No joint malalignment.  3.  Staple fixation in the tibia proximal metaphysis.  4.  Moderate to advanced bone demineralization.    SHERMAN KWONG MD         SYSTEM ID:  [id number]   XR Tibia &  Fibula Right 2 Views    Narrative    FOOT RIGHT THREE OR MORE VIEWS   TIBIA AND FIBULA RIGHT TWO VIEWS  DATE/TIME: 3/20/2023 4:24 PM     INDICATION: Right lower extremity and foot pain.   COMPARISON: None.      Impression    IMPRESSION:  1.  Acute mildly displaced spiral fracture of the right tibia distal  metadiaphysis. Possible fracture extension into the tibial plafond and  ankle joint space. This could be further evaluated with noncontrast CT  if clinically indicated.  2.  No joint malalignment.  3.  Staple fixation in the tibia proximal metaphysis.  4.  Moderate to advanced bone demineralization.    SHERMAN KWONG MD         SYSTEM ID:  [id number]   XR Pelvis w Hip Left 1 View    Narrative    PELVIS AND HIP LEFT ONE VIEW  DATE/TIME: 3/20/2023 4:24 PM     INDICATION: Left-sided hip pain.   COMPARISON: 1/24/2023.      Impression    IMPRESSION:  1.  No fracture or joint malalignment.  2.  Mild left hip degenerative arthrosis, stable.  3.  Dysplastic-appearing pelvis, unchanged.    SHERMAN KWONG MD         SYSTEM ID:  [id number]   CT Tibia Fibula Lower Leg Right wo Contrast    Narrative    EXAM: CT TIBIA FIBULA LOWER LEG RIGHT WO CONTRAST  LOCATION: [facility]  DATE/TIME: 3/20/2023 6:07 PM    INDICATION: fracture, follow up to x ray  COMPARISON: Radiographs from earlier today  TECHNIQUE: Noncontrast. Axial, sagittal and coronal thin-section reconstruction. Dose reduction techniques were used.     FINDINGS:     BONES:  -Spiral fracture of the distal tibial metadiaphysis with 3 mm anterior and medial displacement. There is callus formation with early osseous bridging. The fibular appears intact. Normal joint alignment. Marked osteopenia. Staple fixation of the tibial   tuberosity. No hardware complication. The ankle joint appears to be rotated 90 degrees relative to the knee joint. Chronic deformities throughout the foot.    SOFT TISSUES:  -No significant soft tissue swelling. No discrete  fluid collection. Diffuse muscle atrophy. No definite tendon injury.      Impression    IMPRESSION:  1.  Healing spiral fracture of the distal tibial metadiaphysis. There is callus formation with early osseous bridging.   2.  Other ancillary findings as described above.     XR Tibia & Fibula Right 2 Views    Narrative    EXAM: XR TIBIA AND FIBULA RIGHT 2 VIEWS  LOCATION: [facility]  DATE/TIME: 3/20/2023 9:36 PM    INDICATION: post splint  COMPARISON: CT from earlier today      Impression    IMPRESSION: Spiral fracture of the distal tibial diaphysis with similar alignment. Mild callus formation. Osteopenia. Otherwise unchanged.   XR Chest 1 View    Narrative    EXAM: XR CHEST 1 VIEW  LOCATION: [facility]  DATE/TIME: 3/20/2023 9:36 PM    INDICATION: Left chest wall pain.  COMPARISON: 11/12/2021      Impression    IMPRESSION:     No focal airspace disease. No pleural effusion or pneumothorax.    Calcified nodule in the left midlung, compatible with old granulomatous disease.    The cardiomediastinal silhouette is unremarkable.       Discharge Medications   Current Discharge Medication List        CONTINUE these medications which have NOT CHANGED    Details   acetaminophen (TYLENOL) 325 MG tablet Take 325 mg by mouth every 6 hours as needed for mild pain      amLODIPine (NORVASC) 2.5 MG tablet Take 1 tablet (2.5 mg) by mouth daily  Qty: 90 tablet, Refills: 3    Associated Diagnoses: Benign essential hypertension      baclofen (LIORESAL) 20 MG tablet Take 1 tablet (20 mg) by mouth 5 times daily  Qty: 450 tablet, Refills: 3    Associated Diagnoses: Spasm      cetirizine (ZYRTEC) 10 MG tablet Take 10 mg by mouth daily      denosumab (PROLIA) 60 MG/ML SOSY injection Inject 1 mL (60 mg) Subcutaneous every 6 months  Qty: 1 mL, Refills: 1    Associated Diagnoses: Osteoporosis without current pathological fracture, unspecified osteoporosis type      diazepam (VALIUM) 5 MG tablet  TAKE 1 TABLET (5 MG) BY MOUTH NIGHTLY AS NEEDED  Qty: 30 tablet, Refills: 0    Associated Diagnoses: Spasm      FLONASE 50 MCG/ACT nasal spray Spray 1 spray into both nostrils daily  Qty: 16 g, Refills: 5      potassium chloride ER (KLOR-CON) 8 MEQ CR tablet TAKE 2 TABLETS (16 MEQ) BY MOUTH DAILY.  Qty: 180 tablet, Refills: 3    Associated Diagnoses: Muscle spasm; Benign essential hypertension      sertraline (ZOLOFT) 100 MG tablet TAKE 2 TABLETS BY MOUTH DAILY *[employer] BRAND*  Qty: 180 tablet, Refills: 3    Associated Diagnoses: Recurrent major depressive disorder, in partial remission (H)      triamterene-HCTZ (DYAZIDE) 37.5-25 MG capsule TAKE 1 CAPSULE BY MOUTH EVERY MORNING *SANDOZ* Strength: 37.5-25 mg  Qty: 90 capsule, Refills: 3    Associated Diagnoses: Benign essential hypertension      Vitamin D3 (CHOLECALCIFEROL) 125 MCG (5000 UT) tablet Take by mouth daily      LIPITOR 20 MG tablet TAKE 1 TABLET (20 MG) BY MOUTH DAILY *[employer] BRAND* Strength: 20 mg  Qty: 90 tablet, Refills: 3    Associated Diagnoses: Hyperlipidemia LDL goal <130      omeprazole (PRILOSEC) 20 MG DR capsule Take 1 capsule (20 mg) by mouth daily  Qty: 90 capsule, Refills: 3    Associated Diagnoses: Chronic cough      !! order for DME Equipment being ordered: Wheelchair    Power wheelchair with tilt and recline function  Qty: 1 Units, Refills: 0    Associated Diagnoses: Spastic quadriplegic cerebral palsy (H)      !! ORDER FOR DME Equipment being ordered: Hospital Bed  Qty: 1 Units, Refills: 0    Associated Diagnoses: Cerebral palsy (H); Reflex sympathetic dystrophy of lower extremity, bilateral      !! ORDER FOR DME Equipment being ordered: Wheelchair  Qty: 1 Units, Refills: 0    Associated Diagnoses: Cerebral palsy (H); Reflex sympathetic dystrophy of lower extremity, bilateral       !! - Potential duplicate medications found. Please discuss with provider.        Allergies   Allergies   Allergen Reactions    Penicillins Hives     Atorvastatin      Muscle aches to generic    Simvastatin      Muscle aches

## 2023-08-01 PROCEDURE — 99232 SBSQ HOSP IP/OBS MODERATE 35: CPT | Performed by: INTERNAL MEDICINE

## 2023-08-01 PROCEDURE — 250N000013 HC RX MED GY IP 250 OP 250 PS 637: Performed by: HOSPITALIST

## 2023-08-01 PROCEDURE — G0378 HOSPITAL OBSERVATION PER HR: HCPCS

## 2023-08-01 PROCEDURE — 250N000013 HC RX MED GY IP 250 OP 250 PS 637: Performed by: INTERNAL MEDICINE

## 2023-08-01 PROCEDURE — 99207 PR CDG-CUT & PASTE-POTENTIAL IMPACT ON LEVEL: CPT | Performed by: INTERNAL MEDICINE

## 2023-08-01 RX ADMIN — BACLOFEN 20 MG: 10 TABLET ORAL at 14:04

## 2023-08-01 RX ADMIN — SERTRALINE HYDROCHLORIDE 200 MG: 100 TABLET ORAL at 08:48

## 2023-08-01 RX ADMIN — BACLOFEN 20 MG: 10 TABLET ORAL at 21:21

## 2023-08-01 RX ADMIN — BACLOFEN 20 MG: 10 TABLET ORAL at 12:09

## 2023-08-01 RX ADMIN — TRIAMTERENE AND HYDROCHLOROTHIAZIDE 1 TABLET: 37.5; 25 TABLET ORAL at 08:48

## 2023-08-01 RX ADMIN — AMLODIPINE BESYLATE 2.5 MG: 2.5 TABLET ORAL at 08:48

## 2023-08-01 RX ADMIN — BACLOFEN 20 MG: 10 TABLET ORAL at 08:48

## 2023-08-01 RX ADMIN — BACLOFEN 20 MG: 10 TABLET ORAL at 17:42

## 2023-08-01 ASSESSMENT — ACTIVITIES OF DAILY LIVING (ADL)
ADLS_ACUITY_SCORE: 46
ADLS_ACUITY_SCORE: 46
ADLS_ACUITY_SCORE: 42
ADLS_ACUITY_SCORE: 42
ADLS_ACUITY_SCORE: 46
ADLS_ACUITY_SCORE: 46
ADLS_ACUITY_SCORE: 42

## 2023-08-01 NOTE — PLAN OF CARE
Goal Outcome Evaluation:             Reason for Admission: Vulnerable adult - mom admitted for stroke eval    Cognitive/Mentation: A/Ox 4  Neuros/CMS: Intact ex severe mobility deficits in BLE  VS: stable.   Pain: none.   GI: BS active, Continent.  : Continent.  Pulmonary: LS clear.    Tele: None.  Drains/Lines: PIV  Skin: intact  Activity: Assist x 2 with lift.  Diet: regular with thin liquids. Takes pills whole.     Therapies recs: NA  Discharge: TBD, patient would prefer to stay in home and not go to TCU    Aggression Stoplight Tool: green    End of shift summary: patient felt nauseated around 2100, was very sweaty - gave zofran with good effect and cooling measures. No fever.

## 2023-08-01 NOTE — PROGRESS NOTES
"[facility]    Medicine Progress Note - Hospitalist Service    Date of Admission:  7/29/2023    Assessment & Plan     Ambrocio Delvalle is a 63 year old male admitted on 7/29/2023.      Vulnerable adult, history of cerebral palsy: Patient presenting to the emergency department after his mother had to present to the emergency department for concern of stroke, patient with no caregiver at home while his mother is being admitted for stroke evaluation.  -Supportive management.  His mother is admitted at [facility] with intracranial bleeding and will need to coordinate his discharge with her discharge as she is his primary caregiver      History of cerebral palsy  -Continue home medications when verified.     Cardiac, hypertension, hyperlipidemia  -Continue prior to admission medications including Lipitor, amlodipine when verified.     History of anxiety, depression: Stable.  -Continue prior to admission sertraline when verified.     History of GERD: Patient previously on omeprazole.  -Continue prior to admission omeprazole when verified.              DVT Prophylaxis: Pneumatic Compression Devices     Code Status:  full.        Clinically Significant Risk Factors Present on Admission                  # Hypertension: Noted on problem list                   Disposition Plan     Diet: Regular Diet Adult  Diet    DVT Prophylaxis: Pneumatic Compression Devices  Vargas Catheter: Not present  Lines: None     Cardiac Monitoring: None  Code Status: Full Code      Clinically Significant Risk Factors Present on Admission                    # Hypertension: Noted on problem list      # Overweight: Estimated body mass index is 27.51 kg/m  as calculated from the following:    Height as of this encounter: 1.676 m (5' 6\").    Weight as of this encounter: 77.3 kg (170 lb 6.7 oz).            Disposition Plan      Expected Discharge Date: 08/01/2023, 12:00 PM  Discharge Delays: *Medically Ready for Discharge  Other " (Add Comment)              Ana Farmer MD  Hospitalist Service  [facility]  Securely message with [employer] (more info)  Text page via [employer] Paging/Directory   ______________________________________________________________________    Interval History   Resting in bed. Denies any new complaints. Stable since admission     Physical Exam   Vital Signs: Temp: 97.4  F (36.3  C) Temp src: Oral BP: (!) 160/98 Pulse: 98   Resp: 16 SpO2: 98 % O2 Device: None (Room air)    Weight: 170 lbs 6.65 oz    General Appearance: Alert, awake, NAD   Respiratory: CTA b/l   Cardiovascular: RRR  GI: soft, NT, ND, BS+  Skin:   Other:      Medical Decision Making       45 MINUTES SPENT BY ME on the date of service doing chart review, history, exam, documentation & further activities per the note.      Data         Imaging results reviewed over the past 24 hrs:   No results found for this or any previous visit (from the past 24 hour(s)).

## 2023-08-01 NOTE — PROGRESS NOTES
-diagnostic tests and consults completed and resulted: met  -vital signs normal or at patient baseline: not met, HTN  -safe disposition plan has been identified: not met

## 2023-08-01 NOTE — PROGRESS NOTES
Summary:  7/29/23 admit for vulnerable adult-Mother admitted for evaluation-she is his primary caregiver at home.  Care Plan Summary Note:  Orientation: A&Ox4  Observation Goals:   PRIOR TO DISCHARGE       -diagnostic tests and consults completed and resulted-Met.  -vital signs normal or at patient baseline-Met.  -safe disposition plan has been identified-Not met.  Nurse to notify provider when observation goals have been met and patient is ready for discharge.  Activity Level: Lift. Mechanical W/C at baseline.  Fall Risk: Yes  Behavior & Aggression Tool Color: Green  Pain Management: Denies.  ABNL VS/O2: VSS on room air, lungs clear.

## 2023-08-02 PROCEDURE — 250N000013 HC RX MED GY IP 250 OP 250 PS 637: Performed by: INTERNAL MEDICINE

## 2023-08-02 PROCEDURE — 250N000013 HC RX MED GY IP 250 OP 250 PS 637: Performed by: HOSPITALIST

## 2023-08-02 PROCEDURE — G0378 HOSPITAL OBSERVATION PER HR: HCPCS

## 2023-08-02 PROCEDURE — 99232 SBSQ HOSP IP/OBS MODERATE 35: CPT | Performed by: INTERNAL MEDICINE

## 2023-08-02 RX ADMIN — BACLOFEN 20 MG: 10 TABLET ORAL at 23:00

## 2023-08-02 RX ADMIN — TRIAMTERENE AND HYDROCHLOROTHIAZIDE 1 TABLET: 37.5; 25 TABLET ORAL at 07:49

## 2023-08-02 RX ADMIN — SERTRALINE HYDROCHLORIDE 200 MG: 100 TABLET ORAL at 07:49

## 2023-08-02 RX ADMIN — AMLODIPINE BESYLATE 2.5 MG: 2.5 TABLET ORAL at 07:49

## 2023-08-02 RX ADMIN — BACLOFEN 20 MG: 10 TABLET ORAL at 11:45

## 2023-08-02 RX ADMIN — BACLOFEN 20 MG: 10 TABLET ORAL at 18:41

## 2023-08-02 RX ADMIN — BACLOFEN 20 MG: 10 TABLET ORAL at 07:48

## 2023-08-02 RX ADMIN — BACLOFEN 20 MG: 10 TABLET ORAL at 15:52

## 2023-08-02 RX ADMIN — SENNOSIDES AND DOCUSATE SODIUM 2 TABLET: 50; 8.6 TABLET ORAL at 07:49

## 2023-08-02 ASSESSMENT — ACTIVITIES OF DAILY LIVING (ADL)
ADLS_ACUITY_SCORE: 42
ADLS_ACUITY_SCORE: 42
ADLS_ACUITY_SCORE: 41
ADLS_ACUITY_SCORE: 42
ADLS_ACUITY_SCORE: 42
ADLS_ACUITY_SCORE: 44
ADLS_ACUITY_SCORE: 41
ADLS_ACUITY_SCORE: 42
ADLS_ACUITY_SCORE: 41

## 2023-08-02 NOTE — PROGRESS NOTES
Care Management Follow Up    Length of Stay (days): 0    Expected Discharge Date: 08/03/2023     Concerns to be Addressed:       Patient plan of care discussed at interdisciplinary rounds: Yes    Anticipated Discharge Disposition: Home    Private pay costs discussed: Not applicable    Additional Information:  SW met with patient while in his mother's room. SW explained to him the options for discharge: going home with [facility] or going to a transitional care facility. It may be possible that he and his mother could go together. He was agreeable to either plan but has had a poor experience with TCU in the past. Plan will depend on what his mother, his primary caregiver, decides on. Social work will continue to follow to arrange discharge plans.      ADD: [id number]   Current plan is to attempt to find TCU placement for patient and his mother at the same facility. Patient would be paying for board and care at SNF, as there are no therapy needs and he is observation status. The same referrals were sent for the patient and for his mother.     Amie Ferris, ROBERT, LGSW   Social Work   [facility]

## 2023-08-02 NOTE — PROGRESS NOTES
[facility]    Medicine Progress Note - Hospitalist Service    Date of Admission:  7/29/2023    Assessment & Plan     Ambrocio Delvalle is a 63 year old male admitted on 7/29/2023.      Vulnerable adult, history of cerebral palsy: Patient presenting to the emergency department after his mother had to present to the emergency department for concern of stroke, patient with no caregiver at home while his mother is being admitted for stroke evaluation.  -Supportive management.  His mother is admitted at [facility] with medical issues  and will need to coordinate his discharge with her discharge as she is his primary caregiver       Family care conference was held with his mother Ivette  and patient Bola , Care coordinator and Ivette's physician hospitalist and me on On 8/2/2023. Both patient and his mother expressed previous bad experience with his TCU stay  Dr. Shelton  discussed with patient's mother Ivette  about her therapist recommending her discharge to TCU as she is weaker than baseline.  She would like  to [facility] into [facility]CU's and see how expensive they are going to be if her son also goes to TCU with her and decide about that    Recurrent care coordinator consulted and are following for safe discharge disposition and possible TCU placement versus going home       History of cerebral palsy  -Continue home medications when verified.     Cardiac, hypertension, hyperlipidemia  -Continue prior to admission medications including Lipitor, amlodipine when verified.     History of anxiety, depression: Stable.  -Continue prior to admission sertraline when verified.     History of GERD: Patient previously on omeprazole.  -Continue prior to admission omeprazole when verified.              DVT Prophylaxis: Pneumatic Compression Devices     Code Status:  full.        Clinically Significant Risk Factors Present on Admission                  # Hypertension: Noted on problem list      "              Disposition Plan     Diet: Regular Diet Adult  Diet  Room Service    DVT Prophylaxis: Pneumatic Compression Devices  Vargas Catheter: Not present  Lines: None     Cardiac Monitoring: None  Code Status: Full Code      Clinically Significant Risk Factors Present on Admission                    # Hypertension: Noted on problem list      # Overweight: Estimated body mass index is 27.51 kg/m  as calculated from the following:    Height as of this encounter: 1.676 m (5' 6\").    Weight as of this encounter: 77.3 kg (170 lb 6.7 oz).              Disposition Plan      Expected Discharge Date: 08/03/2023, 12:00 PM  Discharge Delays: *Medically Ready for Discharge  Other (Add Comment)              Ana Farmer MD  Hospitalist Service  [facility]  Securely message with [employer] (more info)  Text page via [employer] Paging/Directory   ______________________________________________________________________    Interval History   Resting in bed. Denies any new complaints. Stable since admission     Physical Exam   Vital Signs: Temp: 98.1  F (36.7  C) Temp src: Oral BP: (!) 153/103 Pulse: 88   Resp: 18 SpO2: 97 % O2 Device: None (Room air)    Weight: 170 lbs 6.65 oz    General Appearance: Alert, awake, NAD   Respiratory: CTA b/l   Cardiovascular: RRR  GI: soft, NT, ND, BS+  Skin:   Other:      Medical Decision Making       45 MINUTES SPENT BY ME on the date of service doing chart review, history, exam, documentation & further activities per the note.      Data         Imaging results reviewed over the past 24 hrs:   No results found for this or any previous visit (from the past 24 hour(s)).  "

## 2023-08-02 NOTE — PLAN OF CARE
Goal Outcome Evaluation:      Plan of Care Reviewed With: patient, parent    Overall Patient Progress: improvingOverall Patient Progress: improving    Outcome Evaluation: transfers well with lift, tolerated sitting shower chair x 30 minutes and in wheelchair for 1 hour    Pt here with needing physical care due to hx [facility] A&O x4, Neuros baseline--does not walk, upper extremities moderately strong; states he is currently healing from a knee fracture and [facility] recommended no therapy for a while. VSS except slightly elevated blood pressures. regular diet, thin liquids, feeds self with setup. Takes pills whole. Up with assist of mechanical lift (baseline, mom operates one at home); turns & repositions . Denies pain. Pt scoring green on the Aggression Stop Light Tool. Plan possible TCU for board & care if one can be identified that both he and his mom can go to. Discharge pending placement, vs backup plan of home with home health services.

## 2023-08-02 NOTE — PROGRESS NOTES
-diagnostic tests and consults completed and resulted: met  -vital signs normal or at patient baseline: met  -safe disposition plan has been identified: partially met

## 2023-08-02 NOTE — PLAN OF CARE
Goal Outcome Evaluation:    Reason for Admission: Vulnerable Adult /Mother is the primary caregiver and admitted for TIA     Cognitive/Mentation: A&Ox4  Neuros/CMS: Intact ex BLE mobility from CP  VS: VSS on RA  Pain: Denied pain  GI: BS active, no BM   : Continent   Pulmonary: LS clear.     Tele: NA.  Drains/Lines: PIV-SL flushed  Skin: intact  Activity: Assist of 1 with cares and 2 with lift  Diet: Regular with thin liquids.  Set up tray-Takes pills whole with water.      Therapies recs: NA  Discharge: TBD, TCU/ Home with HHC with mom     Aggression Stoplight Tool: green     End of shift summary: Patient is resting comfortably in bed. Will continue with the plan of care.

## 2023-08-02 NOTE — PLAN OF CARE
Goal Outcome Evaluation:               Reason for Admission: vulnerable adult (mom/pcg admitted for TIA)    Cognitive/Mentation: A/Ox 4  Neuros/CMS: Intact ex BLE mobility from CP  VS: stable ex HTN.   Pain: none.   GI: BS active, last BM 7/28 per patient. Continent - bedpan.  : Continent; may go all night without urinating - patient reports this is normal for him.  Pulmonary: LS clear.    Tele: NA.  Drains/Lines: PIV  Skin: intact  Activity: Assist x 1-2 with in bed and 2 with lift for out of bed.  Diet: Regular with thin liquids. Takes pills whole.     Therapies recs: NA  Discharge: TBD, TCU with mom    Aggression Stoplight Tool: green    End of shift summary: Noted to be diaphoretic without any other symptoms - cooling measures provided.

## 2023-08-03 PROCEDURE — G0378 HOSPITAL OBSERVATION PER HR: HCPCS

## 2023-08-03 PROCEDURE — 99232 SBSQ HOSP IP/OBS MODERATE 35: CPT | Performed by: INTERNAL MEDICINE

## 2023-08-03 PROCEDURE — 250N000013 HC RX MED GY IP 250 OP 250 PS 637: Performed by: INTERNAL MEDICINE

## 2023-08-03 PROCEDURE — 250N000013 HC RX MED GY IP 250 OP 250 PS 637: Performed by: HOSPITALIST

## 2023-08-03 RX ADMIN — SERTRALINE HYDROCHLORIDE 200 MG: 100 TABLET ORAL at 08:48

## 2023-08-03 RX ADMIN — ACETAMINOPHEN 650 MG: 325 TABLET, FILM COATED ORAL at 00:54

## 2023-08-03 RX ADMIN — BACLOFEN 20 MG: 10 TABLET ORAL at 12:16

## 2023-08-03 RX ADMIN — AMLODIPINE BESYLATE 2.5 MG: 2.5 TABLET ORAL at 08:49

## 2023-08-03 RX ADMIN — SENNOSIDES AND DOCUSATE SODIUM 2 TABLET: 50; 8.6 TABLET ORAL at 21:15

## 2023-08-03 RX ADMIN — BACLOFEN 20 MG: 10 TABLET ORAL at 17:40

## 2023-08-03 RX ADMIN — BACLOFEN 20 MG: 10 TABLET ORAL at 08:49

## 2023-08-03 RX ADMIN — BACLOFEN 20 MG: 10 TABLET ORAL at 21:15

## 2023-08-03 RX ADMIN — BACLOFEN 20 MG: 10 TABLET ORAL at 14:02

## 2023-08-03 RX ADMIN — TRIAMTERENE AND HYDROCHLOROTHIAZIDE 1 TABLET: 37.5; 25 TABLET ORAL at 08:49

## 2023-08-03 ASSESSMENT — ACTIVITIES OF DAILY LIVING (ADL)
ADLS_ACUITY_SCORE: 44
ADLS_ACUITY_SCORE: 46
ADLS_ACUITY_SCORE: 44
ADLS_ACUITY_SCORE: 44
ADLS_ACUITY_SCORE: 46
ADLS_ACUITY_SCORE: 46
ADLS_ACUITY_SCORE: 44
ADLS_ACUITY_SCORE: 46
ADLS_ACUITY_SCORE: 46
ADLS_ACUITY_SCORE: 44

## 2023-08-03 NOTE — PLAN OF CARE
Patient here as a vulnerable adult, has cerebral palsy. Mom here with TIA and is patients care giver.     A&O x 4. Intact ex eyes dysconjugate at baseline. Mild - moderate weakness. Ax2 w lift. Reg/thin/whole w/ pudding or soda. Continent. No BM since 728- constipation. RA. VSS- HTN. Discharge to TCU or home w mother. No acute changes from [id number]

## 2023-08-03 NOTE — PROGRESS NOTES
Reason for admission: Vulnerable adult, history of cerebral palsy   Mental Status: Ox4  Activity: AX2 CL  Diet: Regular  Pain: Denies  Urination: Continent B/B  Tele/Restraints/Iso: NA  LDA: PIV SL  Expected D/C Date: TBD. Awaiting for placement  Other Info: Patient presented to the emergency department after his mother had to present to the emergency department for concern of stroke, patient with no caregiver at home while his mother is being admitted for stroke evaluation. His mother is admitted at [facility] with medical issues  and will need to coordinate his discharge with her discharge as she is his primary caregiver.

## 2023-08-03 NOTE — PLAN OF CARE
No acute changes. Pt has cerebral palsy. A&Ox4. Ax2 Lift. Has mechanical W/C at baseline. PRN tylenol given x1. VSS on room air, lungs clear. Regular diet, thin liquids, takes pills whole. Mostly continent, uses urinal in bed. No BM overnight. Right PIV SL. Discharge pending, home with HHC with mother vs placement.

## 2023-08-03 NOTE — PROGRESS NOTES
[facility]    Medicine Progress Note - Hospitalist Service    Date of Admission:  7/29/2023    Assessment & Plan     Ambrocio Delvalle is a 63 year old male admitted on 7/29/2023.      Vulnerable adult, history of cerebral palsy: Patient presenting to the emergency department after his mother had to present to the emergency department for concern of stroke, patient with no caregiver at home while his mother is being admitted for stroke evaluation.    His mother is admitted at [facility] with medical issues  and will need to coordinate his discharge with her discharge as she is his primary caregiver       Family care conference was held with his mother Ivette  and patient Bola , Care coordinator and Ivette's physician hospitalist and Dr Farmer on On 8/2/2023. Both patient and his mother expressed previous bad experience with his TCU stay  Dr. Shelton  discussed with patient's mother Ivette  about her therapist recommending her discharge to TCU as she is weaker than baseline.  She would like  to [facility] into [facility] or [facility]CU's and see how expensive they are going to be if her son also goes to TCU with her and decide about that     Recurrent care coordinator consulted and are following for safe discharge disposition and possible TCU placement versus going home      awaiting updates from  today    History of cerebral palsy  -Continue home medications when verified.     Cardiac, hypertension, hyperlipidemia  Continue home meds - Maxzide, norvasc, lipitor     History of anxiety, depression:   Mood appears stable  Continue PTA zoloft     History of GERD:   Continue daily PPI  Medical Decision Making       35 MINUTES SPENT BY ME on the date of service doing chart review, history, exam, documentation & further activities per the note.        PPE Worn:  Mask, gloves     Diet: Regular Diet Adult  Diet  Room Service    DVT Prophylaxis: Pneumatic Compression Devices  Vargas Catheter: Not  "present  Lines: None     Cardiac Monitoring: None  Code Status: Full Code      Clinically Significant Risk Factors Present on Admission                  # Hypertension: Noted on problem list      # Overweight: Estimated body mass index is 27.51 kg/m  as calculated from the following:    Height as of this encounter: 1.676 m (5' 6\").    Weight as of this encounter: 77.3 kg (170 lb 6.7 oz).            Disposition Plan     Expected Discharge Date: 08/03/2023, 12:00 PM  Discharge Delays: *Medically Ready for Discharge  Other (Add Comment)              Rita Ty DO  Hospitalist Service  [facility]  Securely message with [employer] (more info)  Text page via [employer] Paging/Directory   ______________________________________________________________________    Interval History   Doing well this am.  No HA, CP or SOB.  No N/V, F/C or other new complaints this am.  No nursing concerns this am.  \"How is my Mom doing?\"    Physical Exam   Vital Signs: Temp: 97.7  F (36.5  C) Temp src: Oral BP: (!) 143/92 Pulse: 92   Resp: 16 SpO2: 96 % O2 Device: None (Room air)    Weight: 170 lbs 6.65 oz    GEN:  Alert, oriented x 3, appears comfortable, no overt distress.  HEENT:  Normocephalic/atraumatic, no scleral icterus, no nasal discharge, mouth moist.  CV:  Regular rate and rhythm, no murmur or JVD.  S1 + S2 noted  LUNGS:  Clear to auscultation ant/lat bilaterally without rales/rhonchi/wheezing/retractions.  Symmetric chest rise on inhalation noted.  EXT:  SCD's in place bilaterally    Medications      amLODIPine  2.5 mg Oral Daily    baclofen  20 mg Oral 5x Daily    sertraline  200 mg Oral Daily    sodium chloride (PF)  3 mL Intracatheter Q8H    triamterene-HCTZ  1 tablet Oral Daily       Data     Labs and Imaging results below reviewed today.  Recent Labs   Lab 07/29/23  1737   WBC 10.3   HGB 17.0   HCT 52.1   MCV 89        Recent Labs   Lab 07/29/23  1737      POTASSIUM 3.8   CHLORIDE " 101   CO2 25   ANIONGAP 15   GLC 95   BUN 9.9   CR 0.76   GFRESTIMATED >90   ROBINA 9.5       No results found for this or any previous visit (from the past 24 hour(s)).

## 2023-08-03 NOTE — PROGRESS NOTES
diagnostic tests and consults completed and resulted: met  -vital signs normal or at patient baseline: met  -safe disposition plan has been identified: partially met

## 2023-08-03 NOTE — PROGRESS NOTES
Care Management Follow Up    Length of Stay (days): 0    Expected Discharge Date: 08/04/2023     Concerns to be Addressed:       Patient plan of care discussed at interdisciplinary rounds: Yes    Anticipated Discharge Disposition: Home    Referrals Placed by CM/SW:    Private pay costs discussed: Not applicable    Additional Information:  SW spoke to two accepting facilities for respite care.     Susan at [facility] ([phone]) reports that they have two private rooms available today/tomorrow for both patient and his mom. She said that they do not do shared rooms for people of the opposite sex and they typically make no exceptions for this. There would be a cost of $70 total a day for the separate, private rooms. This is not covered by insurance.    [facility] ([phone]) said that they can accept both patient and mom. She said that she could not guarantee they would be close to each other.     Patient's mother is declining two options. Patient would have insurance coverage for transportation home. Patient's mother concerned about the wheelchair transportation not having the correct wheelchair.     Amie Ferris, ROBERT, [facility]   Social Work   [facility]

## 2023-08-04 PROCEDURE — G0378 HOSPITAL OBSERVATION PER HR: HCPCS

## 2023-08-04 PROCEDURE — 250N000013 HC RX MED GY IP 250 OP 250 PS 637: Performed by: HOSPITALIST

## 2023-08-04 PROCEDURE — 250N000013 HC RX MED GY IP 250 OP 250 PS 637: Performed by: INTERNAL MEDICINE

## 2023-08-04 PROCEDURE — 99232 SBSQ HOSP IP/OBS MODERATE 35: CPT | Performed by: INTERNAL MEDICINE

## 2023-08-04 PROCEDURE — 99207 PR CDG-CUT & PASTE-POTENTIAL IMPACT ON LEVEL: CPT | Performed by: INTERNAL MEDICINE

## 2023-08-04 RX ORDER — AMLODIPINE BESYLATE 2.5 MG/1
2.5 TABLET ORAL DAILY
Qty: 90 TABLET | Refills: 3 | DISCHARGE
Start: 2023-08-04 | End: 2023-11-25

## 2023-08-04 RX ORDER — DIAZEPAM 5 MG
5 TABLET ORAL
Qty: 10 TABLET | Refills: 0 | Status: SHIPPED | DISCHARGE
Start: 2023-08-04 | End: 2023-09-29

## 2023-08-04 RX ORDER — BACLOFEN 20 MG/1
20 TABLET ORAL
Qty: 120 TABLET | Refills: 0 | DISCHARGE
Start: 2023-08-04 | End: 2023-11-25

## 2023-08-04 RX ADMIN — BACLOFEN 20 MG: 10 TABLET ORAL at 18:59

## 2023-08-04 RX ADMIN — TRIAMTERENE AND HYDROCHLOROTHIAZIDE 1 TABLET: 37.5; 25 TABLET ORAL at 09:04

## 2023-08-04 RX ADMIN — BACLOFEN 20 MG: 10 TABLET ORAL at 09:04

## 2023-08-04 RX ADMIN — AMLODIPINE BESYLATE 2.5 MG: 2.5 TABLET ORAL at 09:04

## 2023-08-04 RX ADMIN — BACLOFEN 20 MG: 10 TABLET ORAL at 15:48

## 2023-08-04 RX ADMIN — SENNOSIDES AND DOCUSATE SODIUM 2 TABLET: 50; 8.6 TABLET ORAL at 22:29

## 2023-08-04 RX ADMIN — BACLOFEN 20 MG: 10 TABLET ORAL at 22:23

## 2023-08-04 RX ADMIN — SERTRALINE HYDROCHLORIDE 200 MG: 100 TABLET ORAL at 09:04

## 2023-08-04 RX ADMIN — BACLOFEN 20 MG: 10 TABLET ORAL at 11:32

## 2023-08-04 RX ADMIN — POLYETHYLENE GLYCOL 3350 17 G: 17 POWDER, FOR SOLUTION ORAL at 09:12

## 2023-08-04 ASSESSMENT — ACTIVITIES OF DAILY LIVING (ADL)
ADLS_ACUITY_SCORE: 47
ADLS_ACUITY_SCORE: 46
ADLS_ACUITY_SCORE: 47
ADLS_ACUITY_SCORE: 46
ADLS_ACUITY_SCORE: 47
ADLS_ACUITY_SCORE: 47
ADLS_ACUITY_SCORE: 46
ADLS_ACUITY_SCORE: 47
ADLS_ACUITY_SCORE: 47
ADLS_ACUITY_SCORE: 46
ADLS_ACUITY_SCORE: 47
ADLS_ACUITY_SCORE: 47

## 2023-08-04 NOTE — PROGRESS NOTES
Care Management Follow Up    Length of Stay (days): 0    Expected Discharge Date: 08/04/2023     Concerns to be Addressed:       Patient plan of care discussed at interdisciplinary rounds: Yes    Anticipated Discharge Disposition: LTC vs home    Private pay costs discussed: Not applicable    Additional Information:  SW spoke to Kiera at [facility] [phone] several times throughout the day to facilitate discharge. Discharge orders and scripts were faxed manually, as they reported an inability to see it through [facility].  formerly [facility] transport was scheduled for patient.    SW met with patient in his room throughout the day to discuss discharge planning. Patient spoke very little with SW, only expressing that he did not want to go to [facility] because he did not think the care would be good enough. He expressed a desire to return home, but would not be able to be at home without his mother/primary caregiver. SW informed him that he was not able to appeal his discharge to a TCU, because he is observation status.    ADD: 1630  SW received call from Kiera at [facility] saying they are making a decision that the patient cannot come today because of the inconsistent planning and the continued unknown plan. She said discharge must be pushed back until tomorrow at 1300. Hospitalist discontinued discharge orders for today.     ADD: 1720  Transport rescheduled for tomorrow. [facility] will  between [phone] and bring to [facility] as this is the only accepting facility. PCS, PAS not completed.    Amie Ferris, MSW, LGSW   Social Work   [facility]

## 2023-08-04 NOTE — PROGRESS NOTES
Patient is AO X 4; VSS; on RA; he is in no acute distress.  Case management is working on placement.   Miralax was administered X 1 for constipation.

## 2023-08-04 NOTE — PROGRESS NOTES
[facility]    Medicine Progress Note - Hospitalist Service    Date of Admission:  7/29/2023    Assessment & Plan     Ambrocio Delvalle is a 63 year old male admitted on 7/29/2023.      Vulnerable adult, history of cerebral palsy: Patient presenting to the emergency department after his mother had to present to the emergency department for concern of stroke, patient with no caregiver at home while his mother is being admitted for stroke evaluation.    His mother is admitted at [facility] with medical issues  and will need to coordinate his discharge with her discharge as she is his primary caregiver       Family care conference was held with his mother Ivtete  and patient Bola , Care coordinator and Ivette's physician hospitalist and Dr Farmer on On 8/2/2023. Both patient and his mother expressed previous bad experience with his TCU stay  Dr. Shelton  discussed with patient's mother Ivette  about her therapist recommending her discharge to TCU as she is weaker than baseline.  She would like  to look into [facility] or TCU's and see how expensive they are going to be if her son also goes to TCU with her and decide about that   care coordinator consulted and are following for safe discharge disposition and possible TCU placement versus going home      awaiting updates from  today    History of cerebral palsy  -Continue home medications when verified.     Cardiac, hypertension, hyperlipidemia  Continue home meds - Maxzide, norvasc, lipitor     History of anxiety, depression:   Mood appears stable  Continue PTA zoloft     History of GERD:   Continue daily PPI  Medical Decision Making       35 MINUTES SPENT BY ME on the date of service doing chart review, history, exam, documentation & further activities per the note.             Diet: Regular Diet Adult  Diet  Room Service    DVT Prophylaxis: Pneumatic Compression Devices  Vargas Catheter: Not present  Lines: None     Cardiac  "Monitoring: None  Code Status: Full Code      Clinically Significant Risk Factors Present on Admission                    # Hypertension: Noted on problem list      # Overweight: Estimated body mass index is 27.51 kg/m  as calculated from the following:    Height as of this encounter: 1.676 m (5' 6\").    Weight as of this encounter: 77.3 kg (170 lb 6.7 oz).              Disposition Plan      Expected Discharge Date: 08/04/2023, 12:00 PM  Discharge Delays: *Medically Ready for Discharge  Other (Add Comment)              Ana Farmer MD  Hospitalist Service  [facility]  Securely message with [employer] (more info)  Text page via [email or web address] Paging/Directory   ______________________________________________________________________    Interval History   Doing well this am.  No HA, CP or SOB.  No N/V, F/C or other new complaints this am.  No nursing concerns this am.      Physical Exam   Vital Signs: Temp: 97.7  F (36.5  C) Temp src: Oral BP: (!) 158/87 Pulse: 82   Resp: 18 SpO2: 99 % O2 Device: None (Room air)    Weight: 170 lbs 6.65 oz    GEN:  Alert, oriented x 3, appears comfortable, no overt distress.  HEENT:  Normocephalic/atraumatic, no scleral icterus, no nasal discharge, mouth moist.  CV:  Regular rate and rhythm, no murmur or JVD.  S1 + S2 noted  LUNGS:  Clear to auscultation ant/lat bilaterally without rales/rhonchi/wheezing/retractions.  Symmetric chest rise on inhalation noted.  EXT:  SCD's in place bilaterally    Medications      amLODIPine  2.5 mg Oral Daily    baclofen  20 mg Oral 5x Daily    sertraline  200 mg Oral Daily    sodium chloride (PF)  3 mL Intracatheter Q8H    triamterene-HCTZ  1 tablet Oral Daily       Data     Labs and Imaging results below reviewed today.  Recent Labs   Lab 07/29/23  1737   WBC 10.3   HGB 17.0   HCT 52.1   MCV 89          Recent Labs   Lab 07/29/23  1737      POTASSIUM 3.8   CHLORIDE 101   CO2 25   ANIONGAP 15   GLC 95   BUN 9.9   CR 0.76 "   GFRESTIMATED >90   ROBINA 9.5         No results found for this or any previous visit (from the past 24 hour(s)).

## 2023-08-04 NOTE — PLAN OF CARE
Date/time: 8/3 night shift 1900-0730  Summary: ad 7/29 of vulnerable adult. Hx of cerebral palsy  Orientation: A&OX4    Vitals/Tele: VSS on RA except elevated BP ( SBP 140s). No tele    IV Access/drains: PIV SL    Diet: Regular diet. thin liquids, takes pills whole  Mobility: AX2 with lift    GI/: continent BB. Pt reported constipated- Laxative PRN given.  uses urinal in bed.    Wound/Skin: bruise L shin    Consults: awaiting for placement    Discharge Plan: TBD   See Flow sheets for assessment    Patient presenting to the emergency department after his mother had to present to the emergency department for concern of stroke, patient with no caregiver at home while his mother is being admitted for stroke evaluation.     His mother is admitted at [facility] with medical issues  and will need to coordinate his discharge with her discharge as she is his primary caregiver

## 2023-08-05 PROCEDURE — 99231 SBSQ HOSP IP/OBS SF/LOW 25: CPT | Performed by: INTERNAL MEDICINE

## 2023-08-05 PROCEDURE — G0378 HOSPITAL OBSERVATION PER HR: HCPCS

## 2023-08-05 PROCEDURE — 250N000013 HC RX MED GY IP 250 OP 250 PS 637: Performed by: HOSPITALIST

## 2023-08-05 PROCEDURE — 250N000013 HC RX MED GY IP 250 OP 250 PS 637: Performed by: INTERNAL MEDICINE

## 2023-08-05 RX ORDER — ATORVASTATIN CALCIUM 20 MG
TABLET ORAL
Qty: 90 TABLET | Refills: 3 | DISCHARGE
Start: 2023-08-05 | End: 2023-11-25

## 2023-08-05 RX ORDER — AMLODIPINE BESYLATE 5 MG/1
5 TABLET ORAL DAILY
Status: DISCONTINUED | OUTPATIENT
Start: 2023-08-05 | End: 2023-08-08 | Stop reason: HOSPADM

## 2023-08-05 RX ADMIN — SERTRALINE HYDROCHLORIDE 200 MG: 100 TABLET ORAL at 08:28

## 2023-08-05 RX ADMIN — BACLOFEN 20 MG: 10 TABLET ORAL at 14:24

## 2023-08-05 RX ADMIN — BACLOFEN 20 MG: 10 TABLET ORAL at 11:04

## 2023-08-05 RX ADMIN — AMLODIPINE BESYLATE 5 MG: 5 TABLET ORAL at 15:55

## 2023-08-05 RX ADMIN — AMLODIPINE BESYLATE 2.5 MG: 2.5 TABLET ORAL at 08:28

## 2023-08-05 RX ADMIN — BACLOFEN 20 MG: 10 TABLET ORAL at 18:42

## 2023-08-05 RX ADMIN — TRIAMTERENE AND HYDROCHLOROTHIAZIDE 1 TABLET: 37.5; 25 TABLET ORAL at 08:28

## 2023-08-05 RX ADMIN — POLYETHYLENE GLYCOL 3350 17 G: 17 POWDER, FOR SOLUTION ORAL at 08:50

## 2023-08-05 RX ADMIN — BACLOFEN 20 MG: 10 TABLET ORAL at 08:28

## 2023-08-05 RX ADMIN — BACLOFEN 20 MG: 10 TABLET ORAL at 21:20

## 2023-08-05 ASSESSMENT — ACTIVITIES OF DAILY LIVING (ADL)
ADLS_ACUITY_SCORE: 48
ADLS_ACUITY_SCORE: 45
ADLS_ACUITY_SCORE: 48
ADLS_ACUITY_SCORE: 45
ADLS_ACUITY_SCORE: 48
ADLS_ACUITY_SCORE: 48
ADLS_ACUITY_SCORE: 45
ADLS_ACUITY_SCORE: 48
ADLS_ACUITY_SCORE: 48

## 2023-08-05 NOTE — PROGRESS NOTES
[facility]    Medicine Progress Note - Hospitalist Service    Date of Admission:  7/29/2023    Assessment & Plan   Ambrocio Delvalle is a 63 year old male admitted on 7/29/2023.      Vulnerable adult, history of cerebral palsy: Patient presenting to the emergency department after his mother had to present to the emergency department for concern of stroke, patient with no caregiver at home while his mother is being admitted for stroke evaluation.    His mother is admitted at [facility] with medical issues and will need to coordinate his discharge with her discharge as she is his primary caregiver       Family care conference was held with his mother Ivette  and patient Bola , Care coordinator and Ivette's physician hospitalist and Dr Farmer on On 8/2/2023. Both patient and his mother expressed previous bad experience with his TCU stay  Dr. Shelton  discussed with patient's mother Ivette  about her therapist recommending her discharge to TCU as she is weaker than baseline.  She would like  to [facility] into [facility] or [facility]CU's and see how expensive they are going to be if her son also goes to TCU with her and decide about that. Care coordinator consulted and are following for safe discharge disposition and possible TCU placement versus going home     Not getting discharged today as mother is appealing her discharge to TCU    History of cerebral palsy  -Continue home medications when verified.     Cardiac, hypertension, hyperlipidemia  - Continue home meds   - Maxzide, norvasc, lipitor  BP elevated today   Increase amlodipine to 5 mg daily and todays dose will be 7.5.     History of anxiety, depression:   Mood appears stable  Continue PTA zoloft     History of GERD:   Continue daily PPI       Diet: Regular Diet Adult  Diet  Room Service    DVT Prophylaxis: Pneumatic Compression Devices  Vargas Catheter: Not present  Lines: None     Cardiac Monitoring: None  Code Status: Full Code   "    Clinically Significant Risk Factors Present on Admission                  # Hypertension: Noted on problem list      # Overweight: Estimated body mass index is 27.51 kg/m  as calculated from the following:    Height as of this encounter: 1.676 m (5' 6\").    Weight as of this encounter: 77.3 kg (170 lb 6.7 oz).            Disposition Plan      Expected Discharge Date: 08/05/2023,  3:00 PM  Discharge Delays: *Medically Ready for Discharge  Other (Add Comment)              Zara Dempsey MD  Hospitalist Service  [facility]  Securely message with [employer] (more info)  Text page via [employer] Paging/Directory   ______________________________________________________________________    Interval History   Patient seen and evaluated this am.   He is doing well      Physical Exam   Vital Signs: Temp: 98  F (36.7  C) Temp src: Oral BP: (!) 158/105 Pulse: 97   Resp: 16 SpO2: 94 % O2 Device: None (Room air)    Weight: 170 lbs 6.65 oz    General Appearance: stable  Respiratory: lungs are clear on exam  Cardiovascular: heart sounds are normal  GI: stable  Skin: stable  Other: stable       30 MINUTES SPENT BY ME on the date of service doing chart review, history, exam, documentation & further activities per the note.        "

## 2023-08-05 NOTE — CARE PLAN
Pt's discharge was cancelled again due to concerns with the discharge plan.  Patient will stay tonight.  Patient has been stable, visiting with his mom whom has been at the bedside.  Denying pain and vitally stable.  No further concerns at this time.

## 2023-08-05 NOTE — CARE PLAN
-diagnostic tests and consults completed and resulted-met  -vital signs normal or at patient baseline-met  -safe disposition plan has been identified-met     Kirsten Ulrich, RN

## 2023-08-05 NOTE — PLAN OF CARE
Goal Outcome Evaluation:      Plan of Care Reviewed With: patient    Overall Patient Progress: no change    Pt here as vulnerable adult with history of cerebral palsy: Patient presenting to the emergency department after his mother had to present to the emergency department for concern of stroke, patient with no caregiver at home while his mother is being admitted for stroke evaluation.   Orientation/Cognitive: A&OX4  Observation Goals (Met/ Not Met): Met  Mobility Level/Assist Equipment: AX2, lift, w/c bound baseline  Fall Risk (Y/N): Y  Behavior Concerns: None  Pain Management: Denies any pain PRN Tylenol available  Tele/VS/O2: Elevated B.P, other VSS on RA  ABNL Lab/BG: None this shift  Diet: Regular diet, room service,takes pills whole with apple sauce  Bowel/Bladder: incontinent at times, using urinal in bed, voiding o.k  Skin Concerns: scattered bruising  Drains/Devices: PIV is s/l  Tests/Procedures for next shift: None  Anticipated DC date & active delays: Accepted at [facility] TCU for today, [facility] stretcher ride scheduled between 4483-3005

## 2023-08-05 NOTE — PROGRESS NOTES
Care Management Discharge Note    Discharge Date: 08/05/2023       Discharge Disposition: [facility]    Discharge Services:      Discharge DME:      Discharge Transportation:      Private pay costs discussed: Not applicable    Does the patient's insurance plan have a 3 day qualifying hospital stay waiver?  Yes   Will the waiver be used for post-acute placement? No    PAS Confirmation Code: [id number]  Patient/family educated on Medicare website which has current facility and service quality ratings:      Education Provided on the Discharge Plan:    Persons Notified of Discharge Plans: Patient/mother  Patient/Family in Agreement with the Plan:      Handoff Referral Completed: Yes    Additional Information:  SW received discharge orders and paper scripts and faxed to facility. SW completed PAS and faxed/added to chart. SW spoke with patient/mother and discussed how the doctors have discharged patient and since he is observation status he is unable to appeal this discharge. Patients mother is indicating she does not want patient to go to [facility]. Patients mother is also a patient at the hospital and is inpatient and has the right to appeal her discharge. SW explained the differences between inpatient and OBS and explained that if patient were to not discharge they may be responsible for the costs incurred during hospital stay. SW explained transport is scheduled for 5713-6236 and if patient does not discharge at this time he would be given a notice of non coverage and may be responsible for costs accrued. Patient/mother requested to speak with ANS to file a complaint and to get ANS's recommendations.    Addendum 1400: SW informed patient is refusing discharge with plans to discharge home with his mother tomorrow. SW asked patient if he would prefer to go to [facility] as mother likely can't care for his needs at this time. Patient indicated he would like to return home with his mother.   Registration contacted to begin the ABN  process. SW rescheduled stretcher transport for tomorrow 08/06 at [id number]. SW updated patient on transport time. ROEL called [facility] and informed them that patient will not be coming.     TOMEKA Weiss    [facility]

## 2023-08-05 NOTE — PLAN OF CARE
Goal Outcome Evaluation:       Pt here as a vulnerable adult admitted to observation for cerebral palsy (his mom was admitted for stroke symptoms same day). A&O x4. Neuros baseline cerebral palsy deficits. VSS, ex. HTN. Not on tele. reg diet, thin liquids. Takes pills whole with water. Up with A2 lift (baseline wheelchair bound at home). Denies pain. Prune juice and Senna given for constipation with no results yet. Pt scoring green on the Aggression Stop Light Tool. Plan to  discharge to [facility] Tomorrow between 1340 and 1430.

## 2023-08-06 PROCEDURE — 250N000013 HC RX MED GY IP 250 OP 250 PS 637: Performed by: INTERNAL MEDICINE

## 2023-08-06 PROCEDURE — G0378 HOSPITAL OBSERVATION PER HR: HCPCS

## 2023-08-06 PROCEDURE — 250N000013 HC RX MED GY IP 250 OP 250 PS 637: Performed by: HOSPITALIST

## 2023-08-06 PROCEDURE — 99231 SBSQ HOSP IP/OBS SF/LOW 25: CPT | Performed by: INTERNAL MEDICINE

## 2023-08-06 RX ORDER — FLUTICASONE PROPIONATE 50 MCG
1 SPRAY, SUSPENSION (ML) NASAL DAILY
Status: DISCONTINUED | OUTPATIENT
Start: 2023-08-06 | End: 2023-08-08 | Stop reason: HOSPADM

## 2023-08-06 RX ADMIN — SERTRALINE HYDROCHLORIDE 200 MG: 100 TABLET ORAL at 08:17

## 2023-08-06 RX ADMIN — BISACODYL 10 MG: 10 SUPPOSITORY RECTAL at 22:09

## 2023-08-06 RX ADMIN — FLUTICASONE PROPIONATE 1 SPRAY: 50 SPRAY, METERED NASAL at 22:51

## 2023-08-06 RX ADMIN — AMLODIPINE BESYLATE 5 MG: 5 TABLET ORAL at 08:27

## 2023-08-06 RX ADMIN — SENNOSIDES AND DOCUSATE SODIUM 2 TABLET: 50; 8.6 TABLET ORAL at 16:31

## 2023-08-06 RX ADMIN — BACLOFEN 20 MG: 10 TABLET ORAL at 11:37

## 2023-08-06 RX ADMIN — BACLOFEN 20 MG: 10 TABLET ORAL at 13:38

## 2023-08-06 RX ADMIN — BACLOFEN 20 MG: 10 TABLET ORAL at 22:09

## 2023-08-06 RX ADMIN — TRIAMTERENE AND HYDROCHLOROTHIAZIDE 1 TABLET: 37.5; 25 TABLET ORAL at 08:17

## 2023-08-06 RX ADMIN — BACLOFEN 20 MG: 10 TABLET ORAL at 18:37

## 2023-08-06 RX ADMIN — BACLOFEN 20 MG: 10 TABLET ORAL at 08:17

## 2023-08-06 ASSESSMENT — ACTIVITIES OF DAILY LIVING (ADL)
ADLS_ACUITY_SCORE: 48
ADLS_ACUITY_SCORE: 51
ADLS_ACUITY_SCORE: 48

## 2023-08-06 NOTE — PLAN OF CARE
Goal Outcome Evaluation:       Pt here as a vulnerable adult admitted to observation for cerebral palsy (his mom was admitted for stroke symptoms same day). A&O x4. Neuros baseline cerebral palsy deficits. VSS, ex. HTN. Not on tele. reg diet, thin liquids. Takes pills whole with water. Up with A2 lift (baseline wheelchair bound at home). Denies pain. No BM today. Pt scoring green on the Aggression Stop Light Tool. Plan to  discharge home at the same time with his mom tomorrow between 1340 and 1430.

## 2023-08-06 NOTE — PLAN OF CARE
Goal Outcome Evaluation:       A&Ox4. VSS on room air. Up Ao2 with lift. Denies pain. Discharge was planned for today but canceled due to mother and pt's refusal. Discharge pending, SW/CC following.

## 2023-08-06 NOTE — PROGRESS NOTES
Care Management Follow Up    Length of Stay (days): 0    Expected Discharge Date: 08/06/2023     Concerns to be Addressed:       Patient plan of care discussed at interdisciplinary rounds: Yes    Anticipated Discharge Disposition: Home     Anticipated Discharge Services:    Anticipated Discharge DME:      Patient/family educated on Medicare website which has current facility and service quality ratings:    Education Provided on the Discharge Plan:    Patient/Family in Agreement with the Plan:      Referrals Placed by CM/SW:    Private pay costs discussed: Not applicable    Additional Information:  SW spoke with patient/mother to discuss discharge plans as patient and mother planned discharge home today. SW informed they are both refusing to discharge today. SW cancelled  stretcher transport.    TOMEKA Weiss    [facility]

## 2023-08-06 NOTE — PROGRESS NOTES
[facility]    Medicine Progress Note - Hospitalist Service    Date of Admission:  7/29/2023    Assessment & Plan   Ambrocio Delvalle is a 63 year old male admitted on 7/29/2023.      Vulnerable adult, history of cerebral palsy: Patient presenting to the emergency department after his mother had to present to the emergency department for concern of stroke, patient with no caregiver at home while his mother is being admitted for stroke evaluation.    His mother is admitted at [facility] with medical issues and will need to coordinate his discharge with her discharge as she is his primary caregiver       Family care conference was held with his mother Ivette  and patient Bola , Care coordinator and Ivette's physician hospitalist and Dr Framer on On 8/2/2023. Both patient and his mother expressed previous bad experience with his TCU stay  Dr. Shelton  discussed with patient's mother Ivette  about her therapist recommending her discharge to TCU as she is weaker than baseline.  She would like  to look into [facility] or TCU's and see how expensive they are going to be if her son also goes to TCU with her and decide about that. Care coordinator consulted and are following for safe discharge disposition and possible TCU placement versus going home     SW spoke with patient/mother to discuss discharge plans as patient and mother planned discharge home today. SW informed they are both refusing to discharge today.    History of cerebral palsy  -Continue home medications     Cardiac, hypertension, hyperlipidemia  - Continue home meds   - Maxzide, norvasc, lipitor  -Increase amlodipine to 5 mg daily      History of anxiety, depression:   Mood appears stable  Continue PTA zoloft     History of GERD:   Continue daily PPI       Diet: Regular Diet Adult  Diet  Room Service    DVT Prophylaxis: Pneumatic Compression Devices  Vargas Catheter: Not present  Lines: None     Cardiac Monitoring: None  Code  "Status: Full Code      Clinically Significant Risk Factors Present on Admission                  # Hypertension: Noted on problem list      # Overweight: Estimated body mass index is 27.51 kg/m  as calculated from the following:    Height as of this encounter: 1.676 m (5' 6\").    Weight as of this encounter: 77.3 kg (170 lb 6.7 oz).            Disposition Plan      Expected Discharge Date: 08/06/2023,  9:00 AM  Discharge Delays: *Medically Ready for Discharge  Other (Add Comment)              Zara Dempsey MD  Hospitalist Service  [facility]  Securely message with [employer] (more info)  Text page via [employer] Paging/Directory   ______________________________________________________________________    Interval History       Physical Exam   Vital Signs: Temp: 97.6  F (36.4  C) Temp src: Oral BP: (!) 158/86 Pulse: 85   Resp: 16 SpO2: 94 % O2 Device: None (Room air)    Weight: 170 lbs 6.65 oz    General Appearance: unremarkable  Respiratory: lungs are clear   Cardiovascular: heart sounds are normal  GI: unremarkable  Skin: unremarkable  Other: unreamrkable       30 MINUTES SPENT BY ME on the date of service doing chart review, history, exam, documentation & further activities per the note.      "

## 2023-08-06 NOTE — PROGRESS NOTES
Goal Outcome Evaluation:    Pt here as vulnerable adult with history of cerebral palsy: Patient presenting to the emergency department after his mother had to present to the emergency department for concern of stroke, patient with no caregiver at home while his mother is being admitted for stroke evaluation.      DATE & TIME: 08/05/2023 8047-8630    Cognitive Concerns/ Orientation : A/Ox4 slow speech    BEHAVIOR & AGGRESSION TOOL COLOR: green   ABNL VS/O2: VSS on room air   MOBILITY: Assist 2, weight shifting, wheelchair bound, Lift   PAIN MANAGMENT: NA   DIET: Reg   BOWEL/BLADDER: incontinent   ABNL LAB/BG: None on this shift   DRAIN/DEVICES: RPIV  TELEMETRY RHYTHM: NA   SKIN: Blanchable redness saccum, warm/ clammy   TESTS/PROCEDURES: none   D/C DATE: 08/06/2023 Home with mom   Discharge Barriers: None

## 2023-08-06 NOTE — PROGRESS NOTES
Observation goals  PRIOR TO DISCHARGE        Comments: -diagnostic tests and consults completed and resulted  -vital signs normal or at patient baseline-met  -safe disposition plan has been identified-met  Nurse to notify provider when observation goals have been met and patient is ready for discharge

## 2023-08-07 PROCEDURE — G0378 HOSPITAL OBSERVATION PER HR: HCPCS

## 2023-08-07 PROCEDURE — 250N000013 HC RX MED GY IP 250 OP 250 PS 637: Performed by: INTERNAL MEDICINE

## 2023-08-07 PROCEDURE — 250N000013 HC RX MED GY IP 250 OP 250 PS 637: Performed by: HOSPITALIST

## 2023-08-07 PROCEDURE — 99232 SBSQ HOSP IP/OBS MODERATE 35: CPT | Performed by: INTERNAL MEDICINE

## 2023-08-07 RX ADMIN — BACLOFEN 20 MG: 10 TABLET ORAL at 17:55

## 2023-08-07 RX ADMIN — BACLOFEN 20 MG: 10 TABLET ORAL at 22:19

## 2023-08-07 RX ADMIN — SENNOSIDES AND DOCUSATE SODIUM 2 TABLET: 50; 8.6 TABLET ORAL at 20:26

## 2023-08-07 RX ADMIN — BACLOFEN 20 MG: 10 TABLET ORAL at 07:31

## 2023-08-07 RX ADMIN — BACLOFEN 20 MG: 10 TABLET ORAL at 14:22

## 2023-08-07 RX ADMIN — SERTRALINE HYDROCHLORIDE 200 MG: 100 TABLET ORAL at 07:31

## 2023-08-07 RX ADMIN — AMLODIPINE BESYLATE 5 MG: 5 TABLET ORAL at 07:31

## 2023-08-07 RX ADMIN — DIAZEPAM 5 MG: 5 TABLET ORAL at 20:43

## 2023-08-07 RX ADMIN — BACLOFEN 20 MG: 10 TABLET ORAL at 11:50

## 2023-08-07 RX ADMIN — TRIAMTERENE AND HYDROCHLOROTHIAZIDE 1 TABLET: 37.5; 25 TABLET ORAL at 07:31

## 2023-08-07 RX ADMIN — FLUTICASONE PROPIONATE 1 SPRAY: 50 SPRAY, METERED NASAL at 07:31

## 2023-08-07 ASSESSMENT — ACTIVITIES OF DAILY LIVING (ADL)
ADLS_ACUITY_SCORE: 51

## 2023-08-07 NOTE — PLAN OF CARE
Reason for Admission: [facility]ite, [facility]      Cognitive/Mentation: A/Ox 4  Neuros/CMS: Intact ex   VS: stable.  GI: BS present, passing flatus. Continent.  : incontinent.  Pulmonary: LS clear.  Pain: denies pain.     Drains/Lines: PIV patent and intact  Skin: CDI  Activity: Assist x two with lift.  Diet: reg with thin liquids. Takes pills whole.     Therapies recs: pending  Discharge: discharge to home pending    Aggression Stoplight Tool: green

## 2023-08-07 NOTE — PROGRESS NOTES
"SPIRITUAL HEALTH SERVICES Progress Note  [facility]  73    Saw pt Ambrocio Delvalle per length of stay. Mother (who is also a patient on unit) present at bedside.    Patient/Family Understanding of Illness and Goals of Care - José Miguel did not talk about why he is in hospital apart from indicating that it is somehow connected to his mother's need for hospitalization. José Miguel indicates that he expects to discharge to home tomorrow.    Distress and Loss - José Miguel described the TCU he previously discharged to after his last hospitalization in very negative terms, adding that he prefers to go home this time. His mother agreed with this assessment and described the TCU as \"substandard\".    Strengths, Coping, and Resources - José Miguel talked about the things he enjoys while home which include reading and watching television. 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 José Miguel disclosed that he was in the process of completing his doctoral thesis in English literature but was unable to complete his PhD due to his medical condition.    Meaning, Beliefs, and Spirituality - José Miguel is dev[employer]ly [employer] and seriously considered becoming a  after being encouraged in this by his professors at [employer].    Plan of Care - Placed non-emergent request for visit by  with the permission of the patient. No additional follow-up planned as pt expects to discharge in the next 24 hours.    Ezekiel Martin  [facility] Phone Line [phone]  Spiritual Health Pager [phone]  "

## 2023-08-07 NOTE — PROGRESS NOTES
Observation goals  PRIOR TO DISCHARGE        Comments: -diagnostic tests and consults completed and resulted: YES  -vital signs normal or at patient baseline; YES  -safe disposition plan has been identified; YES  Nurse to notify provider when observation goals have been met and patient is ready for discharge

## 2023-08-07 NOTE — PROGRESS NOTES
Care Management Follow Up    Length of Stay (days): 0    Expected Discharge Date: 08/08/2023     Concerns to be Addressed:       Patient plan of care discussed at interdisciplinary rounds: Yes    Anticipated Discharge Disposition: Home     Anticipated Discharge Services:    Anticipated Discharge DME:      Patient/family educated on Medicare website which has current facility and service quality ratings:    Education Provided on the Discharge Plan:    Patient/Family in Agreement with the Plan:      Referrals Placed by CM/SW:    Private pay costs discussed: Not applicable    Additional Information:  SW spoke with patient and again offered respite care and patient stated he would like to discharge home with his mother. This is not recommended as patients mother is likely not able to care for patients current needs. SW will likely need to file a [facility] report if patient discharges home. ROEL scheduled  stretcher transport for tomorrow 08/08 to home at [phone].    TOMEKA Weiss    [facility]

## 2023-08-07 NOTE — PLAN OF CARE
Goal Outcome Evaluation:        Pt here as a vulnerable adult admitted to observation for cerebral palsy (his mom was admitted for stroke symptoms same day). A&O x4. Neuros baseline cerebral palsy deficits. VSS, ex. HTN. Not on tele. reg diet, thin liquids. Takes pills whole with water. Up with A2 lift (baseline wheelchair bound at home). Denies pain. No BM today, 2 senna S. With prune juice given with no results, Dulcolax sup. given at 2215, on call hospitalist notified, new orders for Flonase per pt request. Pt scoring green on the Aggression Stop Light Tool. Discharge pending, see SW note.     Addendum: Dulcolax sup. Not much effective, pt tried using bedpan with not much results, stool is close to come out, he had a smear.

## 2023-08-07 NOTE — PROGRESS NOTES
Observation goals  PRIOR TO DISCHARGE        Comments: -diagnostic tests and consults completed and resulted  - Vital signs normal or at patient baseline- met  - Safe disposition plan has been identified- met  - Nurse to notify provider when observation goals have been met:  Patient is ready for discharge

## 2023-08-07 NOTE — PROGRESS NOTES
[facility]    Hospitalist Progress Note    Assessment & Plan   Ambrocio Delvalle is a 63 year old male admitted on 7/29/2023.      Vulnerable adult, history of cerebral palsy  Patient presenting to the emergency department after his mother had to present to the emergency department for concern of stroke, patient with no caregiver at home while his mother is being admitted for stroke evaluation.     His mother is admitted at [facility] with medical issues and will need to coordinate his discharge with her discharge as she is his primary caregiver       Family care conference was held with his mother Ivette  and patient Bola , Care coordinator and Ivette's physician hospitalist and Dr Farmer on On 8/2/2023. Both patient and his mother expressed previous bad experience with his TCU stay  Patient's mother was discharged to TCU and she has appealed the discharge, waiting to hear about the appeal, I am not sure why the patient is still in the hospital, there was a safe discharge plan on Friday,, currently discharge plan depending on social work input.  Patient is medically stable for discharge anytime.     History of cerebral palsy  -Continue home medications     Cardiac, hypertension, hyperlipidemia  - Continue home meds   - Maxzide, norvasc, lipitor  -Increase amlodipine to 5 mg daily      History of anxiety, depression:   Mood appears stable  Continue PTA zoloft     History of GERD:   Continue daily PPI      DVT Prophylaxis: Ambulate every shift  Code Status: Full Code     40 MINUTES SPENT BY ME on the date of service doing chart review, history, exam, documentation & further activities per the note.  Disposition: Expected discharge when discharge plans are formulated by social work.  Clinically Significant Risk Factors Present on Admission                  # Hypertension: Noted on problem list      # Overweight: Estimated body mass index is 27.51 kg/m  as calculated from the following:    Height  "as of this encounter: 1.676 m (5' 6\").    Weight as of this encounter: 77.3 kg (170 lb 6.7 oz).            Odalys Hull MD  Text Page   (7am to 6pm)    Interval History   Patient denies any new concerns, patient is here for placement.    -Data reviewed today: I reviewed all new labs and imaging results over the last 24 hours.     Physical Exam     Vital Signs with Ranges  Temp:  [98  F (36.7  C)-98.3  F (36.8  C)] 98  F (36.7  C)  Pulse:  [] 87  Resp:  [14-18] 18  BP: (137-160)/(84-90) 160/90  SpO2:  [94 %-98 %] 98 %  No intake/output data recorded.    Constitutional: Awake, alert, cooperative, no apparent distress  Respiratory: Clear to auscultation bilaterally, no crackles or wheezing  Cardiovascular: Regular rate and rhythm, normal S1 and S2, and no murmur noted  GI: Normal bowel sounds,    Medications      amLODIPine  5 mg Oral Daily    baclofen  20 mg Oral 5x Daily    fluticasone  1 spray Both Nostrils Daily    sertraline  200 mg Oral Daily    sodium chloride (PF)  3 mL Intracatheter Q8H    triamterene-HCTZ  1 tablet Oral Daily       Data   No lab results found in last 7 days.  No results for input(s): GLC, BGM in the last 168 hours.    Imaging:   No results found for this or any previous visit (from the past 24 hour(s)).  "

## 2023-08-07 NOTE — PLAN OF CARE
Goal Outcome Evaluation:  [id number]       Pt. A &O x4, slow speech. Vulnerable adult. Neuro`s baseline cerebral palsy. VSS on RA. Clear LS. Active BS, had smear amount incontinent stools. Adequately voiding via urinal. Regular diet, thin liquids. Takes pills whole. Ax2 w/ lift. Assisted with turn/repo. Plan- discharge home pending.

## 2023-08-08 ENCOUNTER — TELEPHONE (OUTPATIENT)
Dept: FAMILY MEDICINE | Facility: CLINIC | Age: 63
End: 2023-08-08
Payer: MEDICARE

## 2023-08-08 VITALS
WEIGHT: 170.42 LBS | HEIGHT: 66 IN | BODY MASS INDEX: 27.39 KG/M2 | SYSTOLIC BLOOD PRESSURE: 153 MMHG | DIASTOLIC BLOOD PRESSURE: 93 MMHG | TEMPERATURE: 98.2 F | HEART RATE: 78 BPM | RESPIRATION RATE: 16 BRPM | OXYGEN SATURATION: 97 %

## 2023-08-08 PROCEDURE — 250N000013 HC RX MED GY IP 250 OP 250 PS 637: Performed by: INTERNAL MEDICINE

## 2023-08-08 PROCEDURE — G0378 HOSPITAL OBSERVATION PER HR: HCPCS

## 2023-08-08 PROCEDURE — 99231 SBSQ HOSP IP/OBS SF/LOW 25: CPT | Performed by: INTERNAL MEDICINE

## 2023-08-08 PROCEDURE — 250N000013 HC RX MED GY IP 250 OP 250 PS 637: Performed by: HOSPITALIST

## 2023-08-08 RX ADMIN — BACLOFEN 20 MG: 10 TABLET ORAL at 14:25

## 2023-08-08 RX ADMIN — TRIAMTERENE AND HYDROCHLOROTHIAZIDE 1 TABLET: 37.5; 25 TABLET ORAL at 11:36

## 2023-08-08 RX ADMIN — AMLODIPINE BESYLATE 5 MG: 5 TABLET ORAL at 11:35

## 2023-08-08 RX ADMIN — SERTRALINE HYDROCHLORIDE 200 MG: 100 TABLET ORAL at 11:36

## 2023-08-08 RX ADMIN — BACLOFEN 20 MG: 10 TABLET ORAL at 11:35

## 2023-08-08 ASSESSMENT — ACTIVITIES OF DAILY LIVING (ADL)
ADLS_ACUITY_SCORE: 51

## 2023-08-08 NOTE — PLAN OF CARE
Summary: ad 7/29 of vulnerable adult. Hx of cerebral palsy    Orientation: A&OX4    Vitals/Tele: VSS on RA except elevated BP ( SBP 140s). No tele    IV Access/drains: PIV SL    Diet: Regular diet. thin liquids, takes pills whole    Mobility: AX2 with lift    GI/: continent BB. Pt reported constipated- Laxative PRN given.  uses urinal in bed.    Wound/Skin: bruise L shin    Consults: awaiting for placement    Discharge Plan: TBD

## 2023-08-08 NOTE — PROGRESS NOTES
Care Management Discharge Note    Discharge Date: 08/08/2023       Discharge Disposition: Home    Discharge Services: Transportation Services    Discharge DME: None    Discharge Transportation:      Private pay costs discussed: Not applicable    Does the patient's insurance plan have a 3 day qualifying hospital stay waiver?  No    PAS Confirmation Code: Not needed  Patient/family educated on Medicare website which has current facility and service quality ratings: yes    Education Provided on the Discharge Plan:  yes   Persons Notified of Discharge Plans: patient, bedside RN  Patient/Family in Agreement with the Plan: yes    Handoff Referral Completed: No    Additional Information:   rescheduled transport for today in order to allow patient and his mother to discharge at the same time. Stretcher transport scheduled between 5600-8157. PCS completed and faxed.     ROBERT Prather, LGSW   Social Work   [employer]
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
## 2023-08-08 NOTE — PLAN OF CARE
Goal Outcome Evaluation:    Reason for Admission: respite, [facility]       Cognitive/Mentation: A/Ox 4  Neuros/CMS: Baseline cerebral palsy, slow speech   VS: VSS on RA  GI: +BS, small smear in AM, passing flatus. Prn senna given this evening. Continent.  : incontinent.  Pulmonary: LS clear.  Pain: denies pain.   Drains/Lines: PIV SL  Skin: WNL  Activity: Ax2 with lift. W/c bound at baseline.  Diet: Reg w/ thin liquids. Takes pills whole, encourage fluids intake.      Discharge: discharge to home tomorrow pending. Non emergent EMS transport ride scheduled for 2:11pm-2:56pm.   (Pts mother currently in 729, refusing to leave if his ride is not rescheduled to be at the same time as hers)     Aggression Stoplight Tool: green

## 2023-08-08 NOTE — TELEPHONE ENCOUNTER
"Patient's mother Ivette calling to seek information on transport for herself and patient. Mother stated PCP has \"called transport for them before\" and she stated they do not trust hospital staff to transport them. Mother also stated she and patient will be discharging at two separate times. RN informed mother PCP does not determine time of discharge and to try to work with hospital staff on timing, mother refused stating \"The care here has been garbage, this place needs to be shut down\". Mother is requesting PCP recommendations on possible transport services for today.     Routing to PCP for review. Mother stated PCP has helped managed transport in the past. Do you have any additional recommendations? Thanks.   "

## 2023-08-08 NOTE — PROGRESS NOTES
Pt admitted to OBS because his mother was admitted and she is his caregiver. Medically ready for discharge. Pt discharged to home with his mom via stretcher today.

## 2023-08-08 NOTE — PROGRESS NOTES
[facility]  Hospitalist Progress Note  Ezekiel Gibson MD  08/08/2023    Chart reviewed, mother who is his caretaker is in the room.  Patient is medically stable and ready for discharge.  60 minutes was spent explaining to the patient her options for disposition.      Plan # 1 both patient and mother discharge on the same transport vehicle  Plan # 2 both patient and mother discharge in separate rigs as close to each other as possible.    Ezekiel Gibson MDpati      Assessment & Plan   Patient presenting to the emergency department after his mother had to present to the emergency department for concern of stroke, patient with no caregiver at home while his mother is being admitted for stroke evaluation.     His mother is admitted at [facility] with medical issues and will need to coordinate his discharge with her discharge as she is his primary caregiver       Family care conference was held with his mother Ivette  and patient Bola , Care coordinator and Ivette's physician hospitalist and Dr Farmer on On 8/2/2023. Both patient and his mother expressed previous bad experience with his TCU stay  Patient's mother was discharged to TCU and she has appealed the discharge, waiting to hear about the appeal, I am not sure why the patient is still in the hospital, there was a safe discharge plan on Friday,, currently discharge plan depending on social work input.  Patient is medically stable for discharge anytime.     History of cerebral palsy  -Continue home medications     Cardiac, hypertension, hyperlipidemia  - Continue home meds   - Maxzide, norvasc, lipitor  -Increase amlodipine to 5 mg daily      History of anxiety, depression:   Mood appears stable  Continue PTA zoloft     History of GERD:   Continue daily PPI        DVT Prophylaxis: Ambulate every shift  Code Status: Full Code        Interval History   Patient is alert, awake and able to participate    -Data reviewed today: I reviewed all new labs  "and imaging over the last 24 hours. I personally reviewed no images or EKG's today.    Physical Exam    , Blood pressure (!) 148/90, pulse 86, temperature 98.2  F (36.8  C), temperature source Oral, resp. rate 18, height 1.676 m (5' 6\"), weight 77.3 kg (170 lb 6.7 oz), SpO2 98 %.  Vitals:    07/29/23 2055 07/30/23 2224   Weight: 65 kg (143 lb 4.8 oz) 77.3 kg (170 lb 6.7 oz)     Vital Signs with Ranges  Temp:  [97.8  F (36.6  C)-99  F (37.2  C)] 98.2  F (36.8  C)  Pulse:  [79-96] 86  Resp:  [18] 18  BP: (138-148)/(65-92) 148/90  SpO2:  [93 %-98 %] 98 %  I/O's Last 24 hours  I/O last 3 completed shifts:  In: 360 [P.O.:360]  Out: -     Constitutional: Awake, alert, cooperative, no apparent distress  Other:  CP    Medications   All medications were reviewed.     amLODIPine  5 mg Oral Daily    baclofen  20 mg Oral 5x Daily    fluticasone  1 spray Both Nostrils Daily    sertraline  200 mg Oral Daily    sodium chloride (PF)  3 mL Intracatheter Q8H    triamterene-HCTZ  1 tablet Oral Daily        Data   No lab results found in last 7 days.    Invalid input(s): TROP, TROPONINIES    No results found for this or any previous visit (from the past 24 hour(s)).    Ezekiel Gibson MD  Text Page  (7am to 6pm)       "

## 2023-08-08 NOTE — TELEPHONE ENCOUNTER
Called patients mother and message relayed to Dr. Quevedo that they're unable to get transportation.       ORALIA Saenz  [facility]

## 2023-08-09 NOTE — PROGRESS NOTES
Vulnerable adults report completed by this writer with date: Wed Aug 09 2023 09:59:16. Report printed and filed accordingly.     Web Report Number: [id number]    ROBERT Prather, LGROEL   Social Work   [facility]

## 2023-08-10 ENCOUNTER — PATIENT OUTREACH (OUTPATIENT)
Dept: FAMILY MEDICINE | Facility: CLINIC | Age: 63
End: 2023-08-10
Payer: MEDICARE

## 2023-08-10 NOTE — TELEPHONE ENCOUNTER
What type of discharge? Observation  Risk of Hospital admission or ED visit: 88%  Is a TCM episode required? Yes  When should the patient follow up with PCP? N/A  Maciel Ruiz RN  [facility]

## 2023-09-13 ENCOUNTER — TELEPHONE (OUTPATIENT)
Dept: FAMILY MEDICINE | Facility: CLINIC | Age: 63
End: 2023-09-13
Payer: MEDICARE

## 2023-09-13 NOTE — TELEPHONE ENCOUNTER
How do I do this without a f2f, they ask for this on the home care order    Ezekiel Quevedo M.D.
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
## 2023-09-13 NOTE — TELEPHONE ENCOUNTER
Called Elina back.  Face to face was completed by the hospital and they will fax orders to [facility].  We can await further order requests from homecare.  Mother will also be receiving homecare support  at same time for situation.

## 2023-09-27 ENCOUNTER — TELEPHONE (OUTPATIENT)
Dept: FAMILY MEDICINE | Facility: CLINIC | Age: 63
End: 2023-09-27

## 2023-09-27 DIAGNOSIS — Z23 NEED FOR VACCINATION: Primary | ICD-10-CM

## 2023-09-27 DIAGNOSIS — Z23 HIGH PRIORITY FOR 2019-NCOV VACCINE: ICD-10-CM

## 2023-09-27 NOTE — TELEPHONE ENCOUNTER
Community paramedic requesting orders in Epic for flu and COVID19 vaccines for patient     Soha OBRIEN, Triage RN  [facility]

## 2023-09-29 DIAGNOSIS — R25.2 SPASM: ICD-10-CM

## 2023-09-29 RX ORDER — DIAZEPAM 5 MG
5 TABLET ORAL
Qty: 30 TABLET | Refills: 0 | Status: SHIPPED | OUTPATIENT
Start: 2023-09-29 | End: 2024-01-29

## 2023-10-04 ENCOUNTER — PATIENT OUTREACH (OUTPATIENT)
Dept: CARE COORDINATION | Facility: CLINIC | Age: 63
End: 2023-10-04

## 2023-10-04 ENCOUNTER — ALLIED HEALTH/NURSE VISIT (OUTPATIENT)
Dept: OTHER | Facility: CLINIC | Age: 63
End: 2023-10-04
Payer: MEDICARE

## 2023-10-04 VITALS
HEART RATE: 88 BPM | OXYGEN SATURATION: 97 % | SYSTOLIC BLOOD PRESSURE: 130 MMHG | DIASTOLIC BLOOD PRESSURE: 80 MMHG | TEMPERATURE: 97.8 F

## 2023-10-04 DIAGNOSIS — Z23 NEED FOR VACCINATION: ICD-10-CM

## 2023-10-04 DIAGNOSIS — Z23 NEED FOR VACCINATION: Primary | ICD-10-CM

## 2023-10-04 PROCEDURE — 90682 RIV4 VACC RECOMBINANT DNA IM: CPT

## 2023-10-04 PROCEDURE — G0008 ADMIN INFLUENZA VIRUS VAC: HCPCS

## 2023-10-04 PROCEDURE — 99207 PR COMMUNITY PARAMEDIC - PATIENT NOT BILLABLE: CPT

## 2023-10-04 NOTE — PROGRESS NOTES
Community Paramedic Program   COVID Vaccine Clinic    Referred for: Pfizer vaccine (2023-24)    Date and type of last vaccine/booster: 1/18/23, bivalent booster    Signed order for Pfizer dose in chart?: yes (9/27/23)    Visit scheduled for Wednesday, October 11th at 12 pm with CP Tereza    Additional information:   Pt is scheduled to visit with the CP today at 2 pm. Messaging pt's PCP to request new Community Paramedic referral please, so CP can visit pt and administer his flu shot today.    Asked CP to ask pt today if he and his mother are available for a visit next Wednesday to receive the Pfizer vaccination.     Bridgette Kumar  Community Health Worker  Community Paramedic program  [phone]  [email or web address]

## 2023-10-04 NOTE — PROGRESS NOTES
"Community Paramedic One Time Visit    October 4, 2023; 4:36 PM    Ambrocio Delvalle is a 63 year old year old adult being seen at home visit    Present at appointment:  patient, mother    Vitals:  BP Readings from Last 1 Encounters:   10/04/23 130/80     Pulse Readings from Last 1 Encounters:   10/04/23 88     Wt Readings from Last 1 Encounters:   07/30/23 77.3 kg (170 lb 6.7 oz)     Ht Readings from Last 1 Encounters:   07/30/23 1.676 m (5' 6\")         Clinical Concerns:  Current Medical Concerns:  Need for (Vaccination; Lab draw/sample)    Education Provided to patient: Monitor for signs and symptoms of allergic reaction to vaccine, call 911 if needed   Flu Shot given: Yes  COVID Vaccine Given: No  Lab draw or specimen collection: No      Plan:     Time spent with patient: 30    The patient meets one or more of the following criteria:  * Requires services to prevent readmission to a nursing home or hospital      Issues for Provider to follow up on: Community Paramedic visited patient in hotel.    A Flu vaccination was administered and the pt observed for 20 minutes afterwards with no adverse effects noted.    "

## 2023-10-11 ENCOUNTER — ALLIED HEALTH/NURSE VISIT (OUTPATIENT)
Dept: OTHER | Facility: CLINIC | Age: 63
End: 2023-10-11
Payer: MEDICARE

## 2023-10-11 VITALS
DIASTOLIC BLOOD PRESSURE: 80 MMHG | HEART RATE: 77 BPM | SYSTOLIC BLOOD PRESSURE: 142 MMHG | OXYGEN SATURATION: 98 % | TEMPERATURE: 97.6 F

## 2023-10-11 DIAGNOSIS — Z23 NEED FOR COVID-19 VACCINE: Primary | ICD-10-CM

## 2023-10-11 PROCEDURE — 99207 PR COMMUNITY PARAMEDIC - PATIENT NOT BILLABLE: CPT

## 2023-10-11 PROCEDURE — 90480 ADMN SARSCOV2 VAC 1/ONLY CMP: CPT

## 2023-10-11 PROCEDURE — 91320 SARSCV2 VAC 30MCG TRS-SUC IM: CPT

## 2023-10-11 NOTE — PROGRESS NOTES
"Community Paramedic One Time Visit    October 11, 2023; 5:11 PM    Ambrocio Devlalle is a 63 year old year old adult being seen at home visit    Present at appointment:  patient    Vitals:  BP Readings from Last 1 Encounters:   10/11/23 (!) 142/80     Pulse Readings from Last 1 Encounters:   10/11/23 77     Wt Readings from Last 1 Encounters:   07/30/23 77.3 kg (170 lb 6.7 oz)     Ht Readings from Last 1 Encounters:   07/30/23 1.676 m (5' 6\")         Clinical Concerns:  Current Medical Concerns:  Need for (Vaccination; Lab draw/sample)    Education Provided to patient: Monitor for signs and symptoms of allergic reaction to vaccine, call 911 if needed   Flu Shot given: No  COVID Vaccine Given: Yes  Lab draw or specimen collection: No      Plan:     Time spent with patient: 30    The patient meets one or more of the following criteria:  * Requires services to prevent readmission to a nursing home or hospital    Acute concern/Follow-up recommendations: One- time visit, will need new CP referral if additional needs arise    Issues for Provider to follow up on: Community Paramedic visited patient in home, provided one-time visit.     A Covid vaccination was administered and the pt observed for 20 minutes after with no adverse effects.    "

## 2023-10-27 ENCOUNTER — NURSE TRIAGE (OUTPATIENT)
Dept: FAMILY MEDICINE | Facility: CLINIC | Age: 63
End: 2023-10-27
Payer: MEDICARE

## 2023-10-27 ENCOUNTER — OFFICE VISIT (OUTPATIENT)
Dept: URGENT CARE | Facility: URGENT CARE | Age: 63
End: 2023-10-27
Payer: MEDICARE

## 2023-10-27 VITALS
OXYGEN SATURATION: 99 % | DIASTOLIC BLOOD PRESSURE: 84 MMHG | SYSTOLIC BLOOD PRESSURE: 144 MMHG | HEART RATE: 84 BPM | TEMPERATURE: 98.9 F

## 2023-10-27 DIAGNOSIS — J06.9 VIRAL UPPER RESPIRATORY TRACT INFECTION WITH COUGH: Primary | ICD-10-CM

## 2023-10-27 PROCEDURE — 87635 SARS-COV-2 COVID-19 AMP PRB: CPT | Performed by: PHYSICIAN ASSISTANT

## 2023-10-27 PROCEDURE — 99213 OFFICE O/P EST LOW 20 MIN: CPT | Performed by: PHYSICIAN ASSISTANT

## 2023-10-27 NOTE — PROGRESS NOTES
URGENT CARE VISIT:    SUBJECTIVE:   Ambrocio Delvalle is a 63 year old male presenting with a chief complaint of runny nose, cough - productive, and sore throat.  Onset was 4 day(s) ago.   He denies the following symptoms: shortness of breath  Course of illness is same.    Treatment measures tried include None tried with no relief of symptoms.  Predisposing factors include None.    PMH:   Past Medical History:   Diagnosis Date    Cerebral palsy (H)     Chronic cough 2021    seen by pulm in 2022, ct chest done, felt due to chronic aspiration and reflux    Depression, major, in partial remission (H24)     High cholesterol     Hypertension     diarrhea with diovan, added norvasc 2021    Low HDL (under 40)     Lumbar scoliosis     Lung nodule 2002    fu benign    Microhematuria 2007    ct neg cysto inflammation    Nasal congestion     Nephrolithiasis 1986    urol eval Dr. Beverly    Osteoporosis 2006, fu 2010    dexa 11/10 -1.5 fem neck and -2.9 total hip; fu 2016 and worse, -4.0 hip; seen by Dr. Gale 10/19 and started prolia    Pharyngitis 2002    hosp fsd    Pulmonary HTN (H) 2008    seen on echo, fu 12/16 not seen, nl ef, grade 1 dd    Reflex sympathetic dystrophy of lower extremity     Right ventricular hypertrophy 2008 on echo    ?due to sandie, had fu with Dr. Gaitan, never did sleep study, fu echo 12/16 no rvh seen    Sweats, sweating, excessive 2008    echo with rvh and pulm htn, ct chest, abd and pelvis with liver cysts and old lung nodule, cosyntropin stim test neg    Urethral stricture 2007    had cysto and ct done for hematuria as well    Vitamin D deficiency      Allergies: Penicillins, Atorvastatin, and Simvastatin   Medications:   Current Outpatient Medications   Medication Sig Dispense Refill    acetaminophen (TYLENOL) 325 MG tablet Take 325 mg by mouth every 6 hours as needed for mild pain      amLODIPine (NORVASC) 2.5 MG tablet Take 1 tablet (2.5 mg) by mouth daily 90 tablet 3    baclofen (LIORESAL) 20  MG tablet Take 1 tablet (20 mg) by mouth 5 times daily 120 tablet 0    cetirizine (ZYRTEC) 10 MG tablet Take 10 mg by mouth daily      denosumab (PROLIA) 60 MG/ML SOSY injection Inject 1 mL (60 mg) Subcutaneous every 6 months 1 mL 1    diazepam (VALIUM) 5 MG tablet TAKE 1 TABLET (5 MG) BY MOUTH NIGHTLY AS NEEDED 30 tablet 0    FLONASE 50 MCG/ACT nasal spray Spray 1 spray into both nostrils daily 16 g 5    LIPITOR 20 MG tablet TAKE 1 TABLET (20 MG) BY MOUTH DAILY *FOCUS [employer] BRAND* Strength: 20 mg 90 tablet 3    order for DME Equipment being ordered: Wheelchair    Power wheelchair with tilt and recline function 1 Units 0    ORDER FOR DME Equipment being ordered: Hospital Bed 1 Units 0    ORDER FOR DME Equipment being ordered: Wheelchair 1 Units 0    potassium chloride ER (KLOR-CON) 8 MEQ CR tablet TAKE 2 TABLETS (16 MEQ) BY MOUTH DAILY. 180 tablet 3    sertraline (ZOLOFT) 100 MG tablet TAKE 2 TABLETS BY MOUTH DAILY *DandelionSTAR BRAND* 180 tablet 3    triamterene-HCTZ (DYAZIDE) 37.5-25 MG capsule TAKE 1 CAPSULE BY MOUTH EVERY MORNING *SANDOZ* Strength: 37.5-25 mg 90 capsule 3    Vitamin D3 (CHOLECALCIFEROL) 125 MCG (5000 UT) tablet Take by mouth daily      omeprazole (PRILOSEC) 20 MG DR capsule Take 1 capsule (20 mg) by mouth daily (Patient not taking: Reported on 10/27/2023) 90 capsule 3     Social History:   Social History     Tobacco Use    Smoking status: Never    Smokeless tobacco: Never   Substance Use Topics    Alcohol use: No     Alcohol/week: 0.0 standard drinks of alcohol       ROS:  Review of systems negative except as stated above.    OBJECTIVE:  BP (!) 144/84   Pulse 84   Temp 98.9  F (37.2  C) (Tympanic)   SpO2 99%   GENERAL APPEARANCE: healthy, alert and no distress  EYES: EOMI,  PERRL, conjunctiva clear  HENT: ear canals and TM's normal.  Nose and mouth without ulcers, erythema or lesions  NECK: supple, nontender, no lymphadenopathy  RESP: lungs clear to auscultation - no rales, rhonchi or wheezes  CV:  regular rates and rhythm, normal S1 S2, no murmur noted  SKIN: no suspicious lesions or rashes      ASSESSMENT:    ICD-10-CM    1. Acute cough  R05.1 Symptomatic COVID-19 Virus (Coronavirus) by PCR Nose          PLAN:  Patient Instructions   Patient was educated on the natural course of viral illness which typically lasts up to 10 days. COVID PCR is pending. Conservative measures discussed including increased fluids, nasal saline irrigation (neti pot), warm steamy shower, cough suppressants, expectorants (Mucinex), and analgesics (Tylenol and/or Ibuprofen). See your primary care provider if symptoms worsen or do not improve in 7 days. Seek emergency care if you develop fever over 104 or shortness of breath.    Patient verbalized understanding and is agreeable to plan. The patient was discharged ambulatory and in stable condition.    Osiris Cash PA-C ....................  10/27/2023   2:32 PM

## 2023-10-27 NOTE — TELEPHONE ENCOUNTER
"Patients mother called with pt on the call. Pt has a cough, nasal congestion with clear drainage since Tuesday. He had  a fever on Tuesday 99.6 F but this resolved. Pt has also had sone relief with OTC cough syrup at night. Pt denies chest pain, unclear of breathing problems. Pt reports he has \" noisy breathing\".  Triage advised pt/mother on home care treatment- fluids, rest tea with honey,  but with \"noisy breathing \"report, it is best to get examined today. Mother was informed of  hours today. She agreed to arrange transportation to have pt examined at  today.      Reason for Disposition   SEVERE coughing spells (e.g., whooping sound after coughing, vomiting after coughing)    Additional Information   Negative: Bluish (or gray) lips or face   Negative: SEVERE difficulty breathing (e.g., struggling for each breath, speaks in single words)   Negative: Rapid onset of cough and has hives   Negative: Coughing started suddenly after medicine, an allergic food or bee sting   Negative: Difficulty breathing after exposure to flames, smoke, or fumes   Negative: Sounds like a life-threatening emergency to the triager   Negative: Dry cough (non-productive; no sputum or minimal clear sputum) and within 14 days of COVID-19 Exposure   Negative: Previous asthma attacks and this feels like asthma attack   Negative: MODERATE difficulty breathing (e.g., speaks in phrases, SOB even at rest, pulse 100-120) and still present when not coughing   Negative: Chest pain present when not coughing   Negative: Passed out (i.e., fainted, collapsed and was not responding)   Negative: Patient sounds very sick or weak to the triager   Negative: MILD difficulty breathing (e.g., minimal/no SOB at rest, SOB with walking, pulse <100) and still present when not coughing   Negative: Coughed up > 1 tablespoon (15 ml) blood (Exception: Blood-tinged sputum.)   Negative: Fever > 103 F (39.4 C)   Negative: Fever > 101 F (38.3 C) and over 60 years of " age   Negative: Fever > 100.0 F (37.8 C) and has diabetes mellitus or a weak immune system (e.g., HIV positive, cancer chemotherapy, organ transplant, splenectomy, chronic steroids)   Negative: Fever > 100.0 F (37.8 C) and bedridden (e.g., CVA, chronic illness, recovering from surgery)   Negative: Increasing ankle swelling   Negative: Wheezing is present    Protocols used: Cough-A-OH

## 2023-10-27 NOTE — PATIENT INSTRUCTIONS
Patient was educated on the natural course of viral illness which typically lasts up to 10 days. COVID PCR is pending. Conservative measures discussed including increased fluids, nasal saline irrigation (neti pot), warm steamy shower, cough suppressants, expectorants (Mucinex), and analgesics (Tylenol and/or Ibuprofen). See your primary care provider if symptoms worsen or do not improve in 7 days. Seek emergency care if you develop fever over 104 or shortness of breath.

## 2023-10-28 LAB — SARS-COV-2 RNA RESP QL NAA+PROBE: NEGATIVE

## 2023-11-03 NOTE — NURSING NOTE
"Chief Complaint   Patient presents with     Physical       Initial BP (!) 162/92 (BP Location: Left arm, Cuff Size: Adult Regular)  Pulse 87  Temp 98  F (36.7  C) (Oral)  Ht 5' 4\" (1.626 m)  Wt 175 lb (79.4 kg)  SpO2 96%  BMI 30.04 kg/m2 Estimated body mass index is 30.04 kg/(m^2) as calculated from the following:    Height as of this encounter: 5' 4\" (1.626 m).    Weight as of this encounter: 175 lb (79.4 kg).  Medication Reconciliation: complete     Elisa Omalley MA    "
Detail Level: Detailed
Patient Specific Counseling (Will Not Stick From Patient To Patient): Pat will do PDT

## 2023-11-25 DIAGNOSIS — I10 BENIGN ESSENTIAL HYPERTENSION: ICD-10-CM

## 2023-11-25 DIAGNOSIS — E78.5 HYPERLIPIDEMIA LDL GOAL <130: ICD-10-CM

## 2023-11-25 DIAGNOSIS — R25.2 SPASM: ICD-10-CM

## 2023-11-27 RX ORDER — ATORVASTATIN CALCIUM 20 MG
TABLET ORAL
Qty: 90 TABLET | Refills: 3 | Status: SHIPPED | OUTPATIENT
Start: 2023-11-27 | End: 2024-04-08

## 2023-11-27 RX ORDER — BACLOFEN 20 MG/1
20 TABLET ORAL
Qty: 120 TABLET | Refills: 0 | Status: SHIPPED | OUTPATIENT
Start: 2023-11-27 | End: 2024-01-29

## 2023-11-27 RX ORDER — AMLODIPINE BESYLATE 2.5 MG/1
2.5 TABLET ORAL DAILY
Qty: 90 TABLET | Refills: 3 | Status: SHIPPED | OUTPATIENT
Start: 2023-11-27 | End: 2024-04-08

## 2023-11-28 ENCOUNTER — TELEPHONE (OUTPATIENT)
Dept: FAMILY MEDICINE | Facility: CLINIC | Age: 63
End: 2023-11-28
Payer: MEDICARE

## 2023-11-28 NOTE — TELEPHONE ENCOUNTER
Prior Authorization Retail Medication Request    Medication/Dose: Lipitor 20 mg *DAW1*  Diagnosis and ICD code (if different than what is on RX):  E78.5  New/renewal/insurance change PA/secondary ins. PA:  Previously Tried and Failed:  Atorvastatin, Simvastatin, Rosuvastatin  Rationale:  Patient developed muscle cramps while on generic Atorvastatin and has had no side effects since switching to brand name    Insurance   Primary: [facility]  Insurance ID:  [id number]    Secondary (if applicable):[facility] [email or web address]  Insurance ID:  [id number]    Pharmacy Information (if different than what is on RX)  Name:  [facility]  Phone:  [phone]  Fax:[phone]

## 2023-11-28 NOTE — LETTER
December 1, 2023      Ambrocio Delvalle  [address]  [place] [facility] [place] [address]        To Whom It May Concern,     RE: Letter of Medical Necessity (formulary exception) for LIPITOR 20mg DAILY PALMER  Member # [id number]      The above-named patient has been prescribed brand-name Lipitor. this is a prescription that the patient has been on for some time in the past.    The patient has exhibited myalgias with multiple other statins including generic atorvastatin, simvastatin and rosuvastatin.    Your preferred list of formulary equivalents are inappropriate.  They are in different non-HMG Co. a reductase class.    As multiple studies have shown HMG Co. A reductase inhibitors are superior in lipid management.  This holds especially true for this patient with concomitant hypertension.      Please review your earlier decision to deny this patient the above prescription.  A medication that was previously approved and that he has done well with.    Sincerely,        Ezekiel Quevedo MD        .

## 2023-12-01 NOTE — TELEPHONE ENCOUNTER
Central Prior Authorization Team   Phone: [phone]    PA Initiation    Medication: LIPITOR 20 MG PO TABS  Insurance Company: [employer] - Phone [phone] Fax [phone]  Pharmacy Filling the Rx: [facility] [place] - [address] [place] [address]  Filling Pharmacy Phone: [phone]  Filling Pharmacy Fax:    Start Date: 11/30/2023

## 2023-12-01 NOTE — TELEPHONE ENCOUNTER
PRIOR AUTHORIZATION DENIED    Medication: LIPITOR 20 MG PO TABS  Insurance Company: [employer] - Phone [phone] Fax [phone]  Denial Date: 12/1/2023  Denial Rational: MUST TRY/FAIL EZETIMIBE TAB, NEXLETOL TAB, AND NEXLIZET TAB      Appeal Information: IF PROVIDER WOULD LIKE TO APPEAL THIS DECISION PLEASE PROVIDE THE PA TEAM WITH A LETTER OF MEDICAL NECESSITY        Patient Notified: No

## 2023-12-01 NOTE — TELEPHONE ENCOUNTER
Medication Appeal Initiation    Medication: LIPITOR 20 MG PO TABS  Appeal Start Date:  12/1/2023  Insurance Company: [employer] Phone: [phone]  Insurance Fax: [phone]  Comments:       Faxed letter of medical necessity and chart notes to insurance -

## 2023-12-01 NOTE — TELEPHONE ENCOUNTER
Letter of medical necessity written. Please file for appeal.    Cas Du, SUNG on 12/1/2023 at 1:17 PM

## 2023-12-05 NOTE — TELEPHONE ENCOUNTER
MEDICATION APPEAL APPROVED    Medication: LIPITOR 20 MG PO TABS  Authorization Effective Date: 1/1/2023  Authorization Expiration Date: 12/31/2024  Appeal Insurance Company: [employer] Pharmacy: [address]  Patient Notified: YES (pharmacy will notify the patient when ready)  Comments:

## 2024-01-29 DIAGNOSIS — R25.2 SPASM: ICD-10-CM

## 2024-01-29 RX ORDER — DIAZEPAM 5 MG
5 TABLET ORAL
Qty: 30 TABLET | Refills: 0 | Status: SHIPPED | OUTPATIENT
Start: 2024-01-29 | End: 2024-04-19

## 2024-01-29 RX ORDER — BACLOFEN 20 MG/1
20 TABLET ORAL
Qty: 120 TABLET | Refills: 0 | Status: SHIPPED | OUTPATIENT
Start: 2024-01-29 | End: 2024-04-08

## 2024-03-11 ENCOUNTER — TELEPHONE (OUTPATIENT)
Dept: FAMILY MEDICINE | Facility: CLINIC | Age: 64
End: 2024-03-11
Payer: MEDICARE

## 2024-03-11 NOTE — TELEPHONE ENCOUNTER
Reason for Call:  Appointment Request    Patient requesting this type of appt:  Preventive     Requested provider: Ezekiel Quevedo    Reason patient unable to be scheduled: Not within requested timeframe    When does patient want to be seen/preferred time: 1-2 weeks    Comments: family of two    Could we send this information to you in [facility] or would you prefer to receive a phone call?:   Patient would prefer a phone call   Okay to leave a detailed message?: Yes at Home number on file [phone] (home)    Call taken on 3/11/2024 at 3:30 PM by Gail Leung

## 2024-03-25 ENCOUNTER — TELEPHONE (OUTPATIENT)
Dept: FAMILY MEDICINE | Facility: CLINIC | Age: 64
End: 2024-03-25
Payer: MEDICARE

## 2024-03-25 DIAGNOSIS — G80.1 SPASTIC DIPLEGIC CEREBRAL PALSY (H): Primary | ICD-10-CM

## 2024-03-25 NOTE — TELEPHONE ENCOUNTER
Order/Referral Request    Who is requesting: Pt    Orders being requested: Assessment for Power wheelchair    Reason service is needed/diagnosis: pt need new power wheelchair.     When are orders needed by: asap    Has this been discussed with Provider: No    Does patient have a preference on a Group/Provider/Facility? NA    Does patient have an appointment scheduled?: No    Where to send orders: Fax: [phone]    Could we send this information to you in [employer] or would you prefer to receive a phone call?:   Patient would prefer a phone call   Okay to leave a detailed message?: Yes at Cell number on file:    Telephone Information:   Mobile [phone]

## 2024-03-26 NOTE — TELEPHONE ENCOUNTER
I put in the order.  However, I wish you were that simple.  My suspicion is he will need to see a therapist and I will need to see him in person and do a long form for this.    Ezekiel Quevedo M.D.

## 2024-03-28 NOTE — TELEPHONE ENCOUNTER
Notified pt and mother, and they already knew assessment needed, and this is going to [facility].  Faxed over.  Blanquita Hercules, RN  [facility] RN Triage Team

## 2024-04-08 ENCOUNTER — OFFICE VISIT (OUTPATIENT)
Dept: FAMILY MEDICINE | Facility: CLINIC | Age: 64
End: 2024-04-08
Payer: MEDICARE

## 2024-04-08 VITALS
DIASTOLIC BLOOD PRESSURE: 84 MMHG | BODY MASS INDEX: 27.51 KG/M2 | HEART RATE: 85 BPM | RESPIRATION RATE: 16 BRPM | HEIGHT: 66 IN | TEMPERATURE: 98.9 F | SYSTOLIC BLOOD PRESSURE: 135 MMHG | OXYGEN SATURATION: 97 %

## 2024-04-08 DIAGNOSIS — F33.41 RECURRENT MAJOR DEPRESSIVE DISORDER, IN PARTIAL REMISSION (H): ICD-10-CM

## 2024-04-08 DIAGNOSIS — R05.3 CHRONIC COUGH: ICD-10-CM

## 2024-04-08 DIAGNOSIS — M62.838 MUSCLE SPASM: ICD-10-CM

## 2024-04-08 DIAGNOSIS — I10 BENIGN ESSENTIAL HYPERTENSION: ICD-10-CM

## 2024-04-08 DIAGNOSIS — Z23 HIGH PRIORITY FOR 2019-NCOV VACCINE: ICD-10-CM

## 2024-04-08 DIAGNOSIS — E55.9 VITAMIN D DEFICIENCY: ICD-10-CM

## 2024-04-08 DIAGNOSIS — M81.0 OSTEOPOROSIS, UNSPECIFIED OSTEOPOROSIS TYPE, UNSPECIFIED PATHOLOGICAL FRACTURE PRESENCE: ICD-10-CM

## 2024-04-08 DIAGNOSIS — G80.9 CEREBRAL PALSY, UNSPECIFIED TYPE (H): ICD-10-CM

## 2024-04-08 DIAGNOSIS — Z12.11 SPECIAL SCREENING FOR MALIGNANT NEOPLASMS, COLON: ICD-10-CM

## 2024-04-08 DIAGNOSIS — Z00.00 ENCOUNTER FOR MEDICARE ANNUAL WELLNESS EXAM: Primary | ICD-10-CM

## 2024-04-08 DIAGNOSIS — Z00.00 ENCOUNTER FOR ANNUAL PHYSICAL EXAM: ICD-10-CM

## 2024-04-08 DIAGNOSIS — G90.523 COMPLEX REGIONAL PAIN SYNDROME TYPE 1 OF BOTH LOWER EXTREMITIES: ICD-10-CM

## 2024-04-08 DIAGNOSIS — E78.5 HYPERLIPIDEMIA LDL GOAL <130: ICD-10-CM

## 2024-04-08 DIAGNOSIS — I10 PRIMARY HYPERTENSION: ICD-10-CM

## 2024-04-08 DIAGNOSIS — Z12.11 SCREEN FOR COLON CANCER: ICD-10-CM

## 2024-04-08 PROBLEM — Z78.9 UNABLE TO CARE FOR SELF: Status: RESOLVED | Noted: 2021-03-11 | Resolved: 2024-04-08

## 2024-04-08 LAB
ERYTHROCYTE [DISTWIDTH] IN BLOOD BY AUTOMATED COUNT: 13.1 % (ref 10–15)
HCT VFR BLD AUTO: 53.1 % (ref 40–53)
HGB BLD-MCNC: 16.9 G/DL (ref 13.3–17.7)
MCH RBC QN AUTO: 28.8 PG (ref 26.5–33)
MCHC RBC AUTO-ENTMCNC: 31.8 G/DL (ref 31.5–36.5)
MCV RBC AUTO: 91 FL (ref 78–100)
PLATELET # BLD AUTO: 297 10E3/UL (ref 150–450)
RBC # BLD AUTO: 5.87 10E6/UL (ref 4.4–5.9)
WBC # BLD AUTO: 8.1 10E3/UL (ref 4–11)

## 2024-04-08 PROCEDURE — 85027 COMPLETE CBC AUTOMATED: CPT | Performed by: INTERNAL MEDICINE

## 2024-04-08 PROCEDURE — 90480 ADMN SARSCOV2 VAC 1/ONLY CMP: CPT | Performed by: INTERNAL MEDICINE

## 2024-04-08 PROCEDURE — 80061 LIPID PANEL: CPT | Performed by: INTERNAL MEDICINE

## 2024-04-08 PROCEDURE — 99214 OFFICE O/P EST MOD 30 MIN: CPT | Mod: 25 | Performed by: INTERNAL MEDICINE

## 2024-04-08 PROCEDURE — G0439 PPPS, SUBSEQ VISIT: HCPCS | Performed by: INTERNAL MEDICINE

## 2024-04-08 PROCEDURE — 82306 VITAMIN D 25 HYDROXY: CPT | Performed by: INTERNAL MEDICINE

## 2024-04-08 PROCEDURE — 36415 COLL VENOUS BLD VENIPUNCTURE: CPT | Performed by: INTERNAL MEDICINE

## 2024-04-08 PROCEDURE — 91320 SARSCV2 VAC 30MCG TRS-SUC IM: CPT | Performed by: INTERNAL MEDICINE

## 2024-04-08 PROCEDURE — 80053 COMPREHEN METABOLIC PANEL: CPT | Performed by: INTERNAL MEDICINE

## 2024-04-08 RX ORDER — SERTRALINE HYDROCHLORIDE 100 MG/1
TABLET, FILM COATED ORAL
Qty: 180 TABLET | Refills: 3 | Status: SHIPPED | OUTPATIENT
Start: 2024-04-08

## 2024-04-08 RX ORDER — POTASSIUM CHLORIDE 600 MG/1
TABLET, FILM COATED, EXTENDED RELEASE ORAL
Qty: 180 TABLET | Refills: 3 | Status: SHIPPED | OUTPATIENT
Start: 2024-04-08

## 2024-04-08 RX ORDER — BACLOFEN 20 MG/1
20 TABLET ORAL
Qty: 120 TABLET | Refills: 0 | Status: SHIPPED | OUTPATIENT
Start: 2024-04-08 | End: 2024-07-02

## 2024-04-08 RX ORDER — TRIAMTERENE AND HYDROCHLOROTHIAZIDE 37.5; 25 MG/1; MG/1
CAPSULE ORAL
Qty: 90 CAPSULE | Refills: 3 | Status: SHIPPED | OUTPATIENT
Start: 2024-04-08

## 2024-04-08 RX ORDER — ATORVASTATIN CALCIUM 20 MG
TABLET ORAL
Qty: 90 TABLET | Refills: 3 | Status: SHIPPED | OUTPATIENT
Start: 2024-04-08

## 2024-04-08 RX ORDER — AMLODIPINE BESYLATE 2.5 MG/1
2.5 TABLET ORAL DAILY
Qty: 90 TABLET | Refills: 3 | Status: SHIPPED | OUTPATIENT
Start: 2024-04-08

## 2024-04-08 ASSESSMENT — PATIENT HEALTH QUESTIONNAIRE - PHQ9
SUM OF ALL RESPONSES TO PHQ QUESTIONS 1-9: 2
10. IF YOU CHECKED OFF ANY PROBLEMS, HOW DIFFICULT HAVE THESE PROBLEMS MADE IT FOR YOU TO DO YOUR WORK, TAKE CARE OF THINGS AT HOME, OR GET ALONG WITH OTHER PEOPLE: NOT DIFFICULT AT ALL
SUM OF ALL RESPONSES TO PHQ QUESTIONS 1-9: 2

## 2024-04-08 ASSESSMENT — PAIN SCALES - GENERAL: PAINLEVEL: EXTREME PAIN (8)

## 2024-04-08 NOTE — PATIENT INSTRUCTIONS
Preventive Care Advice   This is general advice given by our system to help you stay healthy. However, your care team may have specific advice just for you. Please talk to your care team about your preventive care needs.  Nutrition  Eat 5 or more servings of fruits and vegetables each day.  Try wheat bread, brown rice and whole grain pasta (instead of white bread, rice, and pasta).  Get enough calcium and vitamin D. Check the label on foods and aim for 100% of the RDA (recommended daily allowance).  Lifestyle  Exercise at least 150 minutes each week   (30 minutes a day, 5 days a week).  Do muscle strengthening activities 2 days a week. These help control your weight and prevent disease.  No smoking.  Wear sunscreen to prevent skin cancer.  Have a dental exam and cleaning every 6 months.  Yearly exams  See your health care team every year to talk about:  Any changes in your health.  Any medicines your care team has prescribed.  Preventive care, family planning, and ways to prevent chronic diseases.  Shots (vaccines)   HPV shots (up to age 26), if you've never had them before.  Hepatitis B shots (up to age 59), if you've never had them before.  COVID-19 shot: Get this shot when it's due.  Flu shot: Get a flu shot every year.  Tetanus shot: Get a tetanus shot every 10 years.  Pneumococcal, hepatitis A, and RSV shots: Ask your care team if you need these based on your risk.  Shingles shot (for age 50 and up).  General health tests  Diabetes screening:  Starting at age 35, Get screened for diabetes at least every 3 years.  If you are younger than age 35, ask your care team if you should be screened for diabetes.  Cholesterol test: At age 39, start having a cholesterol test every 5 years, or more often if advised.  Bone density scan (DEXA): At age 50, ask your care team if you should have this scan for osteoporosis (brittle bones).  Hepatitis C: Get tested at least once in your life.  STIs (sexually transmitted  infections)  Before age 24: Ask your care team if you should be screened for STIs.  After age 24: Get screened for STIs if you're at risk. You are at risk for STIs (including HIV) if:  You are sexually active with more than one person.  You don't use condoms every time.  You or a partner was diagnosed with a sexually transmitted infection.  If you are at risk for HIV, ask about PrEP medicine to prevent HIV.  Get tested for HIV at least once in your life, whether you are at risk for HIV or not.  Cancer screening tests  Cervical cancer screening: If you have a cervix, begin getting regular cervical cancer screening tests at age 21. Most people who have regular screenings with normal results can stop after age 65. Talk about this with your provider.  Breast cancer scan (mammogram): If you've ever had breasts, begin having regular mammograms starting at age 40. This is a scan to check for breast cancer.  Colon cancer screening: It is important to start screening for colon cancer at age 45.  Have a colonoscopy test every 10 years (or more often if you're at risk) Or, ask your provider about stool tests like a FIT test every year or Cologuard test every 3 years.  To learn more about your testing options, visit: https://[email or web address]/[email or web address].pdf.  For help making a decision, visit: https://bit.ly/dz09419.  Prostate cancer screening test: If you have a prostate and are age 55 to 69, ask your provider if you would benefit from a yearly prostate cancer screening test.  Lung cancer screening: If you are a current or former smoker age 50 to 80, ask your care team if ongoing lung cancer screenings are right for you.  For informational purposes only. Not to replace the advice of your health care provider. Copyright   2023 [employer]. All rights reserved. Clinically reviewed by the [facility] Transitions Program. [employer] [id number] - REV 01/24.

## 2024-04-08 NOTE — LETTER
April 9, 2024      Ambrocio Delvalle  [address]  [facility] [address]        Dear ,    It was a pleasure seeing you for your physical examination.  I wanted to get back to you with your test results.  I have enclosed a copy for your review.      I am happy to report that your cbc or complete blood count is normal with no signs of anemia, leukemia or platelet abnormalities.  Your chemistry panel shows no signs of diabetes.  Your blood salts, kidney tests, liver tests, vitamin D level, and proteins are all fine.     Your total cholesterol is 201 with the normal range being below 200.  Your HDL or good cholesterol is 43 with the normal range being above 40.  Your LDL or bad cholesterol is 139 with the normal range being below 130.  These numbers are fine.     I am happy to bring you this overall excellent report.  If you have any questions please call me.     Ezekiel Quevedo M.D.     Resulted Orders   CBC with platelets   Result Value Ref Range    WBC Count 8.1 4.0 - 11.0 10e3/uL    RBC Count 5.87 4.40 - 5.90 10e6/uL    Hemoglobin 16.9 13.3 - 17.7 g/dL    Hematocrit 53.1 (H) 40.0 - 53.0 %    MCV 91 78 - 100 fL    MCH 28.8 26.5 - 33.0 pg    MCHC 31.8 31.5 - 36.5 g/dL    RDW 13.1 10.0 - 15.0 %    Platelet Count 297 150 - 450 10e3/uL   Comprehensive metabolic panel   Result Value Ref Range    Sodium 141 135 - 145 mmol/L      Comment:      Reference intervals for this test were updated on 09/26/2023 to more accurately reflect our healthy population. There may be differences in the flagging of prior results with similar values performed with this method. Interpretation of those prior results can be made in the context of the updated reference intervals.     Potassium 4.2 3.4 - 5.3 mmol/L    Carbon Dioxide (CO2) 26 22 - 29 mmol/L    Anion Gap 14 7 - 15 mmol/L    Urea Nitrogen 14.8 8.0 - 23.0 mg/dL    Creatinine 0.82 0.67 - 1.17 mg/dL    GFR Estimate >90 >60 mL/min/1.73m2    Calcium 9.8 8.8 - 10.2 mg/dL     Chloride 101 98 - 107 mmol/L    Glucose 84 70 - 99 mg/dL    Alkaline Phosphatase 135 40 - 150 U/L      Comment:      Reference intervals for this test were updated on 11/14/2023 to more accurately reflect our healthy population. There may be differences in the flagging of prior results with similar values performed with this method. Interpretation of those prior results can be made in the context of the updated reference intervals.    AST 19 0 - 45 U/L      Comment:      Reference intervals for this test were updated on 6/12/2023 to more accurately reflect our healthy population. There may be differences in the flagging of prior results with similar values performed with this method. Interpretation of those prior results can be made in the context of the updated reference intervals.    ALT 12 0 - 70 U/L      Comment:      Reference intervals for this test were updated on 6/12/2023 to more accurately reflect our healthy population. There may be differences in the flagging of prior results with similar values performed with this method. Interpretation of those prior results can be made in the context of the updated reference intervals.      Protein Total 7.5 6.4 - 8.3 g/dL    Albumin 4.6 3.5 - 5.2 g/dL    Bilirubin Total 0.4 <=1.2 mg/dL   Lipid panel reflex to direct LDL Fasting   Result Value Ref Range    Cholesterol 201 (H) <200 mg/dL    Triglycerides 97 <150 mg/dL    Direct Measure HDL 43 >=40 mg/dL    LDL Cholesterol Calculated 139 (H) <=100 mg/dL    Non HDL Cholesterol 158 (H) <130 mg/dL    Patient Fasting > 8hrs? Yes     Narrative    Cholesterol  Desirable:  <200 mg/dL    Triglycerides  Normal:  Less than 150 mg/dL  Borderline High:  150-199 mg/dL  High:  200-499 mg/dL  Very High:  Greater than or equal to 500 mg/dL    Direct Measure HDL  Female:  Greater than or equal to 50 mg/dL   Male:  Greater than or equal to 40 mg/dL    LDL Cholesterol  Desirable:  <100mg/dL  Above Desirable:  100-129 mg/dL   Borderline  High:  130-159 mg/dL   High:  160-189 mg/dL   Very High:  >= 190 mg/dL    Non HDL Cholesterol  Desirable:  130 mg/dL  Above Desirable:  130-159 mg/dL  Borderline High:  160-189 mg/dL  High:  190-219 mg/dL  Very High:  Greater than or equal to 220 mg/dL   Vitamin D Deficiency   Result Value Ref Range    Vitamin D, Total (25-Hydroxy) 42 20 - 50 ng/mL      Comment:      optimum levels    Narrative    Season, race, dietary intake, and treatment affect the concentration of 25-hydroxy-Vitamin D. Values may decrease during winter months and increase during summer months.    Vitamin D determination is routinely performed by an immunoassay specific for 25 hydroxyvitamin D3.  If an individual is on vitamin D2(ergocalciferol) supplementation, please specify 25 OH vitamin D2 and D3 level determination by LCMSMS test VITD23.
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
## 2024-04-09 LAB
ALBUMIN SERPL BCG-MCNC: 4.6 G/DL (ref 3.5–5.2)
ALP SERPL-CCNC: 135 U/L (ref 40–150)
ALT SERPL W P-5'-P-CCNC: 12 U/L (ref 0–70)
ANION GAP SERPL CALCULATED.3IONS-SCNC: 14 MMOL/L (ref 7–15)
AST SERPL W P-5'-P-CCNC: 19 U/L (ref 0–45)
BILIRUB SERPL-MCNC: 0.4 MG/DL
BUN SERPL-MCNC: 14.8 MG/DL (ref 8–23)
CALCIUM SERPL-MCNC: 9.8 MG/DL (ref 8.8–10.2)
CHLORIDE SERPL-SCNC: 101 MMOL/L (ref 98–107)
CHOLEST SERPL-MCNC: 201 MG/DL
CREAT SERPL-MCNC: 0.82 MG/DL (ref 0.67–1.17)
DEPRECATED HCO3 PLAS-SCNC: 26 MMOL/L (ref 22–29)
EGFRCR SERPLBLD CKD-EPI 2021: >90 ML/MIN/1.73M2
FASTING STATUS PATIENT QL REPORTED: YES
GLUCOSE SERPL-MCNC: 84 MG/DL (ref 70–99)
HDLC SERPL-MCNC: 43 MG/DL
LDLC SERPL CALC-MCNC: 139 MG/DL
NONHDLC SERPL-MCNC: 158 MG/DL
POTASSIUM SERPL-SCNC: 4.2 MMOL/L (ref 3.4–5.3)
PROT SERPL-MCNC: 7.5 G/DL (ref 6.4–8.3)
SODIUM SERPL-SCNC: 141 MMOL/L (ref 135–145)
TRIGL SERPL-MCNC: 97 MG/DL
VIT D+METAB SERPL-MCNC: 42 NG/ML (ref 20–50)

## 2024-04-09 NOTE — RESULT ENCOUNTER NOTE
It was a pleasure seeing you for your physical examination.  I wanted to get back to you with your test results.  I have enclosed a copy for your review.      I am happy to report that your cbc or complete blood count is normal with no signs of anemia, leukemia or platelet abnormalities.  Your chemistry panel shows no signs of diabetes.  Your blood salts, kidney tests, liver tests, vitamin D level, and proteins are all fine.    Your total cholesterol is 201 with the normal range being below 200.  Your HDL or good cholesterol is 43 with the normal range being above 40.  Your LDL or bad cholesterol is 139 with the normal range being below 130.  These numbers are fine.    I am happy to bring you this overall excellent report.  If you have any questions please call me.    Ezekiel Quevedo M.D.       Pt saw Dr Carmen Garsia in the office 8/21/20 and was given surgery instructions for a Sebaceous Cyst Excision, Posterior Scalp (Local Anes) scheduled on 8/28/20 @ 9:45am arrival 8:45am [facility] - Pt informed to hold Plavix 3 days prior to surgery

## 2024-04-12 ENCOUNTER — TELEPHONE (OUTPATIENT)
Dept: FAMILY MEDICINE | Facility: CLINIC | Age: 64
End: 2024-04-12
Payer: MEDICARE

## 2024-04-12 DIAGNOSIS — G80.1 SPASTIC DIPLEGIC CEREBRAL PALSY (H): Primary | ICD-10-CM

## 2024-04-12 NOTE — TELEPHONE ENCOUNTER
"Arnold Quevedo,    Per Mya Arnold Rep.:    -Patient is only eligible for DME following a PT/OT evaluation.    -Requesting that DME order be changed from:     \"ONLY FOR DME\"    TO     \"Evaluation by PT or OT for motorized wheelchair.\"     Fax number - [phone]  "

## 2024-04-12 NOTE — TELEPHONE ENCOUNTER
New order faxed and sent to the number listed below.     Called and spoke to Linda. Informed new order has been faxed. Linda expressed understanding and had no additional questions at this time.     Leonor Gallego RN

## 2024-04-16 ENCOUNTER — ALLIED HEALTH/NURSE VISIT (OUTPATIENT)
Dept: NURSING | Facility: CLINIC | Age: 64
End: 2024-04-16
Payer: MEDICARE

## 2024-04-16 DIAGNOSIS — M81.0 OSTEOPOROSIS, UNSPECIFIED OSTEOPOROSIS TYPE, UNSPECIFIED PATHOLOGICAL FRACTURE PRESENCE: Primary | ICD-10-CM

## 2024-04-16 PROCEDURE — 96372 THER/PROPH/DIAG INJ SC/IM: CPT | Performed by: INTERNAL MEDICINE

## 2024-04-16 PROCEDURE — 99207 PR NO CHARGE NURSE ONLY: CPT

## 2024-04-16 NOTE — PROGRESS NOTES
Clinic Administered Medication Documentation      Prolia Documentation    Indication: Prolia  (denosumab) is a prescription medicine used to treat osteoporosis in patients who:   Are at high risk for fracture, meaning patients who have had a fracture related to osteoporosis, or who have multiple risk factors for fracture.  Cannot use another osteoporosis medicine or other osteoporosis medicines did not work well.  The timeline for early/late injections would be 4 weeks early and any time after the 6 month debora. If a patient receives their injection late, then the subsequent injection would be 6 months from the date that they actually received the injection.    When was the last injection?  Over a year ago  Was the last injection at least 6 months ago? Yes  Has the prior authorization been completed?  Yes  Is there an active order (written within the past 365 days, with administrations remaining, not ) in the chart?  Yes  Patient denies any dental work involving the bone (e.g. tooth extraction or dental implants) in the past 4 weeks?  Yes  Patient denies plans for any dental work involving the bone (e.g. tooth extraction or dental implants) in the next 4 weeks? Yes    The following steps were completed to comply with the REMS program for Prolia:  Reviewed information in the Medication Guide and Patient Counseling Chart, including the serious risks of Prolia  and the symptoms of each risk.  Advised patient to seek prompt medical attention if they have signs or symptoms of any of the serious risks.  Provided each patient a copy of the Medication Guide and Patient Brochure.    Prior to injection, verified patient identity using patient's name and date of birth. Medication was administered. Please see MAR and medication order for additional information. Patient instructed to remain in clinic for 15 minutes and report any adverse reaction to staff immediately.    Vial/Syringe: Syringe

## 2024-04-19 DIAGNOSIS — R25.2 SPASM: ICD-10-CM

## 2024-04-19 RX ORDER — DIAZEPAM 5 MG
5 TABLET ORAL
Qty: 30 TABLET | Refills: 0 | Status: SHIPPED | OUTPATIENT
Start: 2024-04-19 | End: 2024-07-02

## 2024-07-02 DIAGNOSIS — G80.9 CEREBRAL PALSY, UNSPECIFIED TYPE (H): ICD-10-CM

## 2024-07-02 DIAGNOSIS — R25.2 SPASM: ICD-10-CM

## 2024-07-02 RX ORDER — DIAZEPAM 5 MG
5 TABLET ORAL
Qty: 30 TABLET | Refills: 0 | Status: SHIPPED | OUTPATIENT
Start: 2024-07-02 | End: 2024-08-18

## 2024-07-02 RX ORDER — BACLOFEN 20 MG/1
20 TABLET ORAL
Qty: 120 TABLET | Refills: 0 | Status: SHIPPED | OUTPATIENT
Start: 2024-07-02 | End: 2024-08-18

## 2024-07-25 ENCOUNTER — NURSE TRIAGE (OUTPATIENT)
Dept: FAMILY MEDICINE | Facility: CLINIC | Age: 64
End: 2024-07-25
Payer: MEDICARE

## 2024-07-25 NOTE — TELEPHONE ENCOUNTER
So if this was 1 episode of dizziness when he stood which resolved quickly and he feels completely fine and has not had dizziness otherwise I do not think anything needs to be done.    Ezekiel Quevedo M.D.

## 2024-07-25 NOTE — TELEPHONE ENCOUNTER
"Ivette states that dizziness last about 1 hour.   \"Ambrocio's eyes were rolling; ringing wet, slow to respond\"     Denies confusion, chest pain, shortness of breath    Symptoms have since resolved.        Reference:    "

## 2024-07-25 NOTE — TELEPHONE ENCOUNTER
A bit hard to know what exactly caused this.  Certainly if this occurs again he should go right to the emergency room.  If he is feeling completely back to normal and has no symptoms and has not been having chest pain or shortness of breath that I think we can wait.    Thank you    Ezekiel Quevedo M.D.

## 2024-07-25 NOTE — TELEPHONE ENCOUNTER
Nurse Triage SBAR    Is this a 2nd Level Triage? YES, LICENSED PRACTITIONER REVIEW IS REQUIRED    Situation: Mother calling in for patient describing an episode of dizziness     Background:     Assessment: Mother stated Patient was getting fitted for a wheelchair yesterday and when they stood him up she stated he looked dizzy she thought he was going to faint and he got very sweaty. Patient does have cerebral palsy mother stated     Protocol Recommended Disposition:   No disposition on file.    Recommendation: Please advise if patient needs to be evaluated. No openings in clinic today, Mother stated patient will not go to hospital      Routed to provider    Does the patient meet one of the following criteria for ADS visit consideration? No    Caitlyn Gonzalez RN

## 2024-07-25 NOTE — TELEPHONE ENCOUNTER
Writer contacted Ivette (mother) and relayed provider message below~  Ivette expressed understanding.    Reference message:

## 2024-08-09 ENCOUNTER — TELEPHONE (OUTPATIENT)
Dept: FAMILY MEDICINE | Facility: CLINIC | Age: 64
End: 2024-08-09
Payer: MEDICARE

## 2024-08-09 DIAGNOSIS — G80.1 SPASTIC DIPLEGIC CEREBRAL PALSY (H): ICD-10-CM

## 2024-08-09 DIAGNOSIS — G80.9 CEREBRAL PALSY, UNSPECIFIED TYPE (H): Primary | ICD-10-CM

## 2024-08-09 NOTE — TELEPHONE ENCOUNTER
Jill with [facility], called requesting order for PT evaluation/ wheel chair assessment. Once order is placed, this needs to be faxed to [phone].

## 2024-08-16 NOTE — TELEPHONE ENCOUNTER
PT Jill called requesting status of PT order. Triage informed PT order have been placed. Triage faxed order to fax [phone].

## 2024-08-17 DIAGNOSIS — G80.9 CEREBRAL PALSY, UNSPECIFIED TYPE (H): ICD-10-CM

## 2024-08-17 DIAGNOSIS — R25.2 SPASM: ICD-10-CM

## 2024-08-18 RX ORDER — DIAZEPAM 5 MG
5 TABLET ORAL
Qty: 30 TABLET | Refills: 0 | Status: SHIPPED | OUTPATIENT
Start: 2024-08-18

## 2024-08-18 RX ORDER — BACLOFEN 20 MG/1
20 TABLET ORAL
Qty: 120 TABLET | Refills: 0 | Status: SHIPPED | OUTPATIENT
Start: 2024-08-18 | End: 2024-09-02

## 2024-08-31 DIAGNOSIS — G80.9 CEREBRAL PALSY, UNSPECIFIED TYPE (H): ICD-10-CM

## 2024-09-02 RX ORDER — BACLOFEN 20 MG/1
20 TABLET ORAL
Qty: 120 TABLET | Refills: 0 | Status: SHIPPED | OUTPATIENT
Start: 2024-09-02

## 2024-09-27 ENCOUNTER — ALLIED HEALTH/NURSE VISIT (OUTPATIENT)
Dept: FAMILY MEDICINE | Facility: CLINIC | Age: 64
End: 2024-09-27
Payer: MEDICARE

## 2024-09-27 DIAGNOSIS — Z23 HIGH PRIORITY FOR 2019-NCOV VACCINE: ICD-10-CM

## 2024-09-27 DIAGNOSIS — Z23 NEED FOR PROPHYLACTIC VACCINATION AND INOCULATION AGAINST INFLUENZA: Primary | ICD-10-CM

## 2024-09-27 PROCEDURE — 90480 ADMN SARSCOV2 VAC 1/ONLY CMP: CPT

## 2024-09-27 PROCEDURE — 99207 PR NO CHARGE NURSE ONLY: CPT

## 2024-09-27 PROCEDURE — 91320 SARSCV2 VAC 30MCG TRS-SUC IM: CPT

## 2024-09-27 PROCEDURE — G0008 ADMIN INFLUENZA VIRUS VAC: HCPCS

## 2024-09-27 PROCEDURE — 90656 IIV3 VACC NO PRSV 0.5 ML IM: CPT

## 2024-11-01 DIAGNOSIS — R25.2 SPASM: ICD-10-CM

## 2024-11-01 RX ORDER — DIAZEPAM 5 MG/1
5 TABLET ORAL
Qty: 30 TABLET | Refills: 0 | Status: SHIPPED | OUTPATIENT
Start: 2024-11-01

## 2024-12-05 ENCOUNTER — TRANSFERRED RECORDS (OUTPATIENT)
Dept: HEALTH INFORMATION MANAGEMENT | Facility: CLINIC | Age: 64
End: 2024-12-05

## 2024-12-16 ENCOUNTER — DOCUMENTATION ONLY (OUTPATIENT)
Dept: FAMILY MEDICINE | Facility: CLINIC | Age: 64
End: 2024-12-16
Payer: MEDICARE

## 2024-12-17 NOTE — PROGRESS NOTES
Ezekiel, I refilled both these medications for your patient when I was covering, below are recommendation I received from [facility] [Dr Rosa Michelle] in regards to dosing and drug-drug interaction of both medications baclofen and diazepam, and if any adjustment you feel needs to be done, knowing that these are chronic medications that patient has been taking.  Thank you,  Sommer    Message  Received: Today  Rosa Michelle PharmD Sayegh, Kamil Nadim, MD  I would agree baclofen 20mg 5x/day is exceeding max dose of 80mg/day (20mg four times daily).  It looks like he's been on this long term (going back past 2007).  May be worth further discussion with Dr. Quevedo at follow-up.  Agree diazepam also isn't ideal.  Rosa Michelle PharmD, [facility]  Medication Therapy Management Provider  [phone]          Previous Messages       ----- Message -----  From: Sommer Rose MD  Sent: 12/13/2024   5:32 PM CST  To: Jennifer Marshall I signed a refill order for this patient of Dr Quevedo covering today for prescription of baclofen 20 mg 5 times a day, it states that this exceeding dose of 4 times a day.  Also is on diazepam which is concerning for drug drug interaction.  Any advice appreciated.  Thank you  Sommer

## 2025-02-03 ENCOUNTER — MEDICAL CORRESPONDENCE (OUTPATIENT)
Dept: HEALTH INFORMATION MANAGEMENT | Facility: CLINIC | Age: 65
End: 2025-02-03
Payer: MEDICARE

## 2025-02-07 DIAGNOSIS — R25.2 SPASM: ICD-10-CM

## 2025-02-07 DIAGNOSIS — G80.9 CEREBRAL PALSY, UNSPECIFIED TYPE (H): ICD-10-CM

## 2025-02-10 RX ORDER — DIAZEPAM 5 MG/1
5 TABLET ORAL
Qty: 30 TABLET | Refills: 0 | Status: SHIPPED | OUTPATIENT
Start: 2025-02-10

## 2025-02-10 RX ORDER — BACLOFEN 20 MG/1
20 TABLET ORAL
Qty: 120 TABLET | Refills: 0 | Status: SHIPPED | OUTPATIENT
Start: 2025-02-10

## 2025-02-11 ENCOUNTER — TELEPHONE (OUTPATIENT)
Dept: FAMILY MEDICINE | Facility: CLINIC | Age: 65
End: 2025-02-11
Payer: MEDICARE

## 2025-02-11 NOTE — TELEPHONE ENCOUNTER
"Prolia Project    Approximate due date: 10/2024    Referral not  and CAM ready: Yes and Yes  -- If no, route to provider for orders and schedule at least 2 weeks out.    Referral rechecked by PA team if ordered in : Y/N    Scheduled:       \"I am calling in an attempt to assist patients who are prescribed Prolia in scheduling their 6 month appointment. From my chart review it seems like you are due soon. My records show your past administration of this medication with us was 2024, does this sound correct to you? Can I assist with getting you on the nurse schedule for your next dose of medication?\"    "

## 2025-02-19 NOTE — TELEPHONE ENCOUNTER
Spoke to pt's mother and scheduled. No C2C on file, but gained verbal permission from pt.     Scheduled at the same time as they will come together.     Dina Ken RN

## 2025-02-24 ENCOUNTER — TELEPHONE (OUTPATIENT)
Dept: FAMILY MEDICINE | Facility: CLINIC | Age: 65
End: 2025-02-24

## 2025-02-24 ENCOUNTER — ALLIED HEALTH/NURSE VISIT (OUTPATIENT)
Dept: NURSING | Facility: CLINIC | Age: 65
End: 2025-02-24
Payer: MEDICARE

## 2025-02-24 DIAGNOSIS — M81.0 AGE-RELATED OSTEOPOROSIS WITHOUT CURRENT PATHOLOGICAL FRACTURE: Primary | ICD-10-CM

## 2025-02-24 DIAGNOSIS — Z92.29 PERSONAL HISTORY OF DRUG THERAPY: ICD-10-CM

## 2025-02-24 DIAGNOSIS — M81.0 OSTEOPOROSIS, UNSPECIFIED OSTEOPOROSIS TYPE, UNSPECIFIED PATHOLOGICAL FRACTURE PRESENCE: Primary | ICD-10-CM

## 2025-02-24 PROCEDURE — 99207 PR NO CHARGE NURSE ONLY: CPT

## 2025-02-24 NOTE — TELEPHONE ENCOUNTER
I hope the patient did not come in and was told we could not do it.  If that is the case please let me know.    Thank you    Ezekiel Quevedo M.D.

## 2025-02-24 NOTE — TELEPHONE ENCOUNTER
PCP, please advice on lab order approval. Order pended.    Please r route message to triage for follow up. Thank you.

## 2025-02-24 NOTE — PROGRESS NOTES
Clinic Administered Medication Documentation      Prolia Documentation    Indication: Prolia  (denosumab) is a prescription medicine used to treat osteoporosis in patients who:   Are at high risk for fracture, meaning patients who have had a fracture related to osteoporosis, or who have multiple risk factors for fracture.  Cannot use another osteoporosis medicine or other osteoporosis medicines did not work well.  The timeline for early/late injections would be 4 weeks early and any time after the 6 month deboar. If a patient receives their injection late, then the subsequent injection would be 6 months from the date that they actually received the injection.    When was the last injection?  2024  Was the last injection at least 6 months ago? Yes  Has the prior authorization been completed?  Yes  Is there an active order (written within the past 365 days, with administrations remaining, not ) in the chart?  Yes   GFR Estimate   Date Value Ref Range Status   2024 >90 >60 mL/min/1.73m2 Final   10/04/2019 >90 >60 mL/min/[1.73_m2] Final     Comment:     Non  GFR Calc  Starting 2018, serum creatinine based estimated GFR (eGFR) will be   calculated using the Chronic Kidney Disease Epidemiology Collaboration   (CKD-EPI) equation.       Has patient had a GFR within the last 12 months? Yes   Is GFR under 30, or patient has a diagnosis of CKD4 or CKD5? Yes   Calcium   Date Value Ref Range Status   2024 9.8 8.8 - 10.2 mg/dL Final   10/04/2019 9.1 8.5 - 10.1 mg/dL Final     Is the calcium results 8.8 or above? Yes   Was the calcium done after last Prolia injection? No   Is there an order for calcium in Open Orders? No, there is no order for Calcium. Do not give Prolia today. Send Pt Call encounter with high priority to clinic Triage RN pool. Pt can choose to wait or can RTC for lab once order obtained. Schedule Prolia injection for within 2-14 days.    Fe MISTRY,  Triage RN  [facility]  [facility]

## 2025-02-24 NOTE — TELEPHONE ENCOUNTER
Pt was called to reschedule lab and Prolia injection. Mother states they had to pay $500 ride to clinic for prolia injection since they can't use [employer] . Mother asked if she can have both lab and injection the same day.     Triage huddled with  to find out if lab results are available the same day of lab draw. Tech states it takes 24 hrs to get results back. Pt would need to have 2 separate appts for lab and Prolia. Pt plans to find transportation and call clinic back.     Doctor Quevedo do you give approval to give injection and have lab draw the same day to avoid two separate appts?

## 2025-02-24 NOTE — TELEPHONE ENCOUNTER
Prolia was scheduled for today, due to protocol, unable to give injection due to no open order for calcium lab.    Per protocol, routing to calls as high priority to get lab ordered and patient rescheduled within 2-14 days.    Fe MISTRY,  Triage RN  [facility]

## 2025-02-25 NOTE — TELEPHONE ENCOUNTER
No, please run labs as stat to get it done and injection shortly after, all in one trip    Ezekiel Quevedo M.D.

## 2025-03-08 DIAGNOSIS — R25.2 SPASM: ICD-10-CM

## 2025-03-10 RX ORDER — DIAZEPAM 5 MG/1
5 TABLET ORAL
Qty: 30 TABLET | Refills: 0 | Status: SHIPPED | OUTPATIENT
Start: 2025-03-10

## 2025-03-19 DIAGNOSIS — G80.9 CEREBRAL PALSY, UNSPECIFIED TYPE (H): ICD-10-CM

## 2025-03-19 RX ORDER — BACLOFEN 20 MG/1
20 TABLET ORAL
Qty: 150 TABLET | Refills: 0 | Status: SHIPPED | OUTPATIENT
Start: 2025-03-19

## 2025-03-19 NOTE — TELEPHONE ENCOUNTER
Pharmacy calling requesting that a 30 day supply be sent in instead of a 24 day supply.     Radha Zarco RN

## 2025-04-15 ENCOUNTER — TELEPHONE (OUTPATIENT)
Dept: FAMILY MEDICINE | Facility: CLINIC | Age: 65
End: 2025-04-15
Payer: MEDICARE

## 2025-04-15 DIAGNOSIS — Z92.29 PERSONAL HISTORY OF OTHER DRUG THERAPY: ICD-10-CM

## 2025-04-15 DIAGNOSIS — M81.0 AGE-RELATED OSTEOPOROSIS WITHOUT CURRENT PATHOLOGICAL FRACTURE: Primary | ICD-10-CM

## 2025-04-15 NOTE — TELEPHONE ENCOUNTER
Please make sure patient knows it is important when he does it every 6 months.  If he delays that it can cause bone loss.    Ezekiel Quevedo M.D.

## 2025-04-15 NOTE — TELEPHONE ENCOUNTER
To Dr. Quevedo,  Per [employer] message below, pts prolia order  2025. Pended new prolia order if appropriate. If ordered, please route back to the team to assist in scheduling and obtaining authorization.     Cande Kumar, RN     Karlie Orellana [facility]  [facility],    The Prolia order for this patient expires on 2025.  May I have your team place a new order?    Thank you,    Karlie Anne

## 2025-04-17 NOTE — TELEPHONE ENCOUNTER
Per Prolia referral review, PA status is pending.   Routing message to  to review and RN pool to follow up with pt once PA is approved.  Triage please ensure pt is informed of PCP's message below.

## 2025-05-01 ENCOUNTER — PATIENT OUTREACH (OUTPATIENT)
Dept: CARE COORDINATION | Facility: CLINIC | Age: 65
End: 2025-05-01
Payer: MEDICARE

## 2025-05-01 ASSESSMENT — ACTIVITIES OF DAILY LIVING (ADL): DEPENDENT_IADLS:: TRANSPORTATION;CLEANING;COOKING;LAUNDRY;SHOPPING;MEAL PREPARATION

## 2025-05-01 NOTE — LETTER
[facility] CARE COORDINATION  [address]  [place] [address]    May 1, 2025    Ambrocio Delvalle  [address]  [place] [address]      Dear Ambrocio,    I am a clinic care coordinator who works with Ezekiel Quevedo MD with the [facility]. I wanted to thank you for spending the time to talk with me.  Below is a description of clinic care coordination and how I can further assist you.       The clinic care coordination team is made up of a registered nurse, , financial resource worker and community health worker who understand the health care system. The goal of clinic care coordination is to help you manage your health and improve access to the health care system. Our team works alongside your provider to assist you in determining your health and social needs. We can help you obtain health care and community resources, providing you with necessary information and education. We can work with you through any barriers and develop a care plan that helps coordinate and strengthen the communication between you and your care team.  Our services are voluntary and are offered without charge to you personally.    Please feel free to contact me with any questions or concerns regarding care coordination and what we can offer.      We are focused on providing you with the highest-quality healthcare experience possible.    Sincerely,     Neelima Bourgeois,  [facility]  Clinic Care Coordinator  [facility]  [phone]  [email or web address]
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
## 2025-05-01 NOTE — LETTER
[facility]  Patient Centered Plan of Care  About Me:        Patient Name:  Ambrocio Werner    YOB: 1960  Age:         64 year old   Ariel MRN:    [id number] Telephone Information:  Home Phone [phone]   Mobile [phone]       Address:  [address]  [facility] [address] Email address:  [email or web address]      Emergency Contact(s)    Name Relationship Lgl Grd Work Phone Home Phone Mobile Phone   1. LEANNA WERNER Mother   [phone]    2. DIMA-GREAT * Other   [phone]           Primary language:  English     needed? No   [place] [employer]:  [phone] op. 1  Other communication barriers:No data recorded  Preferred Method of Communication:  Mail  Current living arrangement: I live in a private home with family    Mobility Status/ Medical Equipment: No data recorded      Health Maintenance  Health Maintenance Reviewed: Due/Overdue   Health Maintenance Due   Topic Date Due    HIV SCREENING  Never done    Pneumococcal Vaccine: 50+ Years (2 of 2 - PCV) 08/01/2011    COLORECTAL CANCER SCREENING  11/29/2019    RSV VACCINE (1 - Risk 60-74 years 1-dose series) Never done    PHQ-9  10/08/2024    BMP  04/08/2025    LIPID  04/08/2025    MEDICARE ANNUAL WELLNESS VISIT  04/08/2025           My Access Plan  Medical Emergency 911   Primary Clinic Line [facility] [phone]   24 Hour Appointment Line [phone] or  [phone] ([phone]) (toll-free)   24 Hour Nurse Line [phone] (toll-free)   Preferred Urgent Care [facility], [phone]     Preferred Hospital No data recorded   Preferred Pharmacy [facility] - [place] - [address]     Behavioral Health Crisis Line The National Suicide Prevention Lifeline at 1-746.875.6651 or Text/Call 256           My Care Team Members  Patient Care Team         Relationship Specialty Notifications Start End    Ezekiel Quevedo MD PCP  - General Internal Medicine  12/14/11     Phone: [phone] Fax: [phone] [address] [place] [address]    Ezekiel Quevedo MD Assigned PCP   4/12/13     Phone: [phone] Fax: [phone] [address] [place] [address]    Fiona Galloway MD MD Pulmonary Disease  9/10/21     Phone: [phone] Fax: [phone]         [address] [facility] [address] [place] [address]    Neelima Bourgeois [facility] Lead Care Coordinator  Admissions 5/1/25     Phone: [phone]                     My Care Plans  Self Management and Treatment Plan    Care Plan  Care Plan: Community Resources       Problem: Multiple SDOH issues       Note:     Goal Statement: José Miguel will continue working with Care Coordination to ensure his   needs are met for her overall health and wellbeing.  Date Goal Set: 5/1/25  Barriers: Complex financial and insurance issues  Strengths: Strong support system  Date to Achieve By: 5/1/26  Patient expressed understanding of goal: yes  Action steps to achieve this goal:  1. I will continue to follow up with medical team as needed  2. I will work with SW to obtain MA and other related issues  3. I will outreach to Care Coordination  for further questions or concerns          Goal: Establish adequate home support                             Action Plans on File:                       Advance Care Plans/Directives:   Advanced Care Plan/Directives on file: No data recorded  Discussed with patient/caregiver(s): No data recorded             My Medical and Care Information  Problem List   Patient Active Problem List   Diagnosis    Hyperlipidemia LDL goal <130    Hypertension    Vitamin D deficiency    Reflex sympathetic dystrophy of lower extremity    Osteoporosis    Excessive sweating    Recurrent major depressive disorder, in partial remission    Complex regional pain syndrome type 1 of both lower extremities    Low HDL (under 40)    Chronic pain    Chronic  cough    Cerebral palsy, unspecified type (H)      Current Medications:  Please refer to the most recent medication list provided to you by your medical team and reach out to your provider with any questions or to make any corrections.    Care Coordination Start Date: 5/1/2025   Frequency of Care Coordination: monthly, more frequently as needed     Form Last Updated: 05/01/2025

## 2025-05-07 ENCOUNTER — PATIENT OUTREACH (OUTPATIENT)
Dept: CARE COORDINATION | Facility: CLINIC | Age: 65
End: 2025-05-07
Payer: MEDICARE
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
## 2025-05-10 DIAGNOSIS — G80.9 CEREBRAL PALSY, UNSPECIFIED TYPE (H): ICD-10-CM

## 2025-05-12 RX ORDER — BACLOFEN 20 MG/1
20 TABLET ORAL
Qty: 150 TABLET | Refills: 1 | Status: SHIPPED | OUTPATIENT
Start: 2025-05-12

## 2025-05-24 ENCOUNTER — HEALTH MAINTENANCE LETTER (OUTPATIENT)
Age: 65
End: 2025-05-24

## 2025-06-02 ENCOUNTER — PATIENT OUTREACH (OUTPATIENT)
Dept: CARE COORDINATION | Facility: CLINIC | Age: 65
End: 2025-06-02
Payer: MEDICARE

## 2025-06-02 NOTE — PROGRESS NOTES
"Clinic Care Coordination Contact  Care Team Conversations    SW received voicemail from pt and his mother.  She shares that they received the letter SW sent outlining the documentation will require in order to try and override his MA denial.  Mother shares that the county should not need all of that documentation, and she will be \"talking to a \".    SW called and left voicemail.        Neelima Bourgeois,  [facility]  Clinic Care Coordinator  [facility]  [phone]  [email or web address]    "

## 2025-07-14 ENCOUNTER — PATIENT OUTREACH (OUTPATIENT)
Dept: CARE COORDINATION | Facility: CLINIC | Age: 65
End: 2025-07-14
Payer: MEDICARE
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
## 2025-07-17 ENCOUNTER — PATIENT OUTREACH (OUTPATIENT)
Dept: CARE COORDINATION | Facility: CLINIC | Age: 65
End: 2025-07-17
Payer: MEDICARE

## 2025-07-17 NOTE — PROGRESS NOTES
Clinic Care Coordination Contact  Care Team Conversations    SW reviewed chart.  SW had been working both with pt and mother, and mother was closed by coverage SW during time when SW was out of office.  Son should have also been closed at that time, but was not and when coverage SW reached out to pt/mother mother indicated no desire to continue to to work [facility].  Therefore, SW will now close pt as well per family request.       Neelima Bourgeois,  [facility]  Clinic Care Coordinator  [facility]  [phone]  [email or web address]

## 2025-07-26 ENCOUNTER — HEALTH MAINTENANCE LETTER (OUTPATIENT)
Age: 65
End: 2025-07-26

## 2025-08-05 ENCOUNTER — PATIENT OUTREACH (OUTPATIENT)
Dept: CARE COORDINATION | Facility: CLINIC | Age: 65
End: 2025-08-05
Payer: MEDICARE